# Patient Record
Sex: FEMALE | Race: WHITE | NOT HISPANIC OR LATINO | Employment: FULL TIME | URBAN - METROPOLITAN AREA
[De-identification: names, ages, dates, MRNs, and addresses within clinical notes are randomized per-mention and may not be internally consistent; named-entity substitution may affect disease eponyms.]

---

## 2018-02-13 ENCOUNTER — OFFICE VISIT - HEALTHEAST (OUTPATIENT)
Dept: SURGERY | Facility: CLINIC | Age: 28
End: 2018-02-13

## 2018-02-13 DIAGNOSIS — L05.01 PILONIDAL ABSCESS OF NATAL CLEFT: ICD-10-CM

## 2018-02-13 ASSESSMENT — MIFFLIN-ST. JEOR: SCORE: 1371.68

## 2018-02-16 ENCOUNTER — AMBULATORY - HEALTHEAST (OUTPATIENT)
Dept: SURGERY | Facility: CLINIC | Age: 28
End: 2018-02-16

## 2018-03-01 ENCOUNTER — OFFICE VISIT - HEALTHEAST (OUTPATIENT)
Dept: FAMILY MEDICINE | Facility: CLINIC | Age: 28
End: 2018-03-01

## 2018-03-01 DIAGNOSIS — N76.0 BACTERIAL VAGINOSIS: ICD-10-CM

## 2018-03-01 DIAGNOSIS — B96.89 BACTERIAL VAGINOSIS: ICD-10-CM

## 2018-03-01 DIAGNOSIS — E10.9 TYPE 1 DIABETES MELLITUS WITHOUT COMPLICATION (H): ICD-10-CM

## 2018-03-01 DIAGNOSIS — L05.91 PILONIDAL CYST: ICD-10-CM

## 2018-03-01 DIAGNOSIS — Z00.00 ROUTINE PHYSICAL EXAMINATION: ICD-10-CM

## 2018-03-01 DIAGNOSIS — N89.8 VAGINAL DISCHARGE: ICD-10-CM

## 2018-03-01 LAB
ANION GAP SERPL CALCULATED.3IONS-SCNC: 11 MMOL/L (ref 5–18)
BUN SERPL-MCNC: 13 MG/DL (ref 8–22)
CALCIUM SERPL-MCNC: 9.5 MG/DL (ref 8.5–10.5)
CHLORIDE BLD-SCNC: 99 MMOL/L (ref 98–107)
CHOLEST SERPL-MCNC: 165 MG/DL
CLUE CELLS: ABNORMAL
CO2 SERPL-SCNC: 28 MMOL/L (ref 22–31)
CREAT SERPL-MCNC: 0.79 MG/DL (ref 0.6–1.1)
CREAT UR-MCNC: 104.9 MG/DL
FASTING STATUS PATIENT QL REPORTED: YES
GFR SERPL CREATININE-BSD FRML MDRD: >60 ML/MIN/1.73M2
GLUCOSE BLD-MCNC: 190 MG/DL (ref 70–125)
HBA1C MFR BLD: 8.4 % (ref 3.5–6)
HDLC SERPL-MCNC: 64 MG/DL
LDLC SERPL CALC-MCNC: 89 MG/DL
MICROALBUMIN UR-MCNC: 1.54 MG/DL (ref 0–1.99)
MICROALBUMIN/CREAT UR: 14.7 MG/G
POTASSIUM BLD-SCNC: 3.8 MMOL/L (ref 3.5–5)
SODIUM SERPL-SCNC: 138 MMOL/L (ref 136–145)
TRICHOMONAS, WET PREP: ABNORMAL
TRIGL SERPL-MCNC: 58 MG/DL
YEAST, WET PREP: ABNORMAL

## 2018-03-01 ASSESSMENT — MIFFLIN-ST. JEOR: SCORE: 1385.41

## 2018-03-02 LAB
C TRACH DNA SPEC QL PROBE+SIG AMP: NEGATIVE
N GONORRHOEA DNA SPEC QL NAA+PROBE: NEGATIVE

## 2018-03-05 ENCOUNTER — COMMUNICATION - HEALTHEAST (OUTPATIENT)
Dept: INTERNAL MEDICINE | Facility: CLINIC | Age: 28
End: 2018-03-05

## 2018-03-05 LAB

## 2018-04-10 ENCOUNTER — AMBULATORY - HEALTHEAST (OUTPATIENT)
Dept: ENDOCRINOLOGY | Facility: CLINIC | Age: 28
End: 2018-04-10

## 2018-04-10 ENCOUNTER — OFFICE VISIT - HEALTHEAST (OUTPATIENT)
Dept: ENDOCRINOLOGY | Facility: CLINIC | Age: 28
End: 2018-04-10

## 2018-04-10 DIAGNOSIS — E10.9 TYPE 1 DIABETES MELLITUS WITHOUT COMPLICATION (H): ICD-10-CM

## 2018-04-10 ASSESSMENT — MIFFLIN-ST. JEOR: SCORE: 1415.35

## 2018-04-19 ENCOUNTER — COMMUNICATION - HEALTHEAST (OUTPATIENT)
Dept: ENDOCRINOLOGY | Facility: CLINIC | Age: 28
End: 2018-04-19

## 2018-04-19 DIAGNOSIS — E10.9 TYPE 1 DIABETES MELLITUS WITHOUT COMPLICATION (H): ICD-10-CM

## 2018-05-08 ENCOUNTER — COMMUNICATION - HEALTHEAST (OUTPATIENT)
Dept: ENDOCRINOLOGY | Facility: CLINIC | Age: 28
End: 2018-05-08

## 2018-05-08 DIAGNOSIS — E10.9 TYPE 1 DIABETES MELLITUS WITHOUT COMPLICATION (H): ICD-10-CM

## 2018-07-10 ENCOUNTER — COMMUNICATION - HEALTHEAST (OUTPATIENT)
Dept: TELEHEALTH | Facility: CLINIC | Age: 28
End: 2018-07-10

## 2018-07-10 ENCOUNTER — AMBULATORY - HEALTHEAST (OUTPATIENT)
Dept: LAB | Facility: CLINIC | Age: 28
End: 2018-07-10

## 2018-07-10 DIAGNOSIS — E10.9 TYPE 1 DIABETES MELLITUS WITHOUT COMPLICATION (H): ICD-10-CM

## 2018-07-10 LAB
ANION GAP SERPL CALCULATED.3IONS-SCNC: 8 MMOL/L (ref 5–18)
BUN SERPL-MCNC: 12 MG/DL (ref 8–22)
CALCIUM SERPL-MCNC: 9.6 MG/DL (ref 8.5–10.5)
CHLORIDE BLD-SCNC: 107 MMOL/L (ref 98–107)
CO2 SERPL-SCNC: 24 MMOL/L (ref 22–31)
CREAT SERPL-MCNC: 0.74 MG/DL (ref 0.6–1.1)
GFR SERPL CREATININE-BSD FRML MDRD: >60 ML/MIN/1.73M2
GLUCOSE BLD-MCNC: 152 MG/DL (ref 70–125)
HBA1C MFR BLD: 7.3 % (ref 3.5–6)
POTASSIUM BLD-SCNC: 4.2 MMOL/L (ref 3.5–5)
SODIUM SERPL-SCNC: 139 MMOL/L (ref 136–145)
TSH SERPL DL<=0.005 MIU/L-ACNC: 5.73 UIU/ML (ref 0.3–5)

## 2018-07-17 ENCOUNTER — OFFICE VISIT - HEALTHEAST (OUTPATIENT)
Dept: ENDOCRINOLOGY | Facility: CLINIC | Age: 28
End: 2018-07-17

## 2018-07-17 DIAGNOSIS — E10.9 TYPE 1 DIABETES MELLITUS WITHOUT COMPLICATION (H): ICD-10-CM

## 2018-07-17 RX ORDER — GLUCOSAMINE HCL/CHONDROITIN SU 500-400 MG
CAPSULE ORAL
Qty: 200 STRIP | Refills: 5 | Status: SHIPPED | OUTPATIENT
Start: 2018-07-17

## 2018-07-17 ASSESSMENT — MIFFLIN-ST. JEOR: SCORE: 1435.31

## 2018-08-09 ENCOUNTER — OFFICE VISIT - HEALTHEAST (OUTPATIENT)
Dept: EDUCATION SERVICES | Facility: CLINIC | Age: 28
End: 2018-08-09

## 2018-08-09 DIAGNOSIS — E10.9 TYPE 1 DIABETES MELLITUS WITHOUT COMPLICATION (H): ICD-10-CM

## 2018-08-28 ENCOUNTER — OFFICE VISIT - HEALTHEAST (OUTPATIENT)
Dept: SURGERY | Facility: CLINIC | Age: 28
End: 2018-08-28

## 2018-08-28 DIAGNOSIS — L05.01 PILONIDAL ABSCESS OF NATAL CLEFT: ICD-10-CM

## 2018-08-28 ASSESSMENT — MIFFLIN-ST. JEOR: SCORE: 1428.96

## 2018-08-29 ENCOUNTER — AMBULATORY - HEALTHEAST (OUTPATIENT)
Dept: ENDOCRINOLOGY | Facility: CLINIC | Age: 28
End: 2018-08-29

## 2018-08-29 DIAGNOSIS — E10.9 TYPE 1 DIABETES MELLITUS WITHOUT COMPLICATION (H): ICD-10-CM

## 2018-09-13 ENCOUNTER — OFFICE VISIT - HEALTHEAST (OUTPATIENT)
Dept: SURGERY | Facility: CLINIC | Age: 28
End: 2018-09-13

## 2018-09-13 DIAGNOSIS — L05.91 PILONIDAL CYST: ICD-10-CM

## 2018-09-13 ASSESSMENT — MIFFLIN-ST. JEOR: SCORE: 1410.81

## 2018-10-02 ENCOUNTER — COMMUNICATION - HEALTHEAST (OUTPATIENT)
Dept: ADMINISTRATIVE | Facility: CLINIC | Age: 28
End: 2018-10-02

## 2018-10-02 DIAGNOSIS — E10.9 TYPE 1 DIABETES MELLITUS WITHOUT COMPLICATION (H): ICD-10-CM

## 2018-10-03 ENCOUNTER — COMMUNICATION - HEALTHEAST (OUTPATIENT)
Dept: ENDOCRINOLOGY | Facility: CLINIC | Age: 28
End: 2018-10-03

## 2018-10-03 DIAGNOSIS — E10.9 TYPE 1 DIABETES MELLITUS WITHOUT COMPLICATION (H): ICD-10-CM

## 2018-10-11 ENCOUNTER — OFFICE VISIT - HEALTHEAST (OUTPATIENT)
Dept: SURGERY | Facility: CLINIC | Age: 28
End: 2018-10-11

## 2018-10-11 DIAGNOSIS — L05.91 PILONIDAL CYST: ICD-10-CM

## 2018-11-01 ENCOUNTER — OFFICE VISIT - HEALTHEAST (OUTPATIENT)
Dept: SURGERY | Facility: CLINIC | Age: 28
End: 2018-11-01

## 2018-11-01 DIAGNOSIS — L05.91 PILONIDAL CYST: ICD-10-CM

## 2018-11-01 ASSESSMENT — MIFFLIN-ST. JEOR: SCORE: 1410.81

## 2018-11-06 ENCOUNTER — COMMUNICATION - HEALTHEAST (OUTPATIENT)
Dept: FAMILY MEDICINE | Facility: CLINIC | Age: 28
End: 2018-11-06

## 2018-11-13 ENCOUNTER — OFFICE VISIT - HEALTHEAST (OUTPATIENT)
Dept: FAMILY MEDICINE | Facility: CLINIC | Age: 28
End: 2018-11-13

## 2018-11-13 DIAGNOSIS — L05.91 PILONIDAL CYST: ICD-10-CM

## 2018-11-13 DIAGNOSIS — E10.9 TYPE 1 DIABETES MELLITUS WITHOUT COMPLICATION (H): ICD-10-CM

## 2018-11-13 DIAGNOSIS — Z01.818 PRE-OP EXAM: ICD-10-CM

## 2018-11-13 LAB
ANION GAP SERPL CALCULATED.3IONS-SCNC: 9 MMOL/L (ref 5–18)
BUN SERPL-MCNC: 14 MG/DL (ref 8–22)
CALCIUM SERPL-MCNC: 9.7 MG/DL (ref 8.5–10.5)
CHLORIDE BLD-SCNC: 104 MMOL/L (ref 98–107)
CO2 SERPL-SCNC: 26 MMOL/L (ref 22–31)
CREAT SERPL-MCNC: 0.76 MG/DL (ref 0.6–1.1)
ERYTHROCYTE [DISTWIDTH] IN BLOOD BY AUTOMATED COUNT: 11.2 % (ref 11–14.5)
GFR SERPL CREATININE-BSD FRML MDRD: >60 ML/MIN/1.73M2
GLUCOSE BLD-MCNC: 105 MG/DL (ref 70–125)
HBA1C MFR BLD: 7.8 % (ref 3.5–6)
HCT VFR BLD AUTO: 41.8 % (ref 35–47)
HGB BLD-MCNC: 14.3 G/DL (ref 12–16)
MCH RBC QN AUTO: 29.1 PG (ref 27–34)
MCHC RBC AUTO-ENTMCNC: 34.1 G/DL (ref 32–36)
MCV RBC AUTO: 85 FL (ref 80–100)
PLATELET # BLD AUTO: 270 THOU/UL (ref 140–440)
PMV BLD AUTO: 8.6 FL (ref 7–10)
POTASSIUM BLD-SCNC: 4.1 MMOL/L (ref 3.5–5)
RBC # BLD AUTO: 4.9 MILL/UL (ref 3.8–5.4)
SODIUM SERPL-SCNC: 139 MMOL/L (ref 136–145)
WBC: 4.6 THOU/UL (ref 4–11)

## 2018-11-13 ASSESSMENT — MIFFLIN-ST. JEOR: SCORE: 1421.02

## 2018-11-16 ASSESSMENT — MIFFLIN-ST. JEOR: SCORE: 1419.88

## 2018-11-19 ENCOUNTER — ANESTHESIA - HEALTHEAST (OUTPATIENT)
Dept: SURGERY | Facility: AMBULATORY SURGERY CENTER | Age: 28
End: 2018-11-19

## 2018-11-20 ENCOUNTER — SURGERY - HEALTHEAST (OUTPATIENT)
Dept: SURGERY | Facility: AMBULATORY SURGERY CENTER | Age: 28
End: 2018-11-20

## 2018-11-20 ASSESSMENT — MIFFLIN-ST. JEOR: SCORE: 1419.88

## 2018-11-29 ENCOUNTER — OFFICE VISIT - HEALTHEAST (OUTPATIENT)
Dept: SURGERY | Facility: CLINIC | Age: 28
End: 2018-11-29

## 2018-11-29 DIAGNOSIS — Z48.89 POSTOPERATIVE VISIT: ICD-10-CM

## 2018-11-29 ASSESSMENT — MIFFLIN-ST. JEOR: SCORE: 1419.88

## 2018-12-18 ENCOUNTER — OFFICE VISIT - HEALTHEAST (OUTPATIENT)
Dept: SURGERY | Facility: CLINIC | Age: 28
End: 2018-12-18

## 2018-12-18 DIAGNOSIS — Z48.89 POSTOPERATIVE VISIT: ICD-10-CM

## 2018-12-18 ASSESSMENT — MIFFLIN-ST. JEOR: SCORE: 1419.88

## 2019-02-06 ENCOUNTER — COMMUNICATION - HEALTHEAST (OUTPATIENT)
Dept: FAMILY MEDICINE | Facility: CLINIC | Age: 29
End: 2019-02-06

## 2019-02-06 DIAGNOSIS — N89.8 VAGINAL DISCHARGE: ICD-10-CM

## 2019-02-08 ENCOUNTER — COMMUNICATION - HEALTHEAST (OUTPATIENT)
Dept: TELEHEALTH | Facility: CLINIC | Age: 29
End: 2019-02-08

## 2019-02-08 DIAGNOSIS — E10.9 TYPE 1 DIABETES MELLITUS WITHOUT COMPLICATION (H): ICD-10-CM

## 2019-02-20 ENCOUNTER — COMMUNICATION - HEALTHEAST (OUTPATIENT)
Dept: ENDOCRINOLOGY | Facility: CLINIC | Age: 29
End: 2019-02-20

## 2019-02-20 DIAGNOSIS — E10.9 TYPE 1 DIABETES MELLITUS WITHOUT COMPLICATION (H): ICD-10-CM

## 2019-02-27 ENCOUNTER — OFFICE VISIT - HEALTHEAST (OUTPATIENT)
Dept: FAMILY MEDICINE | Facility: CLINIC | Age: 29
End: 2019-02-27

## 2019-02-27 DIAGNOSIS — Z30.9 ENCOUNTER FOR CONTRACEPTIVE MANAGEMENT, UNSPECIFIED TYPE: ICD-10-CM

## 2019-02-27 RX ORDER — NORETHINDRONE ACETATE AND ETHINYL ESTRADIOL .02; 1 MG/1; MG/1
1 TABLET ORAL DAILY
Qty: 3 PACKAGE | Refills: 4 | Status: SHIPPED | OUTPATIENT
Start: 2019-02-27 | End: 2023-10-07

## 2019-02-27 ASSESSMENT — MIFFLIN-ST. JEOR: SCORE: 1416.25

## 2019-03-09 ENCOUNTER — COMMUNICATION - HEALTHEAST (OUTPATIENT)
Dept: ENDOCRINOLOGY | Facility: CLINIC | Age: 29
End: 2019-03-09

## 2019-03-14 ENCOUNTER — OFFICE VISIT - HEALTHEAST (OUTPATIENT)
Dept: FAMILY MEDICINE | Facility: CLINIC | Age: 29
End: 2019-03-14

## 2019-03-14 ENCOUNTER — COMMUNICATION - HEALTHEAST (OUTPATIENT)
Dept: NURSING | Facility: CLINIC | Age: 29
End: 2019-03-14

## 2019-03-14 DIAGNOSIS — E10.9 TYPE 1 DIABETES MELLITUS WITHOUT COMPLICATION (H): ICD-10-CM

## 2019-03-14 DIAGNOSIS — Z00.00 ROUTINE HISTORY AND PHYSICAL EXAMINATION OF ADULT: ICD-10-CM

## 2019-03-14 LAB
ANION GAP SERPL CALCULATED.3IONS-SCNC: 8 MMOL/L (ref 5–18)
BUN SERPL-MCNC: 16 MG/DL (ref 8–22)
CALCIUM SERPL-MCNC: 9.5 MG/DL (ref 8.5–10.5)
CHLORIDE BLD-SCNC: 101 MMOL/L (ref 98–107)
CHOLEST SERPL-MCNC: 184 MG/DL
CO2 SERPL-SCNC: 28 MMOL/L (ref 22–31)
CREAT SERPL-MCNC: 0.97 MG/DL (ref 0.6–1.1)
CREAT UR-MCNC: 194.2 MG/DL
FASTING STATUS PATIENT QL REPORTED: NO
GFR SERPL CREATININE-BSD FRML MDRD: >60 ML/MIN/1.73M2
GLUCOSE BLD-MCNC: 279 MG/DL (ref 70–125)
HBA1C MFR BLD: 7.6 % (ref 3.5–6)
HDLC SERPL-MCNC: 68 MG/DL
LDLC SERPL CALC-MCNC: 95 MG/DL
MICROALBUMIN UR-MCNC: 2.23 MG/DL (ref 0–1.99)
MICROALBUMIN/CREAT UR: 11.5 MG/G
POTASSIUM BLD-SCNC: 3.8 MMOL/L (ref 3.5–5)
SODIUM SERPL-SCNC: 137 MMOL/L (ref 136–145)
TRIGL SERPL-MCNC: 103 MG/DL

## 2019-03-14 ASSESSMENT — MIFFLIN-ST. JEOR: SCORE: 1409.9

## 2019-03-15 ENCOUNTER — COMMUNICATION - HEALTHEAST (OUTPATIENT)
Dept: FAMILY MEDICINE | Facility: CLINIC | Age: 29
End: 2019-03-15

## 2019-05-04 ENCOUNTER — COMMUNICATION - HEALTHEAST (OUTPATIENT)
Dept: FAMILY MEDICINE | Facility: CLINIC | Age: 29
End: 2019-05-04

## 2019-05-04 DIAGNOSIS — E10.9 TYPE 1 DIABETES MELLITUS WITHOUT COMPLICATION (H): ICD-10-CM

## 2019-05-05 ENCOUNTER — COMMUNICATION - HEALTHEAST (OUTPATIENT)
Dept: FAMILY MEDICINE | Facility: CLINIC | Age: 29
End: 2019-05-05

## 2019-05-05 DIAGNOSIS — E10.9 TYPE 1 DIABETES MELLITUS WITHOUT COMPLICATION (H): ICD-10-CM

## 2019-08-21 ENCOUNTER — COMMUNICATION - HEALTHEAST (OUTPATIENT)
Dept: FAMILY MEDICINE | Facility: CLINIC | Age: 29
End: 2019-08-21

## 2019-08-21 DIAGNOSIS — E10.9 TYPE 1 DIABETES MELLITUS WITHOUT COMPLICATION (H): ICD-10-CM

## 2019-09-19 ENCOUNTER — COMMUNICATION - HEALTHEAST (OUTPATIENT)
Dept: FAMILY MEDICINE | Facility: CLINIC | Age: 29
End: 2019-09-19

## 2019-10-21 ENCOUNTER — COMMUNICATION - HEALTHEAST (OUTPATIENT)
Dept: FAMILY MEDICINE | Facility: CLINIC | Age: 29
End: 2019-10-21

## 2020-05-15 ENCOUNTER — COMMUNICATION - HEALTHEAST (OUTPATIENT)
Dept: FAMILY MEDICINE | Facility: CLINIC | Age: 30
End: 2020-05-15

## 2020-05-15 DIAGNOSIS — Z30.9 ENCOUNTER FOR CONTRACEPTIVE MANAGEMENT, UNSPECIFIED TYPE: ICD-10-CM

## 2020-05-28 ENCOUNTER — COMMUNICATION - HEALTHEAST (OUTPATIENT)
Dept: FAMILY MEDICINE | Facility: CLINIC | Age: 30
End: 2020-05-28

## 2020-05-28 DIAGNOSIS — E10.9 TYPE 1 DIABETES MELLITUS WITHOUT COMPLICATION (H): ICD-10-CM

## 2020-06-01 ENCOUNTER — COMMUNICATION - HEALTHEAST (OUTPATIENT)
Dept: FAMILY MEDICINE | Facility: CLINIC | Age: 30
End: 2020-06-01

## 2020-06-01 DIAGNOSIS — E10.9 TYPE 1 DIABETES MELLITUS WITHOUT COMPLICATION (H): ICD-10-CM

## 2021-05-28 NOTE — TELEPHONE ENCOUNTER
Refill Approved    Rx renewed per Medication Renewal Policy. Medication was last renewed on 3/14/19.    Ov: 3/14/19    Maty Duran, [facility] Triage/Med Refill 5/5/2019     Requested Prescriptions   Pending Prescriptions Disp Refills     insulin lispro (ADMELOG SOLOSTAR U-100 INSULIN) 100 unit/mL pen 60 mL 0     Sig: Use up to 65 units daily in insulin pump; please dispense vials, not pens       Insulin/GLP-1 Refill Protocol Passed - 5/4/2019 12:45 PM        Passed - Visit with PCP or prescribing provider visit in last 6 months     Last office visit with prescriber/PCP: Visit date not found OR same dept: Visit date not found OR same specialty: Visit date not found Last physical: 3/14/2019 Last MTM visit: Visit date not found     Next appt within 3 mo: Visit date not found  Next physical within 3 mo: Visit date not found  Prescriber OR PCP: Pawel Blanchard MD  Last diagnosis associated with med order: 1. Type 1 diabetes mellitus without complication (H)  - insulin lispro (ADMELOG SOLOSTAR U-100 INSULIN) 100 unit/mL pen; Use up to 65 units daily in insulin pump; please dispense vials, not pens  Dispense: 60 mL; Refill: 0    If protocol passes may refill for 6 months if within 3 months of last provider visit (or a total of 9 months).              Passed - A1C in last 6 months     Hemoglobin A1c   Date Value Ref Range Status   03/14/2019 7.6 (H) 3.5 - 6.0 % Final               Passed - Microalbumin in last year     Microalbumin, Random Urine   Date Value Ref Range Status   03/14/2019 2.23 (H) 0.00 - 1.99 mg/dL Final                  Passed - Blood pressure in last year     BP Readings from Last 1 Encounters:   03/14/19 116/78             Passed - Creatinine done in last year     Creatinine   Date Value Ref Range Status   03/14/2019 0.97 0.60 - 1.10 mg/dL Final

## 2021-05-28 NOTE — TELEPHONE ENCOUNTER
Refill Approved    Rx renewed per Medication Renewal Policy. Medication was last renewed on 5/5/19.    Keiko Johnson, [facility] Connection Triage/Med Refill 5/5/2019     Requested Prescriptions   Pending Prescriptions Disp Refills     insulin lispro (ADMELOG SOLOSTAR U-100 INSULIN) 100 unit/mL pen 60 mL 0     Sig: Use up to 65 units daily in insulin pump; please dispense vials, not pens       Insulin/GLP-1 Refill Protocol Passed - 5/5/2019 11:39 AM        Passed - Visit with PCP or prescribing provider visit in last 6 months     Last office visit with prescriber/PCP: Visit date not found OR same dept: Visit date not found OR same specialty: Visit date not found Last physical: 3/14/2019 Last MTM visit: Visit date not found     Next appt within 3 mo: Visit date not found  Next physical within 3 mo: Visit date not found  Prescriber OR PCP: Pawel Blanchard MD  Last diagnosis associated with med order: 1. Type 1 diabetes mellitus without complication (H)  - insulin lispro (ADMELOG SOLOSTAR U-100 INSULIN) 100 unit/mL pen; Use up to 65 units daily in insulin pump; please dispense vials, not pens  Dispense: 60 mL; Refill: 0    If protocol passes may refill for 6 months if within 3 months of last provider visit (or a total of 9 months).              Passed - A1C in last 6 months     Hemoglobin A1c   Date Value Ref Range Status   03/14/2019 7.6 (H) 3.5 - 6.0 % Final               Passed - Microalbumin in last year     Microalbumin, Random Urine   Date Value Ref Range Status   03/14/2019 2.23 (H) 0.00 - 1.99 mg/dL Final                  Passed - Blood pressure in last year     BP Readings from Last 1 Encounters:   03/14/19 116/78             Passed - Creatinine done in last year     Creatinine   Date Value Ref Range Status   03/14/2019 0.97 0.60 - 1.10 mg/dL Final

## 2021-05-31 NOTE — TELEPHONE ENCOUNTER
Refill Approved    Rx renewed per Medication Renewal Policy. Medication was last renewed on 5/5/19 #60mL R-0.    Last OV 3/14/19    Ivana Kowalski, [facility] Connection Triage/Med Refill 8/22/2019     Requested Prescriptions   Pending Prescriptions Disp Refills     insulin lispro (ADMELOG SOLOSTAR U-100 INSULIN) 100 unit/mL pen 60 mL 0     Sig: Use up to 65 units daily in insulin pump; please dispense vials, not pens       Insulin/GLP-1 Refill Protocol Passed - 8/21/2019 10:08 AM        Passed - Visit with PCP or prescribing provider visit in last 6 months     Last office visit with prescriber/PCP: Visit date not found OR same dept: Visit date not found OR same specialty: Visit date not found Last physical: 3/14/2019 Last MTM visit: Visit date not found     Next appt within 3 mo: Visit date not found  Next physical within 3 mo: Visit date not found  Prescriber OR PCP: Pawel Blanchard MD  Last diagnosis associated with med order: 1. Type 1 diabetes mellitus without complication (H)  - insulin lispro (ADMELOG SOLOSTAR U-100 INSULIN) 100 unit/mL pen; Use up to 65 units daily in insulin pump; please dispense vials, not pens  Dispense: 60 mL; Refill: 0    If protocol passes may refill for 6 months if within 3 months of last provider visit (or a total of 9 months).              Passed - A1C in last 6 months     Hemoglobin A1c   Date Value Ref Range Status   03/14/2019 7.6 (H) 3.5 - 6.0 % Final               Passed - Microalbumin in last year     Microalbumin, Random Urine   Date Value Ref Range Status   03/14/2019 2.23 (H) 0.00 - 1.99 mg/dL Final                  Passed - Blood pressure in last year     BP Readings from Last 1 Encounters:   03/14/19 116/78             Passed - Creatinine done in last year     Creatinine   Date Value Ref Range Status   03/14/2019 0.97 0.60 - 1.10 mg/dL Final

## 2021-06-01 VITALS — BODY MASS INDEX: 26.06 KG/M2 | HEIGHT: 65 IN | WEIGHT: 156.4 LBS

## 2021-06-01 VITALS — BODY MASS INDEX: 25.33 KG/M2 | HEIGHT: 65 IN | WEIGHT: 152 LBS

## 2021-06-01 VITALS — BODY MASS INDEX: 24.22 KG/M2 | WEIGHT: 145.4 LBS | HEIGHT: 65 IN

## 2021-06-01 VITALS — HEIGHT: 65 IN | WEIGHT: 141.5 LBS | BODY MASS INDEX: 23.57 KG/M2

## 2021-06-02 VITALS — HEIGHT: 65 IN | BODY MASS INDEX: 25.53 KG/M2 | WEIGHT: 153.25 LBS

## 2021-06-02 VITALS — WEIGHT: 153 LBS | BODY MASS INDEX: 25.49 KG/M2 | HEIGHT: 65 IN

## 2021-06-02 VITALS — BODY MASS INDEX: 25.49 KG/M2 | HEIGHT: 65 IN | WEIGHT: 153 LBS

## 2021-06-02 VITALS — HEIGHT: 65 IN | WEIGHT: 153 LBS | BODY MASS INDEX: 25.49 KG/M2

## 2021-06-02 VITALS — BODY MASS INDEX: 25.16 KG/M2 | HEIGHT: 65 IN | WEIGHT: 151 LBS

## 2021-06-02 VITALS — WEIGHT: 151 LBS | BODY MASS INDEX: 25.16 KG/M2 | HEIGHT: 65 IN

## 2021-06-02 VITALS — HEIGHT: 65 IN | BODY MASS INDEX: 25.83 KG/M2 | WEIGHT: 155 LBS

## 2021-06-02 VITALS — WEIGHT: 150.8 LBS | BODY MASS INDEX: 25.12 KG/M2 | HEIGHT: 65 IN

## 2021-06-02 VITALS — BODY MASS INDEX: 25.36 KG/M2 | HEIGHT: 65 IN | WEIGHT: 152.2 LBS

## 2021-06-02 NOTE — TELEPHONE ENCOUNTER
Central PA team  [phone]  Pool: HE PA MED ([id number])          PA has been initiated.       PA form completed and faxed insurance via Cover My Meds     Key:  [id number]     Medication:  Admelog 100UNIT/ML solution    [place]urance:  [employer]        Response will be received via fax and may take up to 5-10 business days depending on plan

## 2021-06-02 NOTE — TELEPHONE ENCOUNTER
Prior Authorization Request  Who s requesting:  Pharmacy  Pharmacy Name and Location: [facility] #[id number]  Medication Name: admelog  Insurance Plan: [employer] [place]  Insurance Member ID Number:  [id number]  Informed patient that prior authorizations can take up to 10 business days for response:   No  Okay to leave a detailed message: No

## 2021-06-08 NOTE — TELEPHONE ENCOUNTER
Refill Request: Microgestin  Last filled: 2.27.2019 disp 3 refills 4  Last visit: 2.27.2019  Encounter for contraceptive management, unspecified type  Doing well.   refill  - norethindrone-ethinyl estradiol (MICROGESTIN 1/20) 1-20 mg-mcg per tablet; Take 1 tablet by mouth daily.  Dispense: 3 Package; Refill: 4      No upcoming appts.

## 2021-06-08 NOTE — TELEPHONE ENCOUNTER
Left message for pt to call back. Pt is due for a face to face in clinic appointment. Please assist with scheduling.

## 2021-06-08 NOTE — TELEPHONE ENCOUNTER
RN cannot approve Refill Request    RN can NOT refill this medication Protocol failed and NO refill given.      Jane Hampton, [facility] Triage/Med Refill 6/1/2020    Requested Prescriptions   Pending Prescriptions Disp Refills     insulin lispro (ADMELOG SOLOSTAR U-100 INSULIN) 100 unit/mL pen 60 mL 0     Sig: Use up to 65 units daily in insulin pump; please dispense vials, not pens       Insulin/GLP-1 Refill Protocol Failed - 5/28/2020 11:25 PM        Failed - Visit with PCP or prescribing provider visit in last 6 months     Last office visit with prescriber/PCP: Visit date not found OR same dept: Visit date not found OR same specialty: Visit date not found Last physical: Visit date not found Last MTM visit: Visit date not found     Next appt within 3 mo: Visit date not found  Next physical within 3 mo: Visit date not found  Prescriber OR PCP: Pawel Blanchard MD  Last diagnosis associated with med order: 1. Type 1 diabetes mellitus without complication (H)  - insulin lispro (ADMELOG SOLOSTAR U-100 INSULIN) 100 unit/mL pen; Use up to 65 units daily in insulin pump; please dispense vials, not pens  Dispense: 60 mL; Refill: 0    If protocol passes may refill for 6 months if within 3 months of last provider visit (or a total of 9 months).              Failed - A1C in last 6 months     Hemoglobin A1c   Date Value Ref Range Status   03/14/2019 7.6 (H) 3.5 - 6.0 % Final               Failed - Microalbumin in last year     Microalbumin, Random Urine   Date Value Ref Range Status   03/14/2019 2.23 (H) 0.00 - 1.99 mg/dL Final                  Failed - Blood pressure in last year     BP Readings from Last 1 Encounters:   03/14/19 116/78             Failed - Creatinine done in last year     Creatinine   Date Value Ref Range Status   03/14/2019 0.97 0.60 - 1.10 mg/dL Final

## 2021-06-08 NOTE — TELEPHONE ENCOUNTER
RN cannot approve Refill Request    RN can NOT refill this medication Protocol failed and NO refill given.     Jane Hampton, [facility] Triage/Med Refill 6/3/2020    Requested Prescriptions   Pending Prescriptions Disp Refills     insulin lispro (ADMELOG SOLOSTAR U-100 INSULIN) 100 unit/mL pen 60 mL 0     Sig: Use up to 65 units daily in insulin pump; please dispense vials, not pens       Insulin/GLP-1 Refill Protocol Failed - 6/2/2020  9:31 AM        Failed - Visit with PCP or prescribing provider visit in last 6 months     Last office visit with prescriber/PCP: Visit date not found OR same dept: Visit date not found OR same specialty: Visit date not found Last physical: Visit date not found Last MTM visit: Visit date not found     Next appt within 3 mo: Visit date not found  Next physical within 3 mo: Visit date not found  Prescriber OR PCP: Pawel Blanchard MD  Last diagnosis associated with med order: 1. Type 1 diabetes mellitus without complication (H)  - insulin lispro (ADMELOG SOLOSTAR U-100 INSULIN) 100 unit/mL pen; Use up to 65 units daily in insulin pump; please dispense vials, not pens  Dispense: 60 mL; Refill: 0    If protocol passes may refill for 6 months if within 3 months of last provider visit (or a total of 9 months).              Failed - A1C in last 6 months     Hemoglobin A1c   Date Value Ref Range Status   03/14/2019 7.6 (H) 3.5 - 6.0 % Final               Failed - Microalbumin in last year     Microalbumin, Random Urine   Date Value Ref Range Status   03/14/2019 2.23 (H) 0.00 - 1.99 mg/dL Final                  Failed - Blood pressure in last year     BP Readings from Last 1 Encounters:   03/14/19 116/78             Failed - Creatinine done in last year     Creatinine   Date Value Ref Range Status   03/14/2019 0.97 0.60 - 1.10 mg/dL Final

## 2021-06-16 PROBLEM — E10.9 TYPE 1 DIABETES MELLITUS WITHOUT COMPLICATION (H): Status: ACTIVE | Noted: 2018-03-01

## 2021-06-16 PROBLEM — Z01.818 PRE-OP EXAM: Status: ACTIVE | Noted: 2018-11-19

## 2021-06-16 NOTE — TELEPHONE ENCOUNTER
Telephone Encounter by Sarika Quinn at 10/22/2019  3:18 PM     Author: Sarika Quinn Service: -- Author Type: --    Filed: 10/22/2019  3:19 PM Encounter Date: 10/21/2019 Status: Signed    : Sarika Quinn APPROVED:    Approval start date:07/22/2019  Approval end date:10/22/2020    Pharmacy has been notified of approval and will contact patient when medication is ready for pickup.

## 2021-06-16 NOTE — PROGRESS NOTES
Incision and drainage of pilonidal abscess  Date/Time: 2/13/2018 3:40 PM  Performed by: NIKITA LOREDO  Authorized by: NIKITA LOREDO   Type: abscess  Body area: anogenital  Location details: pilonidal  Anesthesia: local infiltration    Anesthesia:  Local Anesthetic: lidocaine 1% with epinephrine    Sedation:  Patient sedated: no  Incision type: single straight  Incision depth: subcutaneous  Complexity: simple  Drainage: purulent  Drainage amount: moderate  Packing material: 1/2 in gauze  Patient tolerance: Patient tolerated the procedure well with no immediate complications

## 2021-06-16 NOTE — PROGRESS NOTES
"HPI: I am consulted by the ER in this 27 y.o. female regarding a cyst. This is on the  upper russ cleft. This has been present for 2 days. It has been growing. It is associated with pain.   She first developed pilonidal cysts/abscesses in 2016 and it required drainage then.  It required a repeat drainage about a month later, and then she had another episode later in 2016.  It was quiescent until this current flareup.        No Known Allergies      Current Outpatient Prescriptions:      NOVOLOG 100 unit/mL injection, , Disp: , Rfl: 2     UNABLE TO FIND, Med Name:Generic Birth Control, not sure of name, take 1 tablet once a day, Disp: , Rfl:     Past Medical History:   Diagnosis Date     Diabetes mellitus     Type 1, diagnosed 10/2007     Pilonidal cyst        History reviewed. No pertinent surgical history.    Social History     Social History     Marital status: Single     Spouse name: N/A     Number of children: N/A     Years of education: N/A     Occupational History     Not on file.     Social History Main Topics     Smoking status: Never Smoker     Smokeless tobacco: Never Used     Alcohol use Yes      Comment: occasionally     Drug use: No     Sexual activity: Not on file     Other Topics Concern     Not on file     Social History Narrative     No narrative on file       /72 (Patient Site: Right Arm, Patient Position: Sitting, Cuff Size: Adult Regular)  Pulse (!) 103  Ht 5' 5.25\" (1.657 m)  Wt 141 lb 8 oz (64.2 kg)  LMP  (Within Weeks)  SpO2 100%  Breastfeeding? No  BMI 23.37 kg/m2  Body mass index is 23.37 kg/(m^2).    EXAM:  GENERAL: Well developed female in no distress.  BUTTOCKS: Tender swelling at upper end of russ cleft.  Pilonidal pits within the russ cleft.  I incised and drained this in the clinic, releasing a large amount of foul-smelling pus.  Packed it with ribbon gauze.    Assessment/Plan: Pt with a pilonidal abscess.  She will return in a couple of days for changing of the " packing, and we will packed this until it heals from the base to the surface.  At that point, we may have to consider definitive operation.  Adriel Banuelos MD  [facility] Department of Surgery

## 2021-06-16 NOTE — PROGRESS NOTES
Pt here for re-packing of pilonidal cyst; superior gluteal cleft, I&D performed on 2/13/18. Pt states that the original packing fell out last night around 8 pm. Incision is healing well. Slight redness around wound edges. 6 cm of 1/2 inch nu-gauze was repacked. Small amount amount of cream colored drainage. Covered with gauze and medipore tape. Pt's boyfriend was instructed on how to re-pack wound and supplies were given. This will be performed daily. Pt will follow up in about 1 month with Dr. Banuelos re re-evaluation and to discuss surgery. She was instructed to call if she has any questions or concerns. Also offered her to come into clinic for wound packing if needed. Pt had no further questions.

## 2021-06-16 NOTE — PROGRESS NOTES
Assessment/Plan:        1. Routine physical examination  - Basic Metabolic Panel  - Lipid Profile  - Gynecologic Cytology (PAP Smear)  - Chlamydia trachomatis & Neisseria gonorrhoeae, Amplified Detection    2. Type 1 diabetes mellitus without complication  - Glycosylated Hemoglobin A1c  - Microalbumin, Random Urine  - Ambulatory referral to Endocrinology    3. Pilonidal cyst    4. Vaginal discharge  Wet prep positive with BV- treating with flagyl.       5. Contraception  - norethindrone-ethinyl estradiol (MICROGESTIN 1/20) 1-20 mg-mcg per tablet; Take 1 tablet by mouth daily.  Dispense: 3 Package; Refill: 3          ______________________________________________________________________    Lauren BEN Chauhan is a 27 y.o. female coming in today for a routine physical.    Additional issues outside the physical to discuss:   Needs her BCP refilled.      Past Medical History:   Diagnosis Date     Diabetes mellitus     Type 1, diagnosed 10/2007     Pilonidal cyst      Type 1 diabetes mellitus without complication 3/1/2018       Past Surgical History:   Procedure Laterality Date     NO PAST SURGERIES          Family History   Problem Relation Age of Onset     Thyroid disease Mother      Crohn's disease Sister      No Medical Problems Brother      Stroke Maternal Grandfather        Social History     Social History     Marital status: Single     Spouse name: N/A     Number of children: N/A     Years of education: N/A     Social History Main Topics     Smoking status: Never Smoker     Smokeless tobacco: Never Used     Alcohol use Yes      Comment: occasionally     Drug use: No     Sexual activity: Not Asked     Other Topics Concern     None     Social History Narrative          Current Outpatient Prescriptions:      NOVOLOG 100 unit/mL injection, , Disp: , Rfl: 2     UNABLE TO FIND, Med Name:Generic Birth Control, not sure of name, take 1 tablet once a day, Disp: , Rfl:     There is no immunization history on file for this  "patient.   ______________________________________________________________________    ROS:  Review of Systems -   General ROS: negative  Ophthalmic ROS: negative  ENT ROS: negative  Allergy and Immunology ROS: negative  Hematological and Lymphatic ROS: negative  Endocrine ROS: negative  Breast ROS: negative for breast lumps or nipple discharge  Respiratory ROS: no cough, shortness of breath, or wheezing  Cardiovascular ROS: no chest pain or dyspnea on exertion  Gastrointestinal ROS: no abdominal pain, change in bowel habits, or black or bloody stools  Genito-Urinary ROS: no dysuria, trouble voiding, or hematuria, no significant Vaginal discharge  Musculoskeletal ROS: negative  Neurological ROS: negative  Dermatological ROS: negative       ______________________________________________________________________     Exam:    Wt Readings from Last 3 Encounters:   03/01/18 145 lb 6.4 oz (66 kg)   02/13/18 141 lb 8 oz (64.2 kg)     BP Readings from Last 3 Encounters:   03/01/18 118/78   02/13/18 110/72   02/11/18 136/84     /78 (Patient Site: Right Arm, Patient Position: Sitting, Cuff Size: Adult Regular)  Pulse 87  Temp 98.2  F (36.8  C) (Oral)   Ht 5' 5\" (1.651 m)  Wt 145 lb 6.4 oz (66 kg)  LMP 02/12/2018 (Approximate)  SpO2 98%  BMI 24.2 kg/m2       General appearance - alert, well appearing, and in no distress  Eyes - pupils equal and reactive, extraocular eye movements intact  Ears - bilateral TM's and external ear canals normal  Nose - normal and patent, no erythema, discharge or polyps  Mouth - mucous membranes moist, pharynx normal without lesions  Neck - supple, no significant adenopathy  Lymphatics - no palpable lymphadenopathy, no hepatosplenomegaly  Chest - clear to auscultation, no wheezes, rales or rhonchi, symmetric air entry  Heart - normal rate, regular rhythm, normal S1, S2, no murmurs, rubs, clicks or gallops  Abdomen - soft, nontender, nondistended, no masses or organomegaly  Breasts - " breasts appear normal, no suspicious masses, no skin or nipple changes or axillary nodes  Pelvic - VULVA: normal appearing vulva with no masses, tenderness or lesions, VAGINA: vaginal discharge - creamy, CERVIX: normal appearing cervix without discharge or lesions, UTERUS: uterus is normal size, shape, consistency and nontender, ADNEXA: normal adnexa in size, nontender and no masses, RECTAL: external hemorrhoid,   Back exam - pilonidal cyst, full range of motion,no palpable spasms   Neurological - alert, oriented, normal speech, no focal findings or movement disorder noted  Musculoskeletal - no joint tenderness, deformity or swelling  Extremities - peripheral pulses normal, no pedal edema, no clubbing or cyanosis  Skin - normal coloration and turgor, no rashes, no suspicious skin lesions noted

## 2021-06-17 NOTE — PROGRESS NOTES
[facility]  ENDOCRINOLOGY    Diabetes Note 4/11/2018    Lauren Chauhan, 1990, [id number]          Reason for visit      1. Type 1 diabetes mellitus without complication        HPI     Lauren Chauhan is a very pleasant 27 y.o. old female who presents for follow up.  SUMMARY:  Lauren is here today to establish care for DM 1.  She has a current A1c of 8.4, which she states is high for her.  She has been a diabetic for 10 years.  She has been using an insulin pump for 4-5 years.  She has had some difficulties with her insurance and since she has gotten that under control, she decided that she wanted to start afresh, and with Endocrinology, and that is how she landed here.  She reports that she is a student, and is working 3 jobs.  One of the jobs is working in a [employer], which is very hectic and she doesn't have a lot of control of her own time.  One of the other jobs is sedentary and she has a lot more control there. Obviously, there is a bit of stress in her life, and she admits to a fair amount of stress eating. She notes no frequent hypoglycemia. Her pump download shows that she is lacking in the testing department, and consequently, she is missing out on her corrective insulin. Shown is less than one test a day for the last two weeks.  She is changing her set every 5 days, rather than the recommended 3 days. She is using about 40 units of insulin daily. She notes no problems with her feet or eyes.       Past Medical History     Patient Active Problem List   Diagnosis     Type 1 diabetes mellitus without complication     Pilonidal cyst        Family History       family history includes Crohn's disease in her sister; No Medical Problems in her brother; Stroke in her maternal grandfather; Thyroid disease in her mother.    Social History      reports that she has never smoked. She has never used smokeless tobacco. She reports that she drinks alcohol. She reports that she does not use illicit  "drugs.      Review of Systems     Patient has no polyuria or polydipsia, no chest pain, dyspnea or TIA's, no numbness, tingling or pain in extremities  Remainder negative except as noted in HPI.    Vital Signs     /68 (Patient Site: Right Arm, Patient Position: Sitting, Cuff Size: Adult Regular)  Pulse 78  Ht 5' 5\" (1.651 m)  Wt 152 lb (68.9 kg)  BMI 25.29 kg/m2  Wt Readings from Last 3 Encounters:   04/10/18 152 lb (68.9 kg)   03/01/18 145 lb 6.4 oz (66 kg)   02/13/18 141 lb 8 oz (64.2 kg)       Physical Exam     Constitutional:  Well developed, Well nourished  HENT:  Normocephalic,   Neck: Thyroid normal, No lymph nodes, Supple  Eyes:  PERRL, Conjunctiva pink  Respiratory:  Normal breath sounds, No respiratory distress  Cardiovascular:  Normal heart rate, Normal rhythm, No murmurs  GI:  Bowel sounds normal, Soft, No tenderness  Musculoskeletal:  No gross deformity or lesions, normal dorsalis pedis pulses  Skin: No acanthosis nigricans, lipoatrophy or lipodystrophy  Neurologic:  Alert & oriented x 3, nonfocal  Psychiatric:  Affect, Mood, Insight appropriate  Diabetic foot exam: no ulcers, charcot's or high risk calluses, Normal monofilament exam          Assessment     1. Type 1 diabetes mellitus without complication        Plan     We discussed possibly upgrading to the new 670 pump.  She will look into it.  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  She is considering making changes to her correction dosage, and I encouraged her to get a good set of data before she does so.  I will see her back in 3 months. Time spent with pt today: 30 min with >50% spent in counseling and coordination of care.        Zohra SNYDER Endocrinology  4/11/2018  7:16 AM        Lab Results     Hemoglobin A1c   Date Value Ref Range Status   03/01/2018 8.4 (H) 3.5 - 6.0 % Final     Creatinine   Date Value Ref Range Status   03/01/2018 0.79 0.60 - 1.10 " mg/dL Final     Microalbumin, Random Urine   Date Value Ref Range Status   03/01/2018 1.54 0.00 - 1.99 mg/dL Final       Cholesterol   Date Value Ref Range Status   03/01/2018 165 <=199 mg/dL Final     HDL Cholesterol   Date Value Ref Range Status   03/01/2018 64 >=50 mg/dL Final     LDL Calculated   Date Value Ref Range Status   03/01/2018 89 <=129 mg/dL Final     Triglycerides   Date Value Ref Range Status   03/01/2018 58 <=149 mg/dL Final       No results found for: ALT, AST, GGT, ALKPHOS, BILITOT      Current Medications     Outpatient Medications Prior to Visit   Medication Sig Dispense Refill     norethindrone-ethinyl estradiol (MICROGESTIN 1/20) 1-20 mg-mcg per tablet Take 1 tablet by mouth daily. 3 Package 3     NOVOLOG 100 unit/mL injection Use in insulin pump up to 60 units daily  2     UNABLE TO FIND Med Name:Generic Birth Control, not sure of name, take 1 tablet once a day       No facility-administered medications prior to visit.

## 2021-06-19 NOTE — LETTER
Letter by Pawel Blanchard MD at      Author: Pawel Blanchard MD Service: -- Author Type: --    Filed:  Encounter Date: 3/15/2019 Status: (Other)         Lauren Chauhan  [address]  [place] [address]             March 15, 2019         Dear Ms. Chauhan,    Below are the results from your recent visit:    Resulted Orders   Glycosylated Hemoglobin A1c   Result Value Ref Range    Hemoglobin A1c 7.6 (H) 3.5 - 6.0 %   Microalbumin, Random Urine   Result Value Ref Range    Microalbumin, Random Urine 2.23 (H) 0.00 - 1.99 mg/dL    Creatinine, Urine 194.2 mg/dL    Microalbumin/Creatinine Ratio Random Urine 11.5 <=19.9 mg/g    Narrative    Microalbumin, Random Urine  <2.0 mg/dL . . . . . . . . Normal  3.0-30.0 mg/dL . . . . . . Microalbuminuria  >30.0 mg/dL . . . . . .  . Clinical Proteinuria    Microalbumin/Creatinine Ratio, Random Urine  <20 mg/g . . . . .. . . . Normal   mg/g . . . . . . . Microalbuminuria  >300 mg/g . . . . . . . . Clinical Proteinuria     Basic Metabolic Panel   Result Value Ref Range    Sodium 137 136 - 145 mmol/L    Potassium 3.8 3.5 - 5.0 mmol/L    Chloride 101 98 - 107 mmol/L    CO2 28 22 - 31 mmol/L    Anion Gap, Calculation 8 5 - 18 mmol/L    Glucose 279 (H) 70 - 125 mg/dL    Calcium 9.5 8.5 - 10.5 mg/dL    BUN 16 8 - 22 mg/dL    Creatinine 0.97 0.60 - 1.10 mg/dL    GFR MDRD Af Amer >60 >60 mL/min/1.73m2    GFR MDRD Non Af Amer >60 >60 mL/min/1.73m2    Narrative    Fasting Glucose reference range is 70-99 mg/dL per  American Diabetes Association (ADA) guidelines.   Lipid Profile   Result Value Ref Range    Triglycerides 103 <=149 mg/dL    Cholesterol 184 <=199 mg/dL    LDL Calculated 95 <=129 mg/dL    HDL Cholesterol 68 >=50 mg/dL    Patient Fasting > 8hrs? No           Elevated HgA1c, better in comparison to 4 months ago     Elevated BG.     Normal Mircoalbumin    Recheck in 6-12 months     Other labs normal       Please call with questions or contact us using  MyChart.    Sincerely,        Electronically signed by Pawel Blanchard MD

## 2021-06-19 NOTE — PROGRESS NOTES
"[facility]  ENDOCRINOLOGY    Diabetes Note 7/22/2018    Lauren Chauhan, 1990, [id number]          Reason for visit      1. Type 1 diabetes mellitus without complication (H)        HPI     Lauren Chauhan is a very pleasant 27 y.o. old female who presents for follow up.  SUMMARY:  Lauren returns today in f/u for DM 1.  Her lastest A1c is 7.3, down from 8.4.  She states that this has happened because she has been paying more attention.  Imagine that. She continues to use an insulin pump for her insulin management.  Her pump download for the last month shows an average of 124.  She has had a fair amount of hypoglycemia as part of that average, however.  She feels that the hypoglycemia often happens because she over estimates her CHO.  She did not require any outside assistance. Only 16 % of her readings were above range.  She uses about 45 units of insulin daily.  She mentions today that she is now out of warrantee with her pump and is interested in getting another pump, perhaps a different kind.      Blood glucose data:  Ave: 124, SD: 66    Past Medical History     Patient Active Problem List   Diagnosis     Type 1 diabetes mellitus without complication (H)     Pilonidal cyst        Family History       family history includes Crohn's disease in her sister; No Medical Problems in her brother; Stroke in her maternal grandfather; Thyroid disease in her mother.    Social History      reports that she has never smoked. She has never used smokeless tobacco. She reports that she drinks alcohol. She reports that she does not use illicit drugs.      Review of Systems     Patient has no polyuria or polydipsia, no chest pain, dyspnea or TIA's, no numbness, tingling or pain in extremities  Remainder negative except as noted in HPI.    Vital Signs     /72 (Patient Site: Right Arm, Patient Position: Sitting, Cuff Size: Adult Regular)  Pulse 68  Ht 5' 5\" (1.651 m)  Wt 156 lb 6.4 oz (70.9 kg)  BMI 26.03 " kg/m2  Wt Readings from Last 3 Encounters:   07/17/18 156 lb 6.4 oz (70.9 kg)   04/10/18 152 lb (68.9 kg)   03/01/18 145 lb 6.4 oz (66 kg)       Physical Exam     Constitutional:  Well developed, Well nourished  HENT:  Normocephalic,   Neck: Thyroid normal, No lymph nodes, Supple  Eyes:  PERRL, Conjunctiva pink  Respiratory:  Normal breath sounds, No respiratory distress  Cardiovascular:  Normal heart rate, Normal rhythm, No murmurs  GI:  Bowel sounds normal, Soft, No tenderness  Musculoskeletal:  No gross deformity or lesions, normal dorsalis pedis pulses  Skin: No acanthosis nigricans, lipoatrophy or lipodystrophy      Assessment     1. Type 1 diabetes mellitus without complication (H)        Plan     No setting changes necessary today.  Encouraged to work on her CHO estimation.  Referral made to CDE to help with new pump.  She will f/u with me in 3 months. Time spent with pt today: 25 min with >50% spent in counseling and coordination of care.      Zohra SNYDER Endocrinology  7/22/2018  12:00 PM        Lab Results     Hemoglobin A1c   Date Value Ref Range Status   07/10/2018 7.3 (H) 3.5 - 6.0 % Final   03/01/2018 8.4 (H) 3.5 - 6.0 % Final     Creatinine   Date Value Ref Range Status   07/10/2018 0.74 0.60 - 1.10 mg/dL Final   03/01/2018 0.79 0.60 - 1.10 mg/dL Final     Microalbumin, Random Urine   Date Value Ref Range Status   03/01/2018 1.54 0.00 - 1.99 mg/dL Final       Cholesterol   Date Value Ref Range Status   03/01/2018 165 <=199 mg/dL Final     HDL Cholesterol   Date Value Ref Range Status   03/01/2018 64 >=50 mg/dL Final     LDL Calculated   Date Value Ref Range Status   03/01/2018 89 <=129 mg/dL Final     Triglycerides   Date Value Ref Range Status   03/01/2018 58 <=149 mg/dL Final       No results found for: ALT, AST, GGT, ALKPHOS, BILITOT      Current Medications     Outpatient Medications Prior to Visit   Medication Sig Dispense Refill     norethindrone-ethinyl estradiol (MICROGESTIN 1/20) 1-20  mg-mcg per tablet Take 1 tablet by mouth daily. 3 Package 3     metroNIDAZOLE (FLAGYL) 500 MG tablet   0     NOVOLOG U-100 INSULIN ASPART 100 unit/mL injection Use in insulin pump up to 60 units daily 54 mL 0     No facility-administered medications prior to visit.

## 2021-06-19 NOTE — PROGRESS NOTES
Assessment: pt seen today for DM education. She has a Medtronic Paradigm pump that is out of warranty. She is interested in getting a new pump. Reviewed all different pumps and sensors. Pt would like to go ahead and try for the Medtronic 670 system. Will have Laney sign paperwork to send in.   Pt has no concerns over her current pump or rates. She feels things have been going well. She has had any severe low BG and notes the highest may be 290 (rarely).     Plan: should hear from [employer] within 1 week. Call to schedule training for 670 system once everything is arrived OR call to inform if you are not going through with the 670 system and will go from there.     Subjective and Objective:      Lauren Chauhan is referred by Laney Dorantes NP for Diabetes Education.     Lab Results   Component Value Date    HGBA1C 7.3 (H) 07/10/2018       Basal Rates:  Rate 1: 12am: 1.25  Rate 2: 8am: 0.90  Rate 3: 10pm: 0.80      Carbohydrate Ratio:  12am-12am 10  Grams/unit    Correction/Sensitivity:  12am- 12am  45 mg/dL/unit      Blood Glucose Target Range:  12am-12am 100-110      Active Insulin: _4__ hours        Education:     Monitoring   Meter (per above goals): Discussed  Monitoring: Assessed and Discussed  BG goals: Discussed    Nutrition Management  Nutrition Management: Assessed and Discussed  Weight: Not addressed  Portions/Balance: Assessed and Discussed  Carb ID/Count: Assessed and Discussed  Label Reading: Not addressed  Heart Healthy Fats: Not addressed  Menu Planning: Assessed and Discussed  Dining Out: Not addressed  Physical Activity: Discussed  Medications: Discussed  Orals: Discussed  Injected Medications: Competent   Storage/Exp:Competent   Site Rotation: Competent   Sites Assessed: no    Diabetes Disease Process: Not addressed    Acute Complications: Prevent, Detect, Treat:  Hypoglycemia: Assessed and Discussed  Hyperglycemia: Assessed and Discussed  Sick Days: Not addressed  Driving: Not  addressed    Chronic Complications  Foot Care:Not addressed  Skin Care: Not addressed  Eye: Not addressed  ABC: Discussed  Teeth:Not addressed  Goal Setting and Problem Solving: Assessed and Discussed  Barriers: Assessed and Discussed  Psychosocial Adjustments: Assessed and Discussed      Time spent with the patient: 60 minutes for diabetes education and counseling.   Previous Education: no  Visit Type:THERESA Arvizu  8/9/2018         I agree with the aforementioned diabetes plan.  Zohra TORRES [facility] Endocrinology  8/9/2018  11:51 AM

## 2021-06-20 NOTE — PROGRESS NOTES
"HPI: Pt is here for follow up of a pilonidal abscess.  She is doing well. Her appetite is good, and bowel function regular.  No fevers or chills. Ambulating without problems.     Allergies, Medications, Social History, Past Medical History and Past Surgical History were reviewed and are noted in the chart.    /77 (Patient Site: Right Arm, Patient Position: Sitting, Cuff Size: Adult Small)  Pulse 82  Ht 5' 5\" (1.651 m)  Wt 151 lb (68.5 kg)  LMP 08/21/2018  SpO2 99%  BMI 25.13 kg/m2      EXAM: This is a  28 y.o. female in no distress  GENERAL: Appears well  BUTTOCKS: Erythema resolved. Repacked wound. Still has significant cavity deep to sinus.      Assessment/Plan: Lauren Chauhan is following up after Pilonidal abscess. Doing well. Continue packing. Return in 4 weeks or sooner if any problems.    Adriel Banuelos MD  [facility] Department of Surgery  "

## 2021-06-20 NOTE — PROGRESS NOTES
HPI: Pt is here for follow up of a pilonidal abscess.  She is doing well. Her appetite is good, and bowel function regular.  No fevers or chills. Ambulating without problems.  He had had difficulty packing it as the sinus has closed down.    Allergies, Medications, Social History, Past Medical History and Past Surgical History were reviewed and are noted in the chart.    /72 (Patient Site: Right Arm, Patient Position: Sitting, Cuff Size: Adult Regular)  Pulse 94  SpO2 100%  Breastfeeding? No      EXAM: This is a  28 y.o. female in no distress  GENERAL: Appears well  BUTTOCKS: Sinus cavity closed down.  When I probed it with a Q-tip, I entered a cavity with turbid fluid.  I enlarged the sinus with a forcep, and then was able to pack it with quarter inch gauze.  The cavity is still large.      Assessment/Plan: Lauren BEN Chauhan is following up after pilonidal abscess. Doing well.. Return in 2 weeks or sooner if any problems.  I discussed with her the importance of monitoring her insulin supplies so she does not run out the way she did last week.  I also discussed with her that if the cavity does not show signs of closing, we may need to take her to the operating room and widely open the wound and clean out the pilonidal disease at this point.    Adriel Banuelos MD  [facility] Department of Surgery

## 2021-06-20 NOTE — LETTER
Letter by Pawel Blanchard MD at      Author: Pawel Blanchard MD Service: -- Author Type: --    Filed:  Encounter Date: 5/15/2020 Status: (Other)         Lauren Chauhan  [address]  [place] [address]      05/20/20      Dear Lauren,      In reviewing your records, we have determined a medication check is needed before your next refill, please call the [facility] to schedule an appointment.      Medication check/review      We have made attempts to call you for an appointment, please verify your contact information is correct when calling back for an appointment, or if you have transferred your care to another clinic, please contact us so we can update our records.     Please call [phone] to schedule an appointment.    We believe that a strong preventative care program, including regular physicals and follow-up care is an important part of a healthy lifestyle and we are committed to helping you maintain your health.    Thank you for choosing us as your health care provider.    Sincerely,    Jhoana Barger  [facility]  [address] [place] [id number] [phone]

## 2021-06-20 NOTE — PROGRESS NOTES
"HPI: Pt is here for follow up of a pilonidal abscess.  We had drained this earlier this year, and she had done well but 3 days ago developed discomfort and then the area swelled up.  It drained last night, and she went to the emergency room and they opened it further and packed it.  No fevers or chills, though she did have sweats yesterday.  Currently, she feels better and the erythema is decreasing.  Of note, she has an implanted insulin pump.    Allergies, Medications, Social History, Past Medical History and Past Surgical History were reviewed and are noted in the chart.    BP (!) 148/99 (Patient Site: Right Arm, Patient Position: Sitting, Cuff Size: Adult Small)  Pulse (!) 112  Ht 5' 5\" (1.651 m)  Wt 155 lb (70.3 kg)  LMP 2018  SpO2 97%  BMI 25.79 kg/m2      EXAM: This is a  27 y.o. female in no distress  GENERAL: Appears well  BUTTOCKS: Erythematous area at the superior end of the  cleft, decreased from last night demarcation in the ER.  I repacked the cavity, which still had a fair amount of infected drainage.      Assessment/Plan: Lauren Chauhan has a recurrent pilonidal abscess.  We will pack this and she will return in 2 weeks time for reevaluation.  If the infection has settled down at that time, I will set her up for definitive surgery.  She has a prescription for antibiotics from the emergency room, but I do not think that she needs to take those unless she has malaise, fevers or chills and I gave her that information.  She will also call me if things worsen.    Adriel Banuelos MD  [facility] Department of Surgery  "

## 2021-06-20 NOTE — LETTER
Letter by Pawel Blanchard MD at      Author: Pawel Blanchard MD Service: -- Author Type: --    Filed:  Encounter Date: 5/28/2020 Status: (Other)       Lauren Chauhan  [address]  [place] [facility] [address]      06/02/20      Dear Lauren,      In reviewing your records, we have determined a medication check is needed before your next refill, please call the [facility] to schedule an appointment.      Diabetic Medication check/review      We have made attempts to call you for an appointment, please verify your contact information is correct when calling back for an appointment, or if you have transferred your care to another clinic, please contact us so we can update our records.     Please call [phone] to schedule an appointment.    We believe that a strong preventative care program, including regular physicals and follow-up care is an important part of a healthy lifestyle and we are committed to helping you maintain your health.    Thank you for choosing us as your health care provider.    Sincerely,    Sandrine Quintana CMA - CMT/CA  [facility]  [address] [address] [phone]

## 2021-06-21 NOTE — PROGRESS NOTES
Preoperative Exam    Scheduled Procedure: Cyst Removal- Lower Back  Surgery Date:  11/20/2018  Surgery Location: [facility], fax [phone]    Surgeon:  Dr. Banuelos    Assessment/Plan:     1. Pre-op exam  Basic Metabolic Panel    HM2(CBC w/o Differential)    Basic Metabolic Panel    HM2(CBC w/o Differential)   2. Type 1 diabetes mellitus without complication (H)  Basic Metabolic Panel    HM2(CBC w/o Differential)    Glycosylated Hemoglobin A1c    Basic Metabolic Panel    HM2(CBC w/o Differential)    Glycosylated Hemoglobin A1c   3. Pilonidal cyst           Surgical Procedure Risk: Low (reported cardiac risk generally < 1%)  Have you had prior anesthesia?: No  Have you or any family members had a previous anesthesia reaction:  No  Do you or any family members have a history of a clotting or bleeding disorder?: No  Cardiac Risk Assessment: no increased risk for major cardiac complications    Patient approved for surgery with general or local anesthesia.    Please Note:  type 1 diabetes; well controlled with insulin pump    Functional Status: Partially Dependent: Fiance will be helping with after care  Patient plans to recover at home with family.     Subjective:      Lauren Chauhan is a 28 y.o. female who presents for a preoperative consultation and establish care visit.  She is a type 1 diabetic and is under good control with insulin pump, A1C was 7.2 4 months ago and she has no history of recent ketoacidosis or hospitaliztions.  She has never had surgery before.  She denies any current fever, rash, malaise associated with pilonidal cyst  Her blood sugar has been well controlled, she did not check it this morning.  He has a insulin pump for her type 1 diabetes.  Does not have continuous glucose monitoring as part of this pump.  Early works with a diabetic educator, dietitian.  He has not had a episode of ketoacidosis for several years, she has not had any recent hospitalizations.  ROS: Patient  denies fever, chills, sweats, fainting, fatigue, weight change, dizziness, sleep problems, chest pain, palpitations, shortness of breath, wheezing, cough,  sore throat, changes in hearing, ear pain,tinnitus,  disphagia, sore throat, globus, changes in vision, eye pain eye redness, acid reflux, nausea, vomiting, diarrhea, constipation, black or bloody stools,  Dysuria, frequency, urinary incontinence, nocturia, hematuria, back pain,joint pain, bone pain, muscle cramps,edema, weakness, numbness, tingling of extremities, rash, itching, skin changes, swollen lymph nodes, thirst, increased urination, breast lumps, breast pain, nipple discharge, memory difficulties, anxiety, mood swings, (female)vaginal discharge, dyspareunia, menorrhagia, pelvic pain, sexual dysfunction,   (male) testicular lumps, erectile dysfunction.  Pertinent History  Do you have difficulty breathing or chest pain after walking up a flight of stairs: No  History of obstructive sleep apnea: No  Steroid use in the last 6 months: No  Frequent Aspirin/NSAID use: No  Prior Blood Transfusion: No  Prior Blood Transfusion Reaction: No  If for some reason prior to, during or after the procedure, if it is medically indicated, would you be willing to have a blood transfusion?:  There is no transfusion refusal.    Current Outpatient Medications   Medication Sig Dispense Refill     blood glucose test (CONTOUR NEXT TEST STRIPS) strips Test up to 6 times daily 200 strip 5     insulin lispro (ADMELOG SOLOSTAR U-100 INSULIN) 100 unit/mL pen Use up to 65 units daily in insulin pump; please dispense vials, not pens 60 mL 0     norethindrone-ethinyl estradiol (MICROGESTIN 1/20) 1-20 mg-mcg per tablet Take 1 tablet by mouth daily. 3 Package 3     NOVOLOG U-100 INSULIN ASPART 100 unit/mL injection Use in insulin pump up to 60 units daily 10 mL 11     No current facility-administered medications for this visit.         No Known Allergies    Patient Active Problem List  "  Diagnosis     Type 1 diabetes mellitus without complication (H)     Pilonidal cyst     Pre-op exam       Past Medical History:   Diagnosis Date     Diabetes mellitus (H)     Type 1, diagnosed 10/2007     Pilonidal cyst      Type 1 diabetes mellitus without complication (H) 3/1/2018       Past Surgical History:   Procedure Laterality Date     MOUTH SURGERY       NO PAST SURGERIES       WISDOM TOOTH EXTRACTION         Social History     Socioeconomic History     Marital status: Single     Spouse name: Not on file     Number of children: Not on file     Years of education: Not on file     Highest education level: Not on file   Social Needs     Financial resource strain: Not on file     Food insecurity - worry: Not on file     Food insecurity - inability: Not on file     Transportation needs - medical: Not on file     Transportation needs - non-medical: Not on file   Occupational History     Not on file   Tobacco Use     Smoking status: Never Smoker     Smokeless tobacco: Never Used   Substance and Sexual Activity     Alcohol use: Yes     Comment: occasionally     Drug use: No     Sexual activity: Not on file   Other Topics Concern     Not on file   Social History Narrative     Not on file       PCP:  Hawa Saul PA-C        Objective:     Vitals:    11/13/18 0845   BP: 104/70   Pulse: 90   SpO2: 100%   Weight: 153 lb 4 oz (69.5 kg)   Height: 5' 5\" (1.651 m)   LMP: 11/03/2018         Physical Exam:   /70 (Patient Site: Right Arm, Patient Position: Sitting, Cuff Size: Adult Regular)   Pulse 90   Ht 5' 5\" (1.651 m)   Wt 153 lb 4 oz (69.5 kg)   LMP 11/03/2018 (Exact Date)   SpO2 100%   Breastfeeding? No   BMI 25.50 kg/m      Alert, cooperative, well-hydrated.  Appears well.  Eyes: Pupils equal, round, reactive to light.  HEENT: Sclera white, nares patent, MMM   Lungs: Clear to auscultation. No retractions, no increased work of respiration, equal chest rise.   Heart: Regular rate and rhythm, no murmurs, " clicks,    Gallops.  Abdomen: Soft, bowel sounds in 4 quadrants with no tenderness to palpation, no organomegaly or masses, no aortic or renal bruits.  Extremities: no tenderness to palpation of gastrocnemius, bilaterally.  Skin: no increased warmth, edema, or erythema of lower legs bilaterally.  Back:  No cervical, thoracic or lumbar tenderness to spinous processes or musculature.  Neuro:pupils equal and reactive to light bilaterally, Extraocular muscles intact, face symmetric, oropharynx clear, facial sensation intact, tenderness over temple and brow area bilaterally with palpation. CN II - XII grossly intact.  No focal motor/sensory deficits.   Extremities:  No edema, muscle strength 5 out of 5 all extremities    There are no Patient Instructions on file for this visit.        Labs:  Recent Results (from the past 240 hour(s))   Basic Metabolic Panel    Collection Time: 11/13/18  9:21 AM   Result Value Ref Range    Sodium 139 136 - 145 mmol/L    Potassium 4.1 3.5 - 5.0 mmol/L    Chloride 104 98 - 107 mmol/L    CO2 26 22 - 31 mmol/L    Anion Gap, Calculation 9 5 - 18 mmol/L    Glucose 105 70 - 125 mg/dL    Calcium 9.7 8.5 - 10.5 mg/dL    BUN 14 8 - 22 mg/dL    Creatinine 0.76 0.60 - 1.10 mg/dL    GFR MDRD Af Amer >60 >60 mL/min/1.73m2    GFR MDRD Non Af Amer >60 >60 mL/min/1.73m2   HM2(CBC w/o Differential)    Collection Time: 11/13/18  9:21 AM   Result Value Ref Range    WBC 4.6 4.0 - 11.0 thou/uL    RBC 4.90 3.80 - 5.40 mill/uL    Hemoglobin 14.3 12.0 - 16.0 g/dL    Hematocrit 41.8 35.0 - 47.0 %    MCV 85 80 - 100 fL    MCH 29.1 27.0 - 34.0 pg    MCHC 34.1 32.0 - 36.0 g/dL    RDW 11.2 11.0 - 14.5 %    Platelets 270 140 - 440 thou/uL    MPV 8.6 7.0 - 10.0 fL   Glycosylated Hemoglobin A1c    Collection Time: 11/13/18  9:21 AM   Result Value Ref Range    Hemoglobin A1c 7.8 (H) 3.5 - 6.0 %         There is no immunization history on file for this patient.        Electronically signed by Hawa Saul PA-C  11/19/18 8:49 AM

## 2021-06-21 NOTE — ANESTHESIA CARE TRANSFER NOTE
Last vitals:   Vitals:    11/20/18 1315   BP: 130/69   Pulse: 77   Resp: 16   Temp: 36.2  C (97.1  F)   SpO2: 98%     Patient's level of consciousness is drowsy  Spontaneous respirations: yes  Maintains airway independently: yes  Dentition unchanged: yes  Oropharynx: oropharynx clear of all foreign objects    QCDR Measures:  ASA# 20 - Surgical Safety Checklist: WHO surgical safety checklist completed prior to induction    PQRS# 430 - Adult PONV Prevention: 4558F - Pt received => 2 anti-emetic agents (different classes) preop & intraop  ASA# 8 - Peds PONV Prevention: NA - Not pediatric patient, not GA or 2 or more risk factors NOT present  PQRS# 424 - Felipa-op Temp Management: 4559F - At least one body temp DOCUMENTED => 35.5C or 95.9F within required timeframe  PQRS# 426 - PACU Transfer Protocol: - Transfer of care checklist used  ASA# 14 - Acute Post-op Pain: ASA14B - Patient did NOT experience pain >= 7 out of 10

## 2021-06-21 NOTE — ANESTHESIA PREPROCEDURE EVALUATION
Anesthesia Evaluation      Patient summary reviewed   No history of anesthetic complications     Airway   Mallampati: II  Neck ROM: full   Pulmonary - negative ROS and normal exam                          Cardiovascular - negative ROS and normal exam   Neuro/Psych - negative ROS     Endo/Other    (+) diabetes mellitus type 1 using insulin,      GI/Hepatic/Renal - negative ROS      Other findings: No prior anesthetics. No family history of anesthetic complications.      Dental - normal exam                        Anesthesia Plan  Planned anesthetic: general endotracheal  - apple juice at 9AM  - insulin pump removed at 8AM, glucose ~180s  - scopolamine patch  ASA 2   Induction: intravenous   Anesthetic plan and risks discussed with: patient    Post-op plan: routine recovery

## 2021-06-21 NOTE — ANESTHESIA POSTPROCEDURE EVALUATION
Patient: Lauren Chauhan  PILONIDAL TRACTOTECTOMY  Anesthesia type: general    Patient location: Phase II Recovery  Last vitals:   Vitals:    11/20/18 1345   BP: 102/52   Pulse: 84   Resp: 18   Temp:    SpO2: 99%     Post vital signs: stable  Level of consciousness: awake and responds to simple questions  Post-anesthesia pain: pain controlled  Post-anesthesia nausea and vomiting: no  Pulmonary: unassisted  Cardiovascular: stable  Hydration: adequate  Anesthetic events: no    QCDR Measures:  ASA# 11 - Felipa-op Cardiac Arrest: ASA11B - Patient did NOT experience unanticipated cardiac arrest  ASA# 12 - Felipa-op Mortality Rate: ASA12B - Patient did NOT die  ASA# 13 - PACU Re-Intubation Rate: ASA13B - Patient did NOT require a new airway mgmt  ASA# 10 - Composite Anes Safety: ASA10A - No serious adverse event    Additional Notes:

## 2021-06-21 NOTE — PROGRESS NOTES
"HPI: Pt is here for follow up of a pilonidal sinus with abscess cavity.  She is doing well. Her appetite is good, and bowel function regular.  No fevers or chills. Ambulating without problems.  Her sugars have been under good control.  Her boyfriend is packing the wound, and thinks it may be getting a little smaller.    Allergies, Medications, Social History, Past Medical History and Past Surgical History were reviewed and are noted in the chart.    /84 (Patient Site: Right Arm, Patient Position: Sitting, Cuff Size: Adult Small)  Pulse 86  Ht 5' 5\" (1.651 m)  Wt 151 lb (68.5 kg)  SpO2 98%  BMI 25.13 kg/m2      EXAM: This is a  28 y.o. female in no distress  GENERAL: Appears well  BUTTOCKS: Pilonidal sinus with extension of the abscess cavity superiorly.  It seems to be somewhat smaller than on the previous occasion.  I repacked it.      Assessment/Plan: Lauren Chauhan is following up for her pilonidal disease. Doing well.  We discussed the situation at some length.  The alternatives at this point are to continue to pack the cavity until it is healed, at which point she will need definitive surgery for her pilonidal disease.  Alternatively, we could operate sooner, which would result in a larger cavity but it may hasten the healing as the wound would be better packed.  It suits her schedule to proceed sooner rather than later, and we will set her up for a pilonidal tractotomy.      Adriel Banuelos MD  [facility] Department of Surgery  "

## 2021-06-22 NOTE — PROGRESS NOTES
"HPI: Pt is here for follow up of a pilonidal tractotomy.  She is doing well. Her appetite is good, and bowel function regular.  No fevers or chills. Ambulating without problems.     Allergies, Medications, Social History, Past Medical History and Past Surgical History were reviewed and are noted in the chart.    /73 (Patient Site: Right Arm, Patient Position: Sitting, Cuff Size: Adult Small)   Pulse 82   Ht 5' 5\" (1.651 m)   Wt 153 lb (69.4 kg)   SpO2 100%   BMI 25.46 kg/m        EXAM: This is a  28 y.o. female in no distress  GENERAL: Appears well  BUTTOCKS: Wound healing cleanly, but still has quite a bit of healing to do.  Mild odor to the wound.      Assessment/Plan: Lauren Chauhan is following up after pilonidal tractotomy. Doing well.  I have suggested that she add 1/4 teaspoon of vinegar to her saline.  Return in 6 weeks or sooner if any problems.    Adriel Banuelos MD  [facility] Department of Surgery  "

## 2021-06-22 NOTE — PROGRESS NOTES
"HPI: Pt is here for follow up of a pilonidal tractotomy.  She is doing well. Her appetite is good, and bowel function regular.  No fevers or chills. Ambulating without problems.     Allergies, Medications, Social History, Past Medical History and Past Surgical History were reviewed and are noted in the chart.    /76 (Patient Site: Right Arm, Patient Position: Sitting, Cuff Size: Adult Large)   Pulse 96   Ht 5' 5\" (1.651 m)   Wt 153 lb (69.4 kg)   LMP 11/03/2018 (Exact Date)   SpO2 99%   Breastfeeding? No   BMI 25.46 kg/m        EXAM: This is a  28 y.o. female in no distress  GENERAL: Appears well  CHEST clear  CVS S1S2 NSR  BUTTOCKS: Wound granulating cleanly. No tenderness or erythema of surrounding tissues. I repacked the wound.      Assessment/Plan: Lauren BEN Chauhan is following up after pilonidal tractotomy. Doing well. Return in 4 weeks or sooner if any problems.    Adriel Banuelos MD  [facility] Department of Surgery  "

## 2021-06-23 NOTE — TELEPHONE ENCOUNTER
Pharmacy requesting refill of Norethindrone Acet-Ethinyl     Last refill: 3/1/18    Last OV:   Pre op with Hawa 11/13/18  Physical with Dr. MEADE 3/1/18

## 2021-06-24 NOTE — PROGRESS NOTES
Assessment/Plan:        1. Encounter for contraceptive management, unspecified type  Doing well.   refill  - norethindrone-ethinyl estradiol (MICROGESTIN 1/20) 1-20 mg-mcg per tablet; Take 1 tablet by mouth daily.  Dispense: 3 Package; Refill: 4        Follow up for routine physicals.       Subjective:    Patient ID:   Lauren Chauhan is a 28 y.o. female comes in follow up to her OCP, needing a refill today.  She originally made this appointment for a physical not realizing that her insurance would not cover until after March.  she will make a follow up appointment for that.     No other concerns today.        Review of Systems  General : negative  Ophthalmic : negative  ENT : negative  Respiratory : no cough, shortness of breath, or wheezing  Cardiovascular : no chest pain or dyspnea on exertion  Gastrointestinal : no abdominal pain, change in bowel habits, or black or bloody stools  Genito-Urinary :  no dysuria, trouble voiding, or hematuria  Musculoskeletal : negative  Neurological : negative  Dermatological : negative    Allergy: reviewed  Hematological and Lymphatic : negative  Endocrine : negative     The following patient's history were reviewed and updated as appropriate:   She  has a past medical history of Diabetes mellitus (H), Pilonidal cyst, and Type 1 diabetes mellitus without complication (H) (3/1/2018)..      Outpatient Encounter Medications as of 2/27/2019   Medication Sig Dispense Refill     blood glucose test (CONTOUR NEXT TEST STRIPS) strips Test up to 6 times daily 200 strip 5     insulin lispro (ADMELOG SOLOSTAR U-100 INSULIN) 100 unit/mL pen Use up to 65 units daily in insulin pump; please dispense vials, not pens 60 mL 0     norethindrone-ethinyl estradiol (MICROGESTIN 1/20) 1-20 mg-mcg per tablet Take 1 tablet by mouth daily. 3 Package 4     NOVOLOG U-100 INSULIN ASPART 100 unit/mL injection Use in insulin pump up to 60 units daily 10 mL 11     [DISCONTINUED] norethindrone-ethinyl  "estradiol (MICROGESTIN 1/20) 1-20 mg-mcg per tablet Take 1 tablet by mouth daily. 1 Package 0     [DISCONTINUED] docusate sodium (COLACE) 100 MG capsule Take 1 capsule (100 mg total) by mouth 2 (two) times a day. 28 capsule 0     No facility-administered encounter medications on file as of 2/27/2019.          Objective:   /72 (Patient Site: Right Arm, Patient Position: Sitting, Cuff Size: Adult Regular)   Pulse 82   Ht 5' 5\" (1.651 m)   Wt 152 lb 3.2 oz (69 kg)   SpO2 99%   BMI 25.33 kg/m        Physical Exam  General Appearance:    Alert, well hydrated, no distress   Throat:   mucous membranes moist, pharynx normal without lesions   Neck:   Supple, symmetrical, trachea midline, no adenopathy;     thyroid:  no enlargement/tenderness/nodules;    Lungs:     clear to auscultation, no wheezes, rales or rhonchi, symmetric air entry     Heart:    Regular rate and rhythm, S1 and S2 normal, no murmur, rub   or gallop, no edema    Skin:   Skin color, texture, turgor normal, no rashes or lesions      "

## 2021-06-25 NOTE — PROGRESS NOTES
Assessment/Plan:        1. Routine history and physical examination of adult  - Lipid Profile    2. Type 1 diabetes mellitus without complication (H)  - insulin lispro (ADMELOG SOLOSTAR U-100 INSULIN) 100 unit/mL pen; Use up to 65 units daily in insulin pump; please dispense vials, not pens  Dispense: 60 mL; Refill: 0  - Glycosylated Hemoglobin A1c  - Microalbumin, Random Urine  - Basic Metabolic Panel      ______________________________________________________________________    Laurenjasmin Chauhan is a 28 y.o. female coming in today for a routine physical.    Additional issues outside the physical to discuss:   Asking for refill on her insulin until be seen by her endocrinologist    Past Medical History:   Diagnosis Date     Diabetes mellitus (H)     Type 1, diagnosed 10/2007     Pilonidal cyst      Type 1 diabetes mellitus without complication (H) 3/1/2018       Past Surgical History:   Procedure Laterality Date     MOUTH SURGERY       NO PAST SURGERIES       PILONIDAL CYST / SINUS EXCISION N/A 11/20/2018    Procedure: PILONIDAL TRACTOTECTOMY;  Surgeon: Adriel Banuelos MD;  Location: [facility];  Service: General     WISDOM TOOTH EXTRACTION          Family History   Problem Relation Age of Onset     Thyroid disease Mother      Crohn's disease Sister      No Medical Problems Brother      Stroke Maternal Grandfather      No Medical Problems Father      No Medical Problems Sister        Social History     Socioeconomic History     Marital status: Single     Spouse name: None     Number of children: None     Years of education: None     Highest education level: None   Occupational History     None   Social Needs     Financial resource strain: None     Food insecurity:     Worry: None     Inability: None     Transportation needs:     Medical: None     Non-medical: None   Tobacco Use     Smoking status: Never Smoker     Smokeless tobacco: Never Used   Substance and Sexual Activity     Alcohol use: Yes      Comment: occasionally     Drug use: No     Sexual activity: None   Lifestyle     Physical activity:     Days per week: None     Minutes per session: None     Stress: None   Relationships     Social connections:     Talks on phone: None     Gets together: None     Attends Jewish service: None     Active member of club or organization: None     Attends meetings of clubs or organizations: None     Relationship status: None     Intimate partner violence:     Fear of current or ex partner: None     Emotionally abused: None     Physically abused: None     Forced sexual activity: None   Other Topics Concern     None   Social History Narrative     None          Current Outpatient Medications:      blood glucose test (CONTOUR NEXT TEST STRIPS) strips, Test up to 6 times daily, Disp: 200 strip, Rfl: 5     insulin lispro (ADMELOG SOLOSTAR U-100 INSULIN) 100 unit/mL pen, Use up to 65 units daily in insulin pump; please dispense vials, not pens, Disp: 60 mL, Rfl: 0     norethindrone-ethinyl estradiol (MICROGESTIN 1/20) 1-20 mg-mcg per tablet, Take 1 tablet by mouth daily., Disp: 3 Package, Rfl: 4     NOVOLOG U-100 INSULIN ASPART 100 unit/mL injection, Use in insulin pump up to 60 units daily, Disp: 10 mL, Rfl: 11    There is no immunization history on file for this patient.   ______________________________________________________________________    ROS:  Review of Systems -   General ROS: negative  Ophthalmic ROS: negative  ENT ROS: negative  Allergy and Immunology ROS: negative  Hematological and Lymphatic ROS: negative  Endocrine ROS: negative  Breast ROS: negative for breast lumps or nipple discharge  Respiratory ROS: no cough, shortness of breath, or wheezing  Cardiovascular ROS: no chest pain or dyspnea on exertion  Gastrointestinal ROS: no abdominal pain, change in bowel habits, or black or bloody stools  Genito-Urinary ROS: no dysuria, trouble voiding, or hematuria, no significant Vaginal discharge  Musculoskeletal  "ROS: negative  Neurological ROS: negative  Dermatological ROS: negative       ______________________________________________________________________     Exam:    Wt Readings from Last 3 Encounters:   03/14/19 150 lb 12.8 oz (68.4 kg)   02/27/19 152 lb 3.2 oz (69 kg)   12/18/18 153 lb (69.4 kg)     BP Readings from Last 3 Encounters:   03/14/19 116/78   02/27/19 114/72   12/18/18 131/73     /78 (Patient Site: Right Arm, Patient Position: Sitting, Cuff Size: Adult Regular)   Pulse 60   Ht 5' 5\" (1.651 m)   Wt 150 lb 12.8 oz (68.4 kg)   SpO2 100%   BMI 25.09 kg/m         General appearance - alert, well appearing, and in no distress  Eyes - pupils equal and reactive, extraocular eye movements intact  Ears - bilateral TM's and external ear canals normal  Nose - normal and patent, no erythema, discharge or polyps  Mouth - mucous membranes moist, pharynx normal without lesions  Neck - supple, no significant adenopathy  Lymphatics - no palpable lymphadenopathy, no hepatosplenomegaly  Chest - clear to auscultation, no wheezes, rales or rhonchi, symmetric air entry  Heart - normal rate, regular rhythm, normal S1, S2, no murmurs, rubs, clicks or gallops  Abdomen - soft, nontender, nondistended, no masses or organomegaly  Back exam - full range of motion, no tenderness, palpable spasm or pain on motion  Neurological - alert, oriented, normal speech, no focal findings or movement disorder noted  Musculoskeletal - no joint tenderness, deformity or swelling  Extremities - peripheral pulses normal, no pedal edema, no clubbing or cyanosis  Skin - normal coloration and turgor, no rashes, no suspicious skin lesions noted      "

## 2021-06-25 NOTE — PROGRESS NOTES
Patient is in clinic will call her tomorrow to see if she wants to establish care with a PCP at the [facility]   Greta Adrian   [phone]  Clinic Care Coordinator

## 2021-06-26 ENCOUNTER — HEALTH MAINTENANCE LETTER (OUTPATIENT)
Age: 31
End: 2021-06-26

## 2021-07-03 NOTE — ADDENDUM NOTE
Addendum Note by Pawel Blanchard MD at 3/2/2018  6:40 PM     Author: Pawel Blanchard MD Service: -- Author Type: Physician    Filed: 3/2/2018  6:40 PM Encounter Date: 3/1/2018 Status: Signed    : Pawel Blanchard MD (Physician)    Addended by: PAWEL BLANCHARD on: 3/2/2018 06:40 PM        Modules accepted: Orders

## 2021-07-17 ENCOUNTER — HOSPITAL ENCOUNTER (EMERGENCY)
Facility: CLINIC | Age: 31
Discharge: HOME | End: 2021-07-17
Attending: EMERGENCY MEDICINE | Admitting: EMERGENCY MEDICINE
Payer: COMMERCIAL

## 2021-07-17 VITALS
DIASTOLIC BLOOD PRESSURE: 86 MMHG | OXYGEN SATURATION: 100 % | HEIGHT: 64 IN | BODY MASS INDEX: 27.31 KG/M2 | RESPIRATION RATE: 12 BRPM | HEART RATE: 88 BPM | WEIGHT: 160 LBS | SYSTOLIC BLOOD PRESSURE: 107 MMHG | TEMPERATURE: 98.3 F

## 2021-07-17 DIAGNOSIS — R55 SYNCOPE, UNSPECIFIED SYNCOPE TYPE: ICD-10-CM

## 2021-07-17 DIAGNOSIS — E16.2 HYPOGLYCEMIA: ICD-10-CM

## 2021-07-17 LAB
ALBUMIN SERPL-MCNC: 4.2 G/DL (ref 3.4–5)
ALBUMIN UR-MCNC: NEGATIVE MG/DL
ALP SERPL-CCNC: 65 U/L (ref 40–150)
ALT SERPL W P-5'-P-CCNC: 19 U/L (ref 0–50)
ANION GAP SERPL CALCULATED.3IONS-SCNC: 8 MMOL/L (ref 3–14)
APPEARANCE UR: ABNORMAL
AST SERPL W P-5'-P-CCNC: 22 U/L (ref 0–45)
BACTERIA #/AREA URNS HPF: ABNORMAL /HPF
BASOPHILS # BLD AUTO: 0 10E3/UL (ref 0–0.2)
BASOPHILS NFR BLD AUTO: 0 %
BILIRUB SERPL-MCNC: 0.4 MG/DL (ref 0.2–1.3)
BILIRUB UR QL STRIP: NEGATIVE
BUN SERPL-MCNC: 13 MG/DL (ref 7–30)
CALCIUM SERPL-MCNC: 8.8 MG/DL (ref 8.5–10.1)
CHLORIDE BLD-SCNC: 102 MMOL/L (ref 94–109)
CO2 SERPL-SCNC: 26 MMOL/L (ref 20–32)
COLOR UR AUTO: ABNORMAL
CREAT SERPL-MCNC: 0.65 MG/DL (ref 0.52–1.04)
EOSINOPHIL # BLD AUTO: 0 10E3/UL (ref 0–0.7)
EOSINOPHIL NFR BLD AUTO: 0 %
ERYTHROCYTE [DISTWIDTH] IN BLOOD BY AUTOMATED COUNT: 12.3 % (ref 10–15)
GFR SERPL CREATININE-BSD FRML MDRD: >90 ML/MIN/1.73M2
GLUCOSE BLD-MCNC: 114 MG/DL (ref 70–99)
GLUCOSE BLDC GLUCOMTR-MCNC: 92 MG/DL (ref 70–99)
GLUCOSE UR STRIP-MCNC: 150 MG/DL
HCG SERPL QL: NEGATIVE
HCT VFR BLD AUTO: 42.8 % (ref 35–47)
HGB BLD-MCNC: 14.1 G/DL (ref 11.7–15.7)
HGB UR QL STRIP: NEGATIVE
HOLD SPECIMEN: NORMAL
IMM GRANULOCYTES # BLD: 0.1 10E3/UL
IMM GRANULOCYTES NFR BLD: 1 %
KETONES UR STRIP-MCNC: 5 MG/DL
LEUKOCYTE ESTERASE UR QL STRIP: NEGATIVE
LYMPHOCYTES # BLD AUTO: 1.1 10E3/UL (ref 0.8–5.3)
LYMPHOCYTES NFR BLD AUTO: 10 %
MCH RBC QN AUTO: 29.6 PG (ref 26.5–33)
MCHC RBC AUTO-ENTMCNC: 32.9 G/DL (ref 31.5–36.5)
MCV RBC AUTO: 90 FL (ref 78–100)
MONOCYTES # BLD AUTO: 0.5 10E3/UL (ref 0–1.3)
MONOCYTES NFR BLD AUTO: 5 %
MUCOUS THREADS #/AREA URNS LPF: PRESENT /LPF
NEUTROPHILS # BLD AUTO: 9 10E3/UL (ref 1.6–8.3)
NEUTROPHILS NFR BLD AUTO: 84 %
NITRATE UR QL: NEGATIVE
NRBC # BLD AUTO: 0 10E3/UL
NRBC BLD AUTO-RTO: 0 /100
PH UR STRIP: 7 [PH] (ref 5–7)
PLATELET # BLD AUTO: 292 10E3/UL (ref 150–450)
POTASSIUM BLD-SCNC: 3.5 MMOL/L (ref 3.4–5.3)
PROT SERPL-MCNC: 8.2 G/DL (ref 6.8–8.8)
RBC # BLD AUTO: 4.77 10E6/UL (ref 3.8–5.2)
RBC URINE: 1 /HPF
SODIUM SERPL-SCNC: 136 MMOL/L (ref 133–144)
SP GR UR STRIP: 1.01 (ref 1–1.03)
SQUAMOUS EPITHELIAL: 6 /HPF
TROPONIN I SERPL-MCNC: <0.015 UG/L (ref 0–0.04)
UROBILINOGEN UR STRIP-MCNC: NORMAL MG/DL
WBC # BLD AUTO: 10.8 10E3/UL (ref 4–11)
WBC URINE: 1 /HPF

## 2021-07-17 PROCEDURE — 99285 EMERGENCY DEPT VISIT HI MDM: CPT | Performed by: EMERGENCY MEDICINE

## 2021-07-17 PROCEDURE — 81001 URINALYSIS AUTO W/SCOPE: CPT | Performed by: EMERGENCY MEDICINE

## 2021-07-17 PROCEDURE — 93005 ELECTROCARDIOGRAM TRACING: CPT | Performed by: EMERGENCY MEDICINE

## 2021-07-17 PROCEDURE — 85025 COMPLETE CBC W/AUTO DIFF WBC: CPT | Performed by: EMERGENCY MEDICINE

## 2021-07-17 PROCEDURE — 84703 CHORIONIC GONADOTROPIN ASSAY: CPT | Performed by: EMERGENCY MEDICINE

## 2021-07-17 PROCEDURE — 84484 ASSAY OF TROPONIN QUANT: CPT | Performed by: EMERGENCY MEDICINE

## 2021-07-17 PROCEDURE — 93005 ELECTROCARDIOGRAM TRACING: CPT | Mod: 76 | Performed by: EMERGENCY MEDICINE

## 2021-07-17 PROCEDURE — 93010 ELECTROCARDIOGRAM REPORT: CPT | Performed by: EMERGENCY MEDICINE

## 2021-07-17 PROCEDURE — 82040 ASSAY OF SERUM ALBUMIN: CPT | Performed by: EMERGENCY MEDICINE

## 2021-07-17 PROCEDURE — 99284 EMERGENCY DEPT VISIT MOD MDM: CPT | Mod: 25 | Performed by: EMERGENCY MEDICINE

## 2021-07-17 PROCEDURE — 36415 COLL VENOUS BLD VENIPUNCTURE: CPT | Performed by: EMERGENCY MEDICINE

## 2021-07-17 PROCEDURE — 36592 COLLECT BLOOD FROM PICC: CPT | Performed by: EMERGENCY MEDICINE

## 2021-07-17 ASSESSMENT — MIFFLIN-ST. JEOR: SCORE: 1435.76

## 2021-07-18 NOTE — ED NOTES
BG checked upon rooming (92). Pt states this is normal for her and reports good baseline glycemic control.

## 2021-07-18 NOTE — DISCHARGE INSTRUCTIONS
Return if symptoms worsen or new symptoms develop.  Follow-up with primary care physician next available.  Drink plenty of fluids.  Treat your sugars closely.  If any further hypoglycemic events or syncopal events please return for further evaluation and care.

## 2021-07-18 NOTE — ED TRIAGE NOTES
Episode of LOC. Pt states driving home from work at ~1730. Pulled into gas station and reported to have lost consciousness. Prior to this pt had fogginess making her think her BG was high. Unknown as to how long this episode was. States another couple at the gas station pulled her out of her vehicle, drenched in sweat. Max amount of time per significant other was 30 minutes.     Manager of [employer] station gave pt orange juice. Significant other then got phone call to pick pt up from the gas station. Pt has not checked BG today, normally wears dexcom. On insulin pump currently infusing. Currently no complaints except feeling chilled and tired.

## 2021-07-18 NOTE — ED PROVIDER NOTES
History     Chief Complaint   Patient presents with     Loss of Consciousness     HPI  Lauren Goodwin is a 30 year old female with a past medical history significant for diabetes type 1 who presents the emergency department with episode of altered mental status.  She states she was in her normal state of health and was driving home from work at about 530 this evening when she pulled into a gas station and started to feel funny.  She was also walking by some people who had seen her slumped in her car and noted she was drenched in sweat.  Not sure how long she could have been out could have been seconds to up to 30 minutes.  She was given orange juice.  She did not monitor blood glucose today normally wears a Dexcom but did not have it on her today.  Is currently on an insulin pump which is working correctly.  She is feeling a bit chilled and tired but otherwise denies any other symptoms at this time.  She is not having any headache or visual changes denies any throat pain or neck pain.  She is not any chest pain or shortness of breath denies any abdominal pain.  She denies nausea vomiting diarrhea she does not have any urinary symptoms.  She denies any bowel or bladder dysfunction is not any rash..  She denies any recent drugs or alcohol use.  No new medication changes.  Has not had an illness recently.  Currently is feeling much better.    Allergies:  No Known Allergies    Problem List:    Patient Active Problem List    Diagnosis Date Noted     Pilonidal sinus with abscess      Priority: Medium     Pre-op exam 11/19/2018     Priority: Medium     Type 1 diabetes mellitus without complication (H) 03/01/2018     Priority: Medium     Pilonidal cyst      Priority: Medium        Past Medical History:    History reviewed. No pertinent past medical history.    Past Surgical History:    Past Surgical History:   Procedure Laterality Date     MOUTH SURGERY       NO PAST SURGERIES       PILONIDAL CYST / SINUS EXCISION N/A  "11/20/2018    Procedure: PILONIDAL TRACTOTECTOMY;  Surgeon: Adriel Banuelos MD;  Location: [facility];  Service: General     WISDOM TOOTH EXTRACTION         Family History:    Family History   Problem Relation Age of Onset     Thyroid Disease Mother      Crohn's Disease Sister      No Known Problems Brother      Cerebrovascular Disease Maternal Grandfather      No Known Problems Father      No Known Problems Sister        Social History:  Marital Status:   [2]  Social History     Tobacco Use     Smoking status: Never Smoker     Smokeless tobacco: Never Used   Substance Use Topics     Alcohol use: Yes     Comment: Alcoholic Drinks/day: occasionally     Drug use: No        Medications:    blood glucose test (CONTOUR NEXT TEST STRIPS) strips  insulin lispro (ADMELOG SOLOSTAR U-100 INSULIN) 100 unit/mL pen  norethindrone-ethinyl estradiol (MICROGESTIN 1/20) 1-20 mg-mcg per tablet  NOVOLOG U-100 INSULIN ASPART 100 unit/mL injection          Review of Systems  All systems reviewed and other than pertinent positives negatives in HPI all other systems are negative.  Physical Exam   BP: 137/83  Pulse: 83  Temp: 98.3  F (36.8  C)  Resp: 16  Height: 162.6 cm (5' 4\")  Weight: 72.6 kg (160 lb)  SpO2: 99 %      Physical Exam  Vitals and nursing note reviewed.   Constitutional:       General: She is not in acute distress.     Appearance: Normal appearance. She is not ill-appearing, toxic-appearing or diaphoretic.   HENT:      Head: Normocephalic and atraumatic.      Nose: Nose normal. No congestion.      Mouth/Throat:      Mouth: Mucous membranes are moist.      Pharynx: Oropharynx is clear.   Eyes:      Conjunctiva/sclera: Conjunctivae normal.      Pupils: Pupils are equal, round, and reactive to light.   Neck:      Vascular: No carotid bruit.   Cardiovascular:      Rate and Rhythm: Normal rate and regular rhythm.      Pulses: Normal pulses.      Heart sounds: Normal heart sounds. No murmur heard.     Pulmonary:     "  Effort: Pulmonary effort is normal.      Breath sounds: Normal breath sounds. No wheezing, rhonchi or rales.   Chest:      Chest wall: No tenderness.   Abdominal:      General: Abdomen is flat. Bowel sounds are normal. There is no distension.      Palpations: Abdomen is soft.      Tenderness: There is no abdominal tenderness. There is no right CVA tenderness or left CVA tenderness.   Musculoskeletal:         General: No swelling or tenderness. Normal range of motion.      Cervical back: Neck supple. No rigidity or tenderness.      Right lower leg: No edema.      Left lower leg: No edema.   Lymphadenopathy:      Cervical: No cervical adenopathy.   Skin:     General: Skin is warm and dry.      Capillary Refill: Capillary refill takes less than 2 seconds.      Findings: No rash.   Neurological:      General: No focal deficit present.      Mental Status: She is alert and oriented to person, place, and time.      Motor: No weakness.      Coordination: Coordination normal.   Psychiatric:         Mood and Affect: Mood normal.         ED Course        Procedures              EKG Interpretation:      Interpreted by Mil Pinto MD  Rhythm: normal sinus   Rate: Normal  Axis: Normal  Ectopy: none  Conduction: P wave inversion in almost all leads otherwise unremarkable  ST Segments/ T Waves: Non-specific ST-T wave changes  Q Waves: none  Comparison to prior: No old EKG available    Clinical Impression: Normal sinus rhythm with P wave inversion.      Critical Care time:  none               Results for orders placed or performed during the hospital encounter of 07/17/21 (from the past 24 hour(s))   Glucose by meter   Result Value Ref Range    GLUCOSE BY METER POCT 92 70 - 99 mg/dL   [place] [facility]    Narrative    The following orders were created for panel order [place] [facility].  Procedure                               Abnormality         Status                     ---------                               -----------          ------                     Extra Red Top Tube[[id number]]                                                          Extra Green Top (Lithium...[[id number]]                                                 Extra Green Top (Lithium...[[id number]]                                                 Extra Purple Top Tube[[id number]]                                                         Please view results for these tests on the individual orders.       Medications - No data to display    Assessments & Plan (with Medical Decision Making) records were reviewed.  Labs were obtained.  Patient was given oral fluids.  EKG had inverted P waves but otherwise unremarkable no old EKG for comparison.  CBC without significant abnormality.  Comprehensive metabolic panel without significant abnormality.  Pregnancy test was negative.  UA with 5 ketones 150 glucose.  Patient was observed for some time on a cardiac monitor and had no arrhythmias.  She feels fine at this point.  I suspect this was a hypoglycemic event but this cannot be totally confirmed.  She should monitor her sugars closely and follow-up with her primary care physician for further assessment of the syncopal event.  I discussed her EKG finding with her and close follow-up with primary care if any dizziness headache chest pain shortness of breath or other symptoms present she should return for recheck.  She feels comfortable with this plan at this point.     I have reviewed the nursing notes.    I have reviewed the findings, diagnosis, plan and need for follow up with the patient.       New Prescriptions    No medications on file       Final diagnoses:   Syncope, unspecified syncope type   Hypoglycemia - probable       7/17/2021   [facility] EMERGENCY DEPT     Mil Pinto MD  07/19/21 [id number]

## 2021-07-18 NOTE — ED NOTES
Pt is type 1 diabetic, pt reports she remembers pulling into the gas station and up to the pump. The next thing the pt remembers is a couple pulling her out of her car and drench in sweat. Pt unsure of how long she was out of but could have been anywhere up to 30-45 minutes. Pt was escorted into the gas station and was able to drink some orange juice.     Pt is alert and orientated at this time. BG at bedside 92.

## 2021-10-16 ENCOUNTER — HEALTH MAINTENANCE LETTER (OUTPATIENT)
Age: 31
End: 2021-10-16

## 2022-02-05 ENCOUNTER — HEALTH MAINTENANCE LETTER (OUTPATIENT)
Age: 32
End: 2022-02-05

## 2022-07-23 ENCOUNTER — HEALTH MAINTENANCE LETTER (OUTPATIENT)
Age: 32
End: 2022-07-23

## 2022-10-01 ENCOUNTER — HEALTH MAINTENANCE LETTER (OUTPATIENT)
Age: 32
End: 2022-10-01

## 2023-05-14 ENCOUNTER — HEALTH MAINTENANCE LETTER (OUTPATIENT)
Age: 33
End: 2023-05-14

## 2023-08-06 ENCOUNTER — HEALTH MAINTENANCE LETTER (OUTPATIENT)
Age: 33
End: 2023-08-06

## 2023-10-02 ENCOUNTER — HOSPITAL ENCOUNTER (INPATIENT)
Facility: CLINIC | Age: 33
LOS: 3 days | Discharge: HOME | End: 2023-10-06
Attending: OBSTETRICS & GYNECOLOGY | Admitting: OBSTETRICS & GYNECOLOGY
Payer: COMMERCIAL

## 2023-10-02 DIAGNOSIS — Z98.891 S/P CESAREAN SECTION: Primary | ICD-10-CM

## 2023-10-02 DIAGNOSIS — O14.10 SEVERE PRE-ECLAMPSIA, ANTEPARTUM: ICD-10-CM

## 2023-10-02 LAB
ALBUMIN SERPL BCG-MCNC: 3 G/DL (ref 3.5–5.2)
ALP SERPL-CCNC: 173 U/L (ref 35–104)
ALT SERPL W P-5'-P-CCNC: 10 U/L (ref 0–50)
ANION GAP SERPL CALCULATED.3IONS-SCNC: 10 MMOL/L (ref 7–15)
AST SERPL W P-5'-P-CCNC: 25 U/L (ref 0–45)
BILIRUB SERPL-MCNC: 0.3 MG/DL
BUN SERPL-MCNC: 16.3 MG/DL (ref 6–20)
CALCIUM SERPL-MCNC: 9 MG/DL (ref 8.6–10)
CHLORIDE SERPL-SCNC: 106 MMOL/L (ref 98–107)
CREAT SERPL-MCNC: 0.82 MG/DL (ref 0.51–0.95)
DEPRECATED HCO3 PLAS-SCNC: 21 MMOL/L (ref 22–29)
EGFRCR SERPLBLD CKD-EPI 2021: >90 ML/MIN/1.73M2
ERYTHROCYTE [DISTWIDTH] IN BLOOD BY AUTOMATED COUNT: 13.2 % (ref 10–15)
GLUCOSE SERPL-MCNC: 104 MG/DL (ref 70–99)
HCT VFR BLD AUTO: 34.6 % (ref 35–47)
HGB BLD-MCNC: 11.9 G/DL (ref 11.7–15.7)
MCH RBC QN AUTO: 29.5 PG (ref 26.5–33)
MCHC RBC AUTO-ENTMCNC: 34.4 G/DL (ref 31.5–36.5)
MCV RBC AUTO: 86 FL (ref 78–100)
PLATELET # BLD AUTO: 190 10E3/UL (ref 150–450)
POTASSIUM SERPL-SCNC: 4 MMOL/L (ref 3.4–5.3)
PROT SERPL-MCNC: 6 G/DL (ref 6.4–8.3)
RBC # BLD AUTO: 4.03 10E6/UL (ref 3.8–5.2)
SODIUM SERPL-SCNC: 137 MMOL/L (ref 135–145)
WBC # BLD AUTO: 10.1 10E3/UL (ref 4–11)

## 2023-10-02 PROCEDURE — 250N000011 HC RX IP 250 OP 636: Mod: JZ

## 2023-10-02 PROCEDURE — G0463 HOSPITAL OUTPT CLINIC VISIT: HCPCS

## 2023-10-02 PROCEDURE — 80053 COMPREHEN METABOLIC PANEL: CPT

## 2023-10-02 PROCEDURE — 36415 COLL VENOUS BLD VENIPUNCTURE: CPT

## 2023-10-02 PROCEDURE — 85027 COMPLETE CBC AUTOMATED: CPT

## 2023-10-02 RX ORDER — MAGNESIUM SULFATE HEPTAHYDRATE 40 MG/ML
2 INJECTION, SOLUTION INTRAVENOUS
Status: DISCONTINUED | OUTPATIENT
Start: 2023-10-02 | End: 2023-10-05

## 2023-10-02 RX ORDER — LIDOCAINE 40 MG/G
CREAM TOPICAL
Status: DISCONTINUED | OUTPATIENT
Start: 2023-10-02 | End: 2023-10-06 | Stop reason: HOSPADM

## 2023-10-02 RX ORDER — SODIUM CHLORIDE, SODIUM LACTATE, POTASSIUM CHLORIDE, CALCIUM CHLORIDE 600; 310; 30; 20 MG/100ML; MG/100ML; MG/100ML; MG/100ML
10-125 INJECTION, SOLUTION INTRAVENOUS CONTINUOUS
Status: DISCONTINUED | OUTPATIENT
Start: 2023-10-02 | End: 2023-10-06 | Stop reason: HOSPADM

## 2023-10-02 RX ORDER — LEVOTHYROXINE SODIUM 50 UG/1
50 TABLET ORAL DAILY
COMMUNITY

## 2023-10-02 RX ORDER — CALCIUM GLUCONATE 94 MG/ML
1 INJECTION, SOLUTION INTRAVENOUS
Status: DISCONTINUED | OUTPATIENT
Start: 2023-10-02 | End: 2023-10-04

## 2023-10-02 RX ORDER — NIFEDIPINE 10 MG/1
10-20 CAPSULE ORAL
Status: DISCONTINUED | OUTPATIENT
Start: 2023-10-02 | End: 2023-10-06 | Stop reason: HOSPADM

## 2023-10-02 RX ORDER — MAGNESIUM SULFATE HEPTAHYDRATE 40 MG/ML
4 INJECTION, SOLUTION INTRAVENOUS
Status: DISCONTINUED | OUTPATIENT
Start: 2023-10-02 | End: 2023-10-05

## 2023-10-02 RX ORDER — ASPIRIN 81 MG/1
162 TABLET, CHEWABLE ORAL DAILY
Status: ON HOLD | COMMUNITY
End: 2023-10-05

## 2023-10-02 RX ORDER — LABETALOL HYDROCHLORIDE 5 MG/ML
20 INJECTION, SOLUTION INTRAVENOUS
Status: DISCONTINUED | OUTPATIENT
Start: 2023-10-02 | End: 2023-10-06 | Stop reason: HOSPADM

## 2023-10-02 RX ADMIN — LABETALOL HYDROCHLORIDE 20 MG: 5 INJECTION, SOLUTION INTRAVENOUS at 22:44

## 2023-10-02 ASSESSMENT — ACTIVITIES OF DAILY LIVING (ADL): ADLS_ACUITY_SCORE: 20

## 2023-10-03 ENCOUNTER — ANESTHESIA (OUTPATIENT)
Dept: OBGYN | Facility: CLINIC | Age: 33
End: 2023-10-03
Payer: COMMERCIAL

## 2023-10-03 ENCOUNTER — ANESTHESIA EVENT (OUTPATIENT)
Dept: OBGYN | Facility: CLINIC | Age: 33
End: 2023-10-03
Payer: COMMERCIAL

## 2023-10-03 PROBLEM — O14.10 SEVERE PREECLAMPSIA: Status: ACTIVE | Noted: 2023-10-03

## 2023-10-03 LAB
ABO/RH(D): NORMAL
ALT SERPL W P-5'-P-CCNC: 11 U/L (ref 0–50)
ALT SERPL W P-5'-P-CCNC: 9 U/L (ref 0–50)
ANTIBODY SCREEN: NEGATIVE
AST SERPL W P-5'-P-CCNC: 27 U/L (ref 0–45)
AST SERPL W P-5'-P-CCNC: 31 U/L (ref 0–45)
CREAT SERPL-MCNC: 0.78 MG/DL (ref 0.51–0.95)
CREAT SERPL-MCNC: 0.99 MG/DL (ref 0.51–0.95)
EGFRCR SERPLBLD CKD-EPI 2021: 77 ML/MIN/1.73M2
EGFRCR SERPLBLD CKD-EPI 2021: >90 ML/MIN/1.73M2
GLUCOSE BLDC GLUCOMTR-MCNC: 100 MG/DL (ref 70–99)
GLUCOSE BLDC GLUCOMTR-MCNC: 103 MG/DL (ref 70–99)
GLUCOSE BLDC GLUCOMTR-MCNC: 107 MG/DL (ref 70–99)
GLUCOSE BLDC GLUCOMTR-MCNC: 108 MG/DL (ref 70–99)
GLUCOSE BLDC GLUCOMTR-MCNC: 111 MG/DL (ref 70–99)
GLUCOSE BLDC GLUCOMTR-MCNC: 119 MG/DL (ref 70–99)
GLUCOSE BLDC GLUCOMTR-MCNC: 121 MG/DL (ref 70–99)
GLUCOSE BLDC GLUCOMTR-MCNC: 125 MG/DL (ref 70–99)
GLUCOSE BLDC GLUCOMTR-MCNC: 127 MG/DL (ref 70–99)
GLUCOSE BLDC GLUCOMTR-MCNC: 129 MG/DL (ref 70–99)
GLUCOSE BLDC GLUCOMTR-MCNC: 130 MG/DL (ref 70–99)
GLUCOSE BLDC GLUCOMTR-MCNC: 133 MG/DL (ref 70–99)
GLUCOSE BLDC GLUCOMTR-MCNC: 77 MG/DL (ref 70–99)
GLUCOSE BLDC GLUCOMTR-MCNC: 80 MG/DL (ref 70–99)
GLUCOSE BLDC GLUCOMTR-MCNC: 89 MG/DL (ref 70–99)
HBA1C MFR BLD: 5.1 %
HGB BLD-MCNC: 12 G/DL (ref 11.7–15.7)
HGB BLD-MCNC: 13.4 G/DL (ref 11.7–15.7)
PLATELET # BLD AUTO: 154 10E3/UL (ref 150–450)
PLATELET # BLD AUTO: 204 10E3/UL (ref 150–450)
SARS-COV-2 RNA RESP QL NAA+PROBE: NEGATIVE
SPECIMEN EXPIRATION DATE: NORMAL
T PALLIDUM AB SER QL: NONREACTIVE

## 2023-10-03 PROCEDURE — 84450 TRANSFERASE (AST) (SGOT): CPT

## 2023-10-03 PROCEDURE — 86901 BLOOD TYPING SEROLOGIC RH(D): CPT

## 2023-10-03 PROCEDURE — 84460 ALANINE AMINO (ALT) (SGPT): CPT

## 2023-10-03 PROCEDURE — 10907ZC DRAINAGE OF AMNIOTIC FLUID, THERAPEUTIC FROM PRODUCTS OF CONCEPTION, VIA NATURAL OR ARTIFICIAL OPENING: ICD-10-PCS | Performed by: OBSTETRICS & GYNECOLOGY

## 2023-10-03 PROCEDURE — 36415 COLL VENOUS BLD VENIPUNCTURE: CPT

## 2023-10-03 PROCEDURE — 250N000009 HC RX 250

## 2023-10-03 PROCEDURE — 250N000011 HC RX IP 250 OP 636: Performed by: STUDENT IN AN ORGANIZED HEALTH CARE EDUCATION/TRAINING PROGRAM

## 2023-10-03 PROCEDURE — 258N000003 HC RX IP 258 OP 636

## 2023-10-03 PROCEDURE — 85049 AUTOMATED PLATELET COUNT: CPT

## 2023-10-03 PROCEDURE — 250N000013 HC RX MED GY IP 250 OP 250 PS 637

## 2023-10-03 PROCEDURE — 86780 TREPONEMA PALLIDUM: CPT

## 2023-10-03 PROCEDURE — 99223 1ST HOSP IP/OBS HIGH 75: CPT

## 2023-10-03 PROCEDURE — 120N000002 HC R&B MED SURG/OB UMMC

## 2023-10-03 PROCEDURE — 250N000011 HC RX IP 250 OP 636: Mod: JZ

## 2023-10-03 PROCEDURE — 99418 PROLNG IP/OBS E/M EA 15 MIN: CPT

## 2023-10-03 PROCEDURE — 83735 ASSAY OF MAGNESIUM: CPT

## 2023-10-03 PROCEDURE — 82010 KETONE BODYS QUAN: CPT

## 2023-10-03 PROCEDURE — 00HU33Z INSERTION OF INFUSION DEVICE INTO SPINAL CANAL, PERCUTANEOUS APPROACH: ICD-10-PCS | Performed by: ANESTHESIOLOGY

## 2023-10-03 PROCEDURE — 82565 ASSAY OF CREATININE: CPT

## 2023-10-03 PROCEDURE — 83036 HEMOGLOBIN GLYCOSYLATED A1C: CPT

## 2023-10-03 PROCEDURE — 3E0R3BZ INTRODUCTION OF ANESTHETIC AGENT INTO SPINAL CANAL, PERCUTANEOUS APPROACH: ICD-10-PCS | Performed by: ANESTHESIOLOGY

## 2023-10-03 PROCEDURE — 999N000127 HC STATISTIC PERIPHERAL IV START W US GUIDANCE

## 2023-10-03 PROCEDURE — 85018 HEMOGLOBIN: CPT

## 2023-10-03 PROCEDURE — 250N000011 HC RX IP 250 OP 636

## 2023-10-03 PROCEDURE — C9803 HOPD COVID-19 SPEC COLLECT: HCPCS

## 2023-10-03 PROCEDURE — 87635 SARS-COV-2 COVID-19 AMP PRB: CPT

## 2023-10-03 RX ORDER — MAGNESIUM SULFATE HEPTAHYDRATE 40 MG/ML
4 INJECTION, SOLUTION INTRAVENOUS
Status: DISCONTINUED | OUTPATIENT
Start: 2023-10-03 | End: 2023-10-05

## 2023-10-03 RX ORDER — IBUPROFEN 800 MG/1
800 TABLET, FILM COATED ORAL
Status: DISCONTINUED | OUTPATIENT
Start: 2023-10-03 | End: 2023-10-04

## 2023-10-03 RX ORDER — LIDOCAINE 40 MG/G
CREAM TOPICAL
Status: DISCONTINUED | OUTPATIENT
Start: 2023-10-03 | End: 2023-10-04 | Stop reason: HOSPADM

## 2023-10-03 RX ORDER — OXYTOCIN 10 [USP'U]/ML
10 INJECTION, SOLUTION INTRAMUSCULAR; INTRAVENOUS
Status: DISCONTINUED | OUTPATIENT
Start: 2023-10-03 | End: 2023-10-04

## 2023-10-03 RX ORDER — OXYTOCIN/0.9 % SODIUM CHLORIDE 30/500 ML
100-340 PLASTIC BAG, INJECTION (ML) INTRAVENOUS CONTINUOUS PRN
Status: DISCONTINUED | OUTPATIENT
Start: 2023-10-03 | End: 2023-10-04

## 2023-10-03 RX ORDER — MAGNESIUM SULFATE HEPTAHYDRATE 40 MG/ML
4 INJECTION, SOLUTION INTRAVENOUS ONCE
Status: COMPLETED | OUTPATIENT
Start: 2023-10-03 | End: 2023-10-03

## 2023-10-03 RX ORDER — KETOROLAC TROMETHAMINE 30 MG/ML
30 INJECTION, SOLUTION INTRAMUSCULAR; INTRAVENOUS
Status: DISCONTINUED | OUTPATIENT
Start: 2023-10-03 | End: 2023-10-04

## 2023-10-03 RX ORDER — MISOPROSTOL 200 UG/1
800 TABLET ORAL
Status: DISCONTINUED | OUTPATIENT
Start: 2023-10-03 | End: 2023-10-04 | Stop reason: HOSPADM

## 2023-10-03 RX ORDER — SODIUM CHLORIDE, SODIUM LACTATE, POTASSIUM CHLORIDE, CALCIUM CHLORIDE 600; 310; 30; 20 MG/100ML; MG/100ML; MG/100ML; MG/100ML
INJECTION, SOLUTION INTRAVENOUS CONTINUOUS PRN
Status: DISCONTINUED | OUTPATIENT
Start: 2023-10-03 | End: 2023-10-04 | Stop reason: HOSPADM

## 2023-10-03 RX ORDER — LEVOTHYROXINE SODIUM 50 UG/1
50 TABLET ORAL DAILY
Status: DISCONTINUED | OUTPATIENT
Start: 2023-10-03 | End: 2023-10-06 | Stop reason: HOSPADM

## 2023-10-03 RX ORDER — TRANEXAMIC ACID 10 MG/ML
1 INJECTION, SOLUTION INTRAVENOUS EVERY 30 MIN PRN
Status: DISCONTINUED | OUTPATIENT
Start: 2023-10-03 | End: 2023-10-04 | Stop reason: HOSPADM

## 2023-10-03 RX ORDER — NALOXONE HYDROCHLORIDE 0.4 MG/ML
0.2 INJECTION, SOLUTION INTRAMUSCULAR; INTRAVENOUS; SUBCUTANEOUS
Status: DISCONTINUED | OUTPATIENT
Start: 2023-10-03 | End: 2023-10-04 | Stop reason: HOSPADM

## 2023-10-03 RX ORDER — MAGNESIUM SULFATE IN WATER 40 MG/ML
2 INJECTION, SOLUTION INTRAVENOUS CONTINUOUS
Status: DISCONTINUED | OUTPATIENT
Start: 2023-10-03 | End: 2023-10-06 | Stop reason: HOSPADM

## 2023-10-03 RX ORDER — MAGNESIUM SULFATE HEPTAHYDRATE 40 MG/ML
2 INJECTION, SOLUTION INTRAVENOUS
Status: DISCONTINUED | OUTPATIENT
Start: 2023-10-03 | End: 2023-10-05

## 2023-10-03 RX ORDER — CARBOPROST TROMETHAMINE 250 UG/ML
250 INJECTION, SOLUTION INTRAMUSCULAR
Status: DISCONTINUED | OUTPATIENT
Start: 2023-10-03 | End: 2023-10-04 | Stop reason: HOSPADM

## 2023-10-03 RX ORDER — FENTANYL CITRATE 50 UG/ML
50 INJECTION, SOLUTION INTRAMUSCULAR; INTRAVENOUS EVERY 30 MIN PRN
Status: DISCONTINUED | OUTPATIENT
Start: 2023-10-03 | End: 2023-10-04 | Stop reason: HOSPADM

## 2023-10-03 RX ORDER — OXYTOCIN/0.9 % SODIUM CHLORIDE 30/500 ML
1-24 PLASTIC BAG, INJECTION (ML) INTRAVENOUS CONTINUOUS
Status: DISCONTINUED | OUTPATIENT
Start: 2023-10-03 | End: 2023-10-04 | Stop reason: HOSPADM

## 2023-10-03 RX ORDER — CITRIC ACID/SODIUM CITRATE 334-500MG
30 SOLUTION, ORAL ORAL ONCE
Status: DISCONTINUED | OUTPATIENT
Start: 2023-10-03 | End: 2023-10-04 | Stop reason: HOSPADM

## 2023-10-03 RX ORDER — OXYTOCIN/0.9 % SODIUM CHLORIDE 30/500 ML
340 PLASTIC BAG, INJECTION (ML) INTRAVENOUS CONTINUOUS PRN
Status: DISCONTINUED | OUTPATIENT
Start: 2023-10-03 | End: 2023-10-04 | Stop reason: HOSPADM

## 2023-10-03 RX ORDER — SODIUM CHLORIDE 9 MG/ML
INJECTION, SOLUTION INTRAVENOUS CONTINUOUS
Status: DISCONTINUED | OUTPATIENT
Start: 2023-10-03 | End: 2023-10-04 | Stop reason: HOSPADM

## 2023-10-03 RX ORDER — PROCHLORPERAZINE 25 MG
25 SUPPOSITORY, RECTAL RECTAL EVERY 12 HOURS PRN
Status: DISCONTINUED | OUTPATIENT
Start: 2023-10-03 | End: 2023-10-04 | Stop reason: HOSPADM

## 2023-10-03 RX ORDER — ACETAMINOPHEN 325 MG/1
650 TABLET ORAL EVERY 4 HOURS PRN
Status: DISCONTINUED | OUTPATIENT
Start: 2023-10-03 | End: 2023-10-04 | Stop reason: HOSPADM

## 2023-10-03 RX ORDER — SODIUM CHLORIDE, SODIUM LACTATE, POTASSIUM CHLORIDE, CALCIUM CHLORIDE 600; 310; 30; 20 MG/100ML; MG/100ML; MG/100ML; MG/100ML
10-125 INJECTION, SOLUTION INTRAVENOUS CONTINUOUS
Status: DISCONTINUED | OUTPATIENT
Start: 2023-10-03 | End: 2023-10-06 | Stop reason: HOSPADM

## 2023-10-03 RX ORDER — NICOTINE POLACRILEX 4 MG
15-30 LOZENGE BUCCAL
Status: DISCONTINUED | OUTPATIENT
Start: 2023-10-03 | End: 2023-10-06 | Stop reason: HOSPADM

## 2023-10-03 RX ORDER — FENTANYL/ROPIVACAINE/NS/PF 2MCG/ML-.1
PLASTIC BAG, INJECTION (ML) EPIDURAL
Status: DISCONTINUED | OUTPATIENT
Start: 2023-10-03 | End: 2023-10-04 | Stop reason: HOSPADM

## 2023-10-03 RX ORDER — OXYTOCIN 10 [USP'U]/ML
10 INJECTION, SOLUTION INTRAMUSCULAR; INTRAVENOUS
Status: DISCONTINUED | OUTPATIENT
Start: 2023-10-03 | End: 2023-10-04 | Stop reason: HOSPADM

## 2023-10-03 RX ORDER — CITRIC ACID/SODIUM CITRATE 334-500MG
30 SOLUTION, ORAL ORAL
Status: DISCONTINUED | OUTPATIENT
Start: 2023-10-03 | End: 2023-10-04 | Stop reason: HOSPADM

## 2023-10-03 RX ORDER — TERBUTALINE SULFATE 1 MG/ML
0.25 INJECTION, SOLUTION SUBCUTANEOUS
Status: DISCONTINUED | OUTPATIENT
Start: 2023-10-03 | End: 2023-10-04 | Stop reason: HOSPADM

## 2023-10-03 RX ORDER — ONDANSETRON 2 MG/ML
4 INJECTION INTRAMUSCULAR; INTRAVENOUS EVERY 6 HOURS PRN
Status: DISCONTINUED | OUTPATIENT
Start: 2023-10-03 | End: 2023-10-04 | Stop reason: HOSPADM

## 2023-10-03 RX ORDER — MORPHINE SULFATE 10 MG/ML
10 INJECTION, SOLUTION INTRAMUSCULAR; INTRAVENOUS
Status: DISCONTINUED | OUTPATIENT
Start: 2023-10-03 | End: 2023-10-04 | Stop reason: HOSPADM

## 2023-10-03 RX ORDER — NALBUPHINE HYDROCHLORIDE 20 MG/ML
2.5-5 INJECTION, SOLUTION INTRAMUSCULAR; INTRAVENOUS; SUBCUTANEOUS EVERY 6 HOURS PRN
Status: DISCONTINUED | OUTPATIENT
Start: 2023-10-03 | End: 2023-10-04

## 2023-10-03 RX ORDER — HYDRALAZINE HYDROCHLORIDE 20 MG/ML
10 INJECTION INTRAMUSCULAR; INTRAVENOUS
Status: DISCONTINUED | OUTPATIENT
Start: 2023-10-03 | End: 2023-10-06 | Stop reason: HOSPADM

## 2023-10-03 RX ORDER — ONDANSETRON 4 MG/1
4 TABLET, ORALLY DISINTEGRATING ORAL EVERY 6 HOURS PRN
Status: DISCONTINUED | OUTPATIENT
Start: 2023-10-03 | End: 2023-10-04 | Stop reason: HOSPADM

## 2023-10-03 RX ORDER — CALCIUM GLUCONATE 94 MG/ML
1 INJECTION, SOLUTION INTRAVENOUS
Status: DISCONTINUED | OUTPATIENT
Start: 2023-10-03 | End: 2023-10-06 | Stop reason: HOSPADM

## 2023-10-03 RX ORDER — METHYLERGONOVINE MALEATE 0.2 MG/ML
200 INJECTION INTRAVENOUS
Status: DISCONTINUED | OUTPATIENT
Start: 2023-10-03 | End: 2023-10-04 | Stop reason: HOSPADM

## 2023-10-03 RX ORDER — METOCLOPRAMIDE HYDROCHLORIDE 5 MG/ML
10 INJECTION INTRAMUSCULAR; INTRAVENOUS EVERY 6 HOURS PRN
Status: DISCONTINUED | OUTPATIENT
Start: 2023-10-03 | End: 2023-10-04 | Stop reason: HOSPADM

## 2023-10-03 RX ORDER — PROCHLORPERAZINE MALEATE 10 MG
10 TABLET ORAL EVERY 6 HOURS PRN
Status: DISCONTINUED | OUTPATIENT
Start: 2023-10-03 | End: 2023-10-04 | Stop reason: HOSPADM

## 2023-10-03 RX ORDER — DEXTROSE, SODIUM CHLORIDE, SODIUM LACTATE, POTASSIUM CHLORIDE, AND CALCIUM CHLORIDE 5; .6; .31; .03; .02 G/100ML; G/100ML; G/100ML; G/100ML; G/100ML
INJECTION, SOLUTION INTRAVENOUS CONTINUOUS
Status: DISCONTINUED | OUTPATIENT
Start: 2023-10-03 | End: 2023-10-04 | Stop reason: HOSPADM

## 2023-10-03 RX ORDER — HYDROXYZINE HYDROCHLORIDE 50 MG/1
50 TABLET, FILM COATED ORAL
Status: DISCONTINUED | OUTPATIENT
Start: 2023-10-03 | End: 2023-10-04 | Stop reason: HOSPADM

## 2023-10-03 RX ORDER — FENTANYL CITRATE-0.9 % NACL/PF 10 MCG/ML
100 PLASTIC BAG, INJECTION (ML) INTRAVENOUS EVERY 5 MIN PRN
Status: DISCONTINUED | OUTPATIENT
Start: 2023-10-03 | End: 2023-10-04 | Stop reason: HOSPADM

## 2023-10-03 RX ORDER — NALOXONE HYDROCHLORIDE 0.4 MG/ML
0.4 INJECTION, SOLUTION INTRAMUSCULAR; INTRAVENOUS; SUBCUTANEOUS
Status: DISCONTINUED | OUTPATIENT
Start: 2023-10-03 | End: 2023-10-04 | Stop reason: HOSPADM

## 2023-10-03 RX ORDER — MISOPROSTOL 100 UG/1
25 TABLET ORAL EVERY 4 HOURS PRN
Status: DISCONTINUED | OUTPATIENT
Start: 2023-10-03 | End: 2023-10-03

## 2023-10-03 RX ORDER — LABETALOL HYDROCHLORIDE 5 MG/ML
20-80 INJECTION, SOLUTION INTRAVENOUS EVERY 10 MIN PRN
Status: DISCONTINUED | OUTPATIENT
Start: 2023-10-03 | End: 2023-10-06 | Stop reason: HOSPADM

## 2023-10-03 RX ORDER — SODIUM CHLORIDE 9 MG/ML
INJECTION, SOLUTION INTRAVENOUS
Status: COMPLETED
Start: 2023-10-03 | End: 2023-10-03

## 2023-10-03 RX ORDER — METOCLOPRAMIDE 10 MG/1
10 TABLET ORAL EVERY 6 HOURS PRN
Status: DISCONTINUED | OUTPATIENT
Start: 2023-10-03 | End: 2023-10-04 | Stop reason: HOSPADM

## 2023-10-03 RX ORDER — DEXTROSE MONOHYDRATE 25 G/50ML
25-50 INJECTION, SOLUTION INTRAVENOUS
Status: DISCONTINUED | OUTPATIENT
Start: 2023-10-03 | End: 2023-10-06 | Stop reason: HOSPADM

## 2023-10-03 RX ORDER — MISOPROSTOL 200 UG/1
400 TABLET ORAL
Status: DISCONTINUED | OUTPATIENT
Start: 2023-10-03 | End: 2023-10-04 | Stop reason: HOSPADM

## 2023-10-03 RX ORDER — LIDOCAINE 40 MG/G
CREAM TOPICAL
Status: DISCONTINUED | OUTPATIENT
Start: 2023-10-03 | End: 2023-10-06 | Stop reason: HOSPADM

## 2023-10-03 RX ORDER — MISOPROSTOL 100 UG/1
25 TABLET ORAL
Status: DISCONTINUED | OUTPATIENT
Start: 2023-10-03 | End: 2023-10-04 | Stop reason: HOSPADM

## 2023-10-03 RX ADMIN — MAGNESIUM SULFATE HEPTAHYDRATE 2 G/HR: 40 INJECTION, SOLUTION INTRAVENOUS at 02:41

## 2023-10-03 RX ADMIN — SODIUM CHLORIDE: 9 INJECTION, SOLUTION INTRAVENOUS at 23:30

## 2023-10-03 RX ADMIN — MAGNESIUM SULFATE HEPTAHYDRATE 4 G: 40 INJECTION, SOLUTION INTRAVENOUS at 02:07

## 2023-10-03 RX ADMIN — SODIUM CHLORIDE, POTASSIUM CHLORIDE, SODIUM LACTATE AND CALCIUM CHLORIDE 75 ML/HR: 600; 310; 30; 20 INJECTION, SOLUTION INTRAVENOUS at 02:04

## 2023-10-03 RX ADMIN — MISOPROSTOL 25 MCG: 100 TABLET ORAL at 01:49

## 2023-10-03 RX ADMIN — MISOPROSTOL 25 MCG: 100 TABLET ORAL at 04:06

## 2023-10-03 RX ADMIN — PROCHLORPERAZINE EDISYLATE 10 MG: 5 INJECTION INTRAMUSCULAR; INTRAVENOUS at 15:56

## 2023-10-03 RX ADMIN — SODIUM CHLORIDE, POTASSIUM CHLORIDE, SODIUM LACTATE AND CALCIUM CHLORIDE: 600; 310; 30; 20 INJECTION, SOLUTION INTRAVENOUS at 23:20

## 2023-10-03 RX ADMIN — LABETALOL HYDROCHLORIDE 20 MG: 5 INJECTION, SOLUTION INTRAVENOUS at 15:43

## 2023-10-03 RX ADMIN — ONDANSETRON 4 MG: 2 INJECTION INTRAMUSCULAR; INTRAVENOUS at 14:38

## 2023-10-03 RX ADMIN — LABETALOL HYDROCHLORIDE 20 MG: 5 INJECTION, SOLUTION INTRAVENOUS at 15:30

## 2023-10-03 RX ADMIN — Medication: at 14:20

## 2023-10-03 RX ADMIN — SODIUM CHLORIDE 1000 ML: 9 INJECTION, SOLUTION INTRAVENOUS at 21:11

## 2023-10-03 RX ADMIN — LABETALOL HYDROCHLORIDE 20 MG: 5 INJECTION, SOLUTION INTRAVENOUS at 00:53

## 2023-10-03 RX ADMIN — BUPIVACAINE HYDROCHLORIDE 10 ML: 2.5 INJECTION, SOLUTION EPIDURAL; INFILTRATION; INTRACAUDAL at 14:10

## 2023-10-03 RX ADMIN — SODIUM CHLORIDE, POTASSIUM CHLORIDE, SODIUM LACTATE AND CALCIUM CHLORIDE 500 ML: 600; 310; 30; 20 INJECTION, SOLUTION INTRAVENOUS at 13:45

## 2023-10-03 RX ADMIN — Medication 2 MILLI-UNITS/MIN: at 06:13

## 2023-10-03 RX ADMIN — MAGNESIUM SULFATE HEPTAHYDRATE 2 G/HR: 40 INJECTION, SOLUTION INTRAVENOUS at 15:38

## 2023-10-03 RX ADMIN — INSULIN HUMAN 1 UNITS/HR: 1 INJECTION, SOLUTION INTRAVENOUS at 16:52

## 2023-10-03 ASSESSMENT — ACTIVITIES OF DAILY LIVING (ADL)
ADLS_ACUITY_SCORE: 20

## 2023-10-03 NOTE — PLAN OF CARE
Patient arrived from home following elevated/severe range BPs on home monitor. Patient also reports possible SROM today with LOF when standing that feels like more than vaginal discharge. Denies bleeding and painful ctx, but does feel mild, not painful, cramping when standing. Regularly mal on monitor, see flow sheet for uterine and fetal assessment. Sustained severe range BPs treated with Labetalol x 1. Denies headache, vision change, and epigastric pain, but hyperreflexive with 1 beat of clonus in each foot. Provider notified of patient status and plan is for sterile spec exam and send swabs to rule out SROM or +Fern. Report to INOCENCIA Mendes at 2320 who assumes patient care.

## 2023-10-03 NOTE — PROGRESS NOTES
Progress Note     S: patient comfortable with epidural     /65   Pulse 83   Temp 98  F (36.7  C) (Axillary)   Resp 18   Wt (P) 97.9 kg (215 lb 12.8 oz)   LMP 2023   SpO2 98%   BMI (P) 37.04 kg/m        SVE:     4-5/70/-2 (per nursing)      FHT:     Baseline 120, mod variability, no accelerations, no decelerations  [place]:    3 in ten         A/P: 33 year old  at 36w3d, here for induction of labor in the setting of preE with severe features.     Induction of Labor  - Incidental AROM with mata placement (0126)  - s/p PO miso x2> now on pitocin 8 mu/min  - GBS neg, no abx  - PPH meds: no methergine  - IUPC PRN  - S/p discussion re: SD precautions     Preeclampsia with Severe Features  - BP now  mild range  - s/p IV labetalol 10/2 2244 and 10/3 0053, 1543  - IV antihypertensives for sustained severe range BP  - HELLP labs nl 10/2 on admission     Type 1 DM  - On home pump per home settings  - Endocrine following   - Insulin gtt PRN     Hypothyroid  - Continue PTA synthroid    Reyna Rangel MD   OBGYN resident PGY3  10/03/2023 4:52 PM

## 2023-10-03 NOTE — PROGRESS NOTES
Post partum insulin pump setting recommendations:    Tandem t-slim- continue to run in control IQ    Basal rates:  Rate #1: 0.7 units/hr 7342-8066  Rate #2 : 0.75 units/hr 0771-5397  Rate #3: 0.45 units/hr 6882-2041  Rate #4: 0.15 units/hr 3515-1220  Rate #5: 0.9 units/hr 1394-1617    Sensitivity or correction factor: decrease to 50  Carb ratio: decrease to 1:12

## 2023-10-03 NOTE — PLAN OF CARE
Patient reporting an increase in back pain. [employer] performed, patient reports improvement in her back, reports more discomfort in front. Requested and received an Epidural, reports good relief. Blood glucose 111, Peds Vascular access to unit to place a second IV. Repeat glucose WNL, will continue hourly glucoses. Report given to Lula Santamaria RN.

## 2023-10-03 NOTE — CONSULTS
"NEW INPATIENT DIABETES MANAGEMENT CONSULT  Lauren CONTRERAS Kuborn  Age: 33 year old  MRN # [id number]   YOB: 1990    Chief Complaint: labor  Reason for Consult: \"glycemic management recommendations\"  Consulting Provider: Kendrick Milton MD    History of Present Illness:   Lauren is a 33 year old female with a PMHx  at 36w3d with a past medical history of type 1 diabetes (noted to be within good control this pregnancy)  and hypothyroidism who was admitted on 10/2/2023 for IOL- noted concerns for pre-eclampsia.     Lauren reports being diagnosed with type 1 diabetes 16 yrs ago. Bgs have been noted to be in good control this pregnancy. Lauren denies frequent or recent hypoglycemia with current settings.     Pt is not known to the Inpatient Diabetes Service from past admission(s).    History obtained via the patient, chart review, and discussion with consulting team.    Interval History:   BG currently within range- varing from  mg/dL    Currently, denies nausea, vomiting or diarrhea. Patient current in labor but notes she is as comfortable as can be expected.       Most recent PTA diabetes regimen includes:     Tandem Insulin pump- control IQ  Basal Rates and Start/End times:   Rate #1 = 1.25 units/hr from 0000 to 1100   Rate #2 = 0.75 units/hr from 1100 to 1500.   Rate #3 = 0.25 units/hr from 1500 to 1930   Rate #4 = 1.5 units/hr from 1930 to 0000.   Bolus settings:  ISF 25  CHO 1:6.5      Prepregnancy:  Basal rates:  Rate #1: 0.7 units/hr 5089-0095  Rate #2 : 1.35 units/hr 3692-5807  Rate #3: 0.85 units/hr 3775-7811  Rate #4: 0.6 units/hr 4735-4960  Rate #5: 1.4 units/hr 1930-000    ISF 35  CHO 1:10    CGM: dexcom    BG upon this admission: 104   Renal function: Creatinine (0.82), eGFR (>90)  BMP: Bicarb (21), Anion Gap (10)  Epic search negative for gad65, insulin antibody, or cpeptide  Receiving steroids: n/a  Pt is eating- has diet ordered.   Pt is not receiving dextrose IVF      Other " Active/Contributory Medical Problems: pregnancy  Diabetes Mellitus Type: 1  Duration:    was diagnosed 16 years ago ,earliest A1c of 9.2% in 2011  Diabetic Complications: none noted on epic  PTA Diet: not clarified  Usual BG control PTA:  good control, with A1c 5.1% on 10/3/2023  History of DKA: no- not on recent epic search   Able to Detect Hypoglycemia: yes feels low in low 70's  Last dilated eye exam: unknown  [facility]  Primary Care Provider: Anali Mera  Current Diet: Orders Placed This Encounter      Moderate Consistent Carb (60 g CHO per Meal) Diet       10 point ROS completed with pertinent positives and negatives noted in the HPI  Past medical, family and social histories are reviewed and updated.    Social History  Social History     Socioeconomic History    Marital status:      Spouse name: None    Number of children: None    Years of education: None    Highest education level: None   Tobacco Use    Smoking status: Never    Smokeless tobacco: Never   Substance and Sexual Activity    Alcohol use: Yes     Comment: Alcoholic Drinks/day: occasionally    Drug use: No        Tobacco: none    Alcohol: none    Marital Status:  to spouse dewey    Place of Residence: [place], [place]    Occupation: works for the [employer]     Family History  Family History   Problem Relation Age of Onset    Thyroid Disease Mother     Crohn's Disease Sister     No Known Problems Brother     Cerebrovascular Disease Maternal Grandfather     No Known Problems Father     No Known Problems Sister          Physical Exam   /76   Temp 97.7  F (36.5  C) (Oral)   Resp 16   LMP 01/18/2023   SpO2 97%   Constitutional: pleasant, in no distress. Lying in bed   HEENT: normocephalic, atraumatic. Oral mucous membranes moist.   Lungs: unlabored respiration, no cough  ABD: rounded  Skin: warm and dry, no obvious lesions  MSK:  moves all extremities  Lymp:  no LE edema    Mental status:  alert, oriented to self, place, time  Psych:  calm and appropriate interaction     Most Recent Laboratory Tests:  Recent Labs   Lab 10/03/23  0734   HGB 12.0     Recent Labs   Lab 10/03/23  0135   A1C 5.1     Recent Labs   Lab 10/02/23  2211   CR 0.82     Recent Labs   Lab 10/03/23  0503 10/03/23  0213 10/02/23  2211   GLC 89 80 104*       Assessment:   Type 1 diabetes in pregnancy with good control    Plan:    -Continue insulin pump   -PRN insulin drip high intensity OB if BG>110 mg/dL   -hypoglycemia protocol   -recommend carb consistent diet with carb counting protocol   -diabetes education needs: insulin pump assessment   -on discharge, will recommend outpatient follow up with Anali Lehman MD- [facility]    -post partum would recommend switching settings to:  Basal rates:  Rate #1: 0.7 units/hr 1086-8269  Rate #2 : 0.75 units/hr 9182-0875  Rate #3: 0.45 units/hr 5767-2468  Rate #4: 0.15 units/hr 0463-6100  Rate #5: 0.9 units/hr 7782-5538    Sensitivity or correction factor: decrease to 50  Carb ratio: decrease to 1:12  Shared these recommended rates with patient and gave paper copy to patient      Discussed plan of care with patient, nursing  Thank you for this consult; Inpatient Diabetes will continue to follow.         Contacting the Inpatient Diabetes Team   From 7AM-5PM: page inpatient diabetes provider that is following the patient, or utilize the job code paging system.   From 5PM-7AM: page the job code for endocrine fellow on call.     Please use the following job code to reach the Inpatient Diabetes team. Note that you must use an in house phone and that job codes cannot receive text pages.   Dial [phone] (or star-star-star [phone] on the new [employer] telephones), then [phone] to reach the endocrine-diabetes provider on call.    I spent a total of 110 minutes face to face or coordinating care of Lauren Goodwin.             Over 50% of my time on the unit was spent counseling the patient  and/or coordinating care regarding acute hyperglycemia management.  See note for details.    ANNEL Millard, CNP  Inpatient Diabetes Service  Pager: [phone]

## 2023-10-03 NOTE — PROGRESS NOTES
[facility]  Labor Progress Note    S:  Patient doing well.    O:   Patient Vitals for the past 4 hrs:   BP Temp Temp src Resp SpO2 Weight   10/03/23 1021 136/74 97.9  F (36.6  C) Oral 18 99 % --   10/03/23 0907 138/87 98.4  F (36.9  C) Oral 18 100 % (P) 97.9 kg (215 lb 12.8 oz)   10/03/23 0800 (!) 156/92 98.3  F (36.8  C) Oral 16 -- --     SVE: 3-4/60/-2     FHT: Baseline 130, mod variability, no accelerations, no decelerations  [place]: Difficult to visualize on monitor, but appears to be 1-3 contractions in 10 minutes      A/P: 33 year old  at 36w3d, here for induction of labor in the setting of preE with severe features.     Induction of Labor  - Incidental AROM with mata placement (0126)  - s/p PO miso x2> now on pitocin 10 mu/min  - GBS neg, no abx  - PPH meds: no methergine  - S/p discussion re: SD precautions     Preeclampsia with Severe Features  - BP now  mild range  - s/p IV labetalol 10/2 2244 and 10/3 0053  - IV antihypertensives for sustained severe range BP  - Consider starting long-acting antihypertensive if persistent  - HELLP labs nl 10/2 on admission     Type 1 DM  - On home pump per home settings  - Endocrine following   - Anticipate possible switch to active labor management of DM prn     Hypothyroid  - Continue PTA synthroid    Philip Jaquez MD, MPH, MS  Obstetrics, Gynecology & Women's Health   Resident, PGY-2  10/03/2023 11:40 AM

## 2023-10-03 NOTE — PLAN OF CARE
Data: Contraction frequency q 2-4 minutes, palpate mild. Fetal assessment 125, moderate variability, accels, present, decels absent. Leaking moderate amounts of meconium stained fluid. Support person Richi present. Patient reports back pain.   Interventions: Continue uterine/fetal assessment. Vital Signs per order set.  Plan: Anticipate . Provide labor/coping assistance as needed by patient and support person. Observe for and notify care provider of indications of progressing labor, need for pain medications, or signs of fetal/maternal compromise.

## 2023-10-03 NOTE — PROGRESS NOTES
[facility] PROGRESS NOTE:   October 3, 2023   0900         SUBJECTIVE:   Patient c/o back pain with contraction.    Contractions:  q 3 minutes  Leakage of fluid:  No  Vaginal bleeding:  No - had some leaking last night  Pain controlled:  Yes           OBJECTIVE:     Vitals:    10/03/23 0731 10/03/23 0800 10/03/23 0907 10/03/23 1021   BP:  (!) 156/92 138/87 136/74   BP Location:       Patient Position:       Cuff Size:       Resp:  16 18 18   Temp:  98.3  F (36.8  C) 98.4  F (36.9  C) 97.9  F (36.6  C)   TempSrc:  Oral Oral Oral   SpO2: 97%  100% 99%         NST:  Fetal Heart Rate Tracing:   Category 1  Baseline: 130  Variability: Moderate  Accels, no decels    Tocometer: q 3 minutes, mild    Abdomen:  gravid, NT  Cervix:   Deferred           LABS:     Recent Results (from the past 12 hour(s))   Adult Type and Screen    Collection Time: 10/03/23  1:00 AM   Result Value Ref Range    ABO/RH(D) A POS     Antibody Screen Negative Negative    SPECIMEN EXPIRATION DATE [id number]    Hemoglobin A1c    Collection Time: 10/03/23  1:35 AM   Result Value Ref Range    Hemoglobin A1C 5.1 <5.7 %   Glucose by meter    Collection Time: 10/03/23  2:13 AM   Result Value Ref Range    GLUCOSE BY METER POCT 80 70 - 99 mg/dL   Asymptomatic COVID-19 Virus (Coronavirus) by PCR Nasopharyngeal    Collection Time: 10/03/23  2:21 AM    Specimen: Nasopharyngeal; Swab   Result Value Ref Range    SARS CoV2 PCR Negative Negative   Glucose by meter    Collection Time: 10/03/23  5:03 AM   Result Value Ref Range    GLUCOSE BY METER POCT 89 70 - 99 mg/dL   ALT    Collection Time: 10/03/23  7:34 AM   Result Value Ref Range    ALT 9 0 - 50 U/L   AST    Collection Time: 10/03/23  7:34 AM   Result Value Ref Range    AST 27 0 - 45 U/L   Creatinine    Collection Time: 10/03/23  7:34 AM   Result Value Ref Range    Creatinine 0.78 0.51 - 0.95 mg/dL    GFR Estimate >90 >60 mL/min/1.73m2   Hemoglobin    Collection Time: 10/03/23  7:34 AM    Result Value Ref Range    Hemoglobin 12.0 11.7 - 15.7 g/dL   Platelet count    Collection Time: 10/03/23  7:34 AM   Result Value Ref Range    Platelet Count 154 150 - 450 10e3/uL   Glucose by meter    Collection Time: 10/03/23  9:26 AM   Result Value Ref Range    GLUCOSE BY METER POCT 77 70 - 99 mg/dL       A/P: 33 year old  at 36w3d, here for induction of labor in the setting of preE with severe features.     Induction of Labor  - Incidental AROM with mata placement (0126)  - s/p PO miso x2> now on pitocin 6 mu/min  - GBS neg, no abx  - PPH meds: no methergine  - S/p discussion re: SD precautions     Preeclampsia with Severe Features  - BP now  mild range  - s/p IV labetalol 10/2 2244 and 10/3 0053  - IV antihypertensives for sustained severe range BP  - Consider starting long-acting antihypertensive if persistent  - HELLP labs nl 10/2 on admission     Type 1 DM  - On home pump per home settings  - Awaiting Endo consult   - Anticipate possible switch to active labor management of DM prn     Hypothyroid  - Continue PTA synthroid       Lula Mendez MD

## 2023-10-03 NOTE — PROGRESS NOTES
Labor Progress Note     S: Feeling okay, just a little sick from the magnesium per her RN.    O:  BP (!) 140/79 (BP Location: Left arm, Patient Position: Semi-Olson's, Cuff Size: Adult Regular)   Temp 98.1  F (36.7  C) (Oral)   Resp 16   LMP 2023   SpO2 98%      FHT: Baseline 120, moderate variability, accelerations present, no decelerations  TOCO: 2 contractions in ten minutes    A/P: 33 year old  at 36w3d, here for induction of labor in the setting of preE with severe features.    Induction of Labor  - Incidental AROM with mata placement (0126)  - s/p PO miso x2> will start pitocin when due for next miso  - GBS neg, no abx  - PPH meds: no methergine  - S/p discussion re: SD precautions    Preeclampsia with Severe Features  - BP now normotensive to low mild range  - s/p IV labetalol 10/2 2244 and 10/3 0053  - IV antihypertensives for sustained severe range BP  - Consider starting long-acting antihypertensive  - HELLP labs nl 10/2 on admission    Type 1 DM  - On home pump per home settings  - Endo consult in AM  - Switch to active labor management of DM prn    Hypothyroid  - Continue PTA synthroid    Anticipate     Kendrick Milton MD  OB/GYN PGY-2  10/03/2023 5:27 AM

## 2023-10-03 NOTE — ANESTHESIA PROCEDURE NOTES
Epidural catheter Procedure Note    Pre-Procedure   Staff -        Anesthesiologist:  Raymond Marquez MD       Resident/Fellow: Aayush Hinton MD       Performed By: resident       Location: OB       Procedure Start/Stop Times: 10/3/2023 2:10 PM and 10/3/2023 2:25 PM       Pre-Anesthestic Checklist: patient identified, IV checked, risks and benefits discussed, informed consent, monitors and equipment checked, pre-op evaluation, at physician/surgeon's request and post-op pain management  Timeout:       Correct Patient: Yes        Correct Procedure: Yes        Correct Site: Yes        Correct Position: Yes   Procedure Documentation  Procedure: epidural catheter       Patient Position: sitting       Skin prep: Chloraprep       Local skin infiltrated with mL of 2% lidocaine.        Insertion Site: L3-4. (midline approach).       Technique: LORT saline        MARIBELL at 6 cm.       Needle Type: [employer] needle       Needle Gauge: 17.        Needle Length (Inches): 3.5        Catheter: 19 G.          Catheter threaded easily.         7 cm epidural space.         Threaded 13 cm at skin.         # of attempts: 1 and  # of redirects:  2    Assessment/Narrative         Paresthesias: No.       Test dose of 3 mL lidocaine 1.5% w/ 1:200,000 epinephrine at.         Test dose negative, 3 minutes after injection, for signs of intravascular, subdural, or intrathecal injection.       Insertion/Infusion Method: LORT saline       No aspiration negative for Heme or CSF via Epidural Catheter.       Sensory Level Left: T7.       Sensory Level Right: T7.    Medication(s) Administered   0.125% bupivacaine 5 mL + fentanyl 20 mcg + NS 5 mL (Epidural) (Mixture components: BUPivacaine HCl (PF) 0.25 % Soln, 5 mL; fentaNYL (PF) 100 MCG/2ML Soln, 20 mcg; sodium chloride 0.9 % Soln, 5 mL) - EPIDURAL   10 mL - 10/3/2023 2:10:00 PM  Medication Administration Time: 10/3/2023 2:10 PM      FOR [facility] ([facility]) ONLY:   Pain Team Contact information:  "please page the Pain Team Via [facility]. Search \"Pain\". During daytime hours, please page the attending first. At night please page the resident first.      "

## 2023-10-03 NOTE — PLAN OF CARE
Lauren transitioned to an insulin drip this shift per orders. Pitocin titrated per orders- currently at 8 richard units per minute. VSS. Patient afebrile this shift. Epidural working well, patient denies having pain. Plan of care ongoing. Frequent blood glucose checks, frequent repositioning, continuous fetal and uterine monitoring.  with moderate variability. Accels present, decels are not present. Contractions every 2-3 minutes palpating strong. Report given to Domitila, RNs who will be assuming care at this time.

## 2023-10-03 NOTE — H&P
[facility]  Obstetrics History and Physical     Lauren Goodwin MRN# [id number]   YOB: 1990 Age: 33 year old      Date of Admission: 10/2/2023      HPI:     Lauren Goodwin is a 33 year old  at 36w2d by LMP c/w 8w6d US who presents today for elevated blood pressures. The patient follows at [facility] and was diverted here. She was diagnosed with pre-eclampsia without severe features last week and scheduled for an induction at 36+6. Yesterday, she noted onset of mild white discharge. Today the discharge has been thinner and pink tinged, persistent throughout the day. She also noted onset of mild cramps when walking. She took her blood pressure this afternoon and found it to be in the 150s systolic and called the [place] line who diverted her here for further evaluation. Upon arrival she was noted to have persistent blood pressures >160 systolic, meeting criteria for pre-eclampsia with severe features. She reports good fetal movement. She denies fever, chills, SOB, chest pain, palpitations. No concerns for headache, vision changes, RUQ or epigastric pain.     Pregnancy notable for:   Pre-eclampsia with severe features.   Type I Diabetes  Hashimotos thyroiditis   Polyhydramnios  Suspected fetal macrosomia, EFW >99%    No history of asthma or high blood pressure.    Of note, the patient has a history of Type I diabetes which has been well controlled throughout pregnancy. She uses an insulin pump and continuous glucose monitor. She does have a postpartum insulin regimen per endocrine. Most recent fetal ultrasound with findings of polyhydramnios and macrosomia, EFW 3623 g, 99th percentile. Also noted to have left mild pyelectasis, recommended follow up with urology following delivery.     OB History:    OB History    Para Term  AB Living   1 0 0 0 0 0   SAB IAB Ectopic Multiple Live Births   0 0 0 0 0      # Outcome Date GA Lbr Isacc/2nd Weight Sex  Delivery Anes PTL Lv   1 Current                          Past Medical History:   No past medical history on file.          Past Surgical History:     Past Surgical History:   Procedure Laterality Date    MOUTH SURGERY      NO PAST SURGERIES      PILONIDAL CYST / SINUS EXCISION N/A 11/20/2018    Procedure: PILONIDAL TRACTOTECTOMY;  Surgeon: Adriel Banuelos MD;  Location: [facility];  Service: General    WISDOM TOOTH EXTRACTION               Social History:     Social History     Tobacco Use    Smoking status: Never    Smokeless tobacco: Never   Substance Use Topics    Alcohol use: Yes     Comment: Alcoholic Drinks/day: occasionally             Family History:     Family History   Problem Relation Age of Onset    Thyroid Disease Mother     Crohn's Disease Sister     No Known Problems Brother     Cerebrovascular Disease Maternal Grandfather     No Known Problems Father     No Known Problems Sister              Immunizations:     Immunization History   Administered Date(s) Administered    COVID-19 Bivalent 12+ (Pfizer) 12/06/2022            Allergies:   No Known Allergies          Medications:     Medications Prior to Admission   Medication Sig Dispense Refill Last Dose    aspirin (ASA) 81 MG chewable tablet Take 162 mg by mouth daily       insulin lispro (ADMELOG SOLOSTAR U-100 INSULIN) 100 unit/mL pen [INSULIN LISPRO (ADMELOG SOLOSTAR U-100 INSULIN) 100 UNIT/ML PEN] Use up to 65 units daily in insulin pump; please dispense vials, not pens 60 mL 0 10/2/2023    levothyroxine (SYNTHROID/LEVOTHROID) 50 MCG tablet Take 50 mcg by mouth daily   10/2/2023    Prenatal Vit-Fe Fumarate-FA (PRENATAL MULTIVITAMIN  PLUS IRON) 27-1 MG TABS Take 1 tablet by mouth daily       blood glucose test (CONTOUR NEXT TEST STRIPS) strips [BLOOD GLUCOSE TEST (CONTOUR NEXT TEST STRIPS) STRIPS] Test up to 6 times daily 200 strip 5     norethindrone-ethinyl estradiol (MICROGESTIN 1/20) 1-20 mg-mcg per tablet [NORETHINDRONE-ETHINYL ESTRADIOL  (MICROGESTIN ) 1-20 MG-MCG PER TABLET] Take 1 tablet by mouth daily. 3 Package 4     NOVOLOG U-100 INSULIN ASPART 100 unit/mL injection [NOVOLOG U-100 INSULIN ASPART 100 UNIT/ML INJECTION] Use in insulin pump up to 60 units daily 10 mL 11              Review of Systems & Physical Exam:     The Review of Systems is negative other than noted in the HPI      /78   Temp 98.3  F (36.8  C) (Oral)   Resp 16   LMP 2023   Gen: Well appearing, in no acute distress.   CV: Regular rate  Lungs: non-labored breathing   Abd: Gravid, non-tender, non-distended  Ext: Bilateral lower extremity edema, 1-2+ bilaterally, wearing compression socks    Cervix: 1.5/70/-3  Membranes: intact on exam  Presentation: cephalic by BSUS  Estimated Fetal Weight: 8.5# by [place]    FHT:  Monitoring External  FHT: Baseline 140 bpm; moderate variability; accels present; no decelerations  TOCO 2-3 contractions in 10 minutes         Data:   Prenatal Lab Results:  Lab Results   Component Value Date    GCPCRT Negative 2018    HGB 11.9 10/02/2023       GBS Status: Negative      Assessment and Plan:     Lauren Goodwin is a 33 year old  at 36w2d by LMP admitted for pre-eclampsia with severe features.     Pregnancy notable for pre-eclampsia with severe features.     Induction of Labor  - Admit for IOL in the setting of pre-eclampsia with severe features.   - Cervix: 1.5/70/-3  - Cervical ripening: plan initially balloon + vag miso; incidental AROM with balloon placement so will plan for PO miso and then reassess.  - Membranes: now s/p incidental AROM with balloon placement at 0226  - Augmentation: plan pitocin per protocol  - Labs: CBC, T&S  - GBS negative; Antibiotics not indicated  - Pain Control: Per patient request; Discussed options    - Vistaril and IM Morphine for therapeutic sleep PRN   - Desires likely epidural in active labor but will make decisions as come  - PPH Meds: no methergine  - PPx: SCDs    Pre-eclampsia with  Severe Features  - Diagnosed by severe range BPs on admission  - IV labetalol at 2244 on 10/2 and 0053 on 10/3  - HELLP labs nl on admission  - Magnesium sulfate 4g bolus> 2g/h until 24h postpartum  - Currently asymptomatic  - IV antihypertensives for sustained severe range BPs (>160/>110)  - No PTA meds    Hsitory of Type I Diabetes   - Continue PTA long-acting insulin, will consider cutting dose in half depending on amount of PO intake  - Continue PTA insulin pump  - MSSI  - Blood Glucose Checks:   - Early Labor: Fasting, AC in early labor   - Active Labor: Transition to q1hr  - Insulin gtt prn + D5 NS  - Will avoid BMZ, despite  status. Increased risk of hyperglycemia.   - Endocrinology consult   - EFW 3623g, 99% (23); AC 99%, HC:AC 1.0   - Shoulder dystocia precautions discussed. Explained that a shoulder dystocia is an emergency, and there are maneuvers to help alleviate the dystocia. These maneuvers can be traumatizing for the mom and baby, and can result in fetal injury, including nerve injury, broken bones, bruising, and brain injury. Maternal complications include perineal lacerations, hemorrhage, and birth trauma. The goal of relieving a dystocia is to prevent permanent brain damage from hypoxia.    Hypothyroidism  - continue PTA synthroid 50 mcg    PNC/FWB  - Rh negative, Rubella immune, GBS negative  - Cat 1 tracing, reactive; cephalic by BSUS; EFW 8.5#  - Continuous Fetal Monitoring  - Intrauterine resuscitative measures prn      Patient discussed with Dr. Albin Nielson, MS3  Medical Student         I was present with the medical student who participated in the service and in the documentation of this note.  I have verified the history and personally performed the physical exam and medical decision making, and have verified the content of the note, which accurately reflect my assessment of the patient and the plan of care.    Kendrick Milton MD  OB/GYN PGY-2  10/03/2023 1:40 AM

## 2023-10-03 NOTE — PROVIDER NOTIFICATION
10/02/23 2235   Provider Notification   Provider Name/Title Dr. Talamantes   Method of Notification In Department   Request Evaluate - Remote   Notification Reason Maternal Vital Sign Change     Provider notified of pt having 2nd severe range BP in <1 hr, per MD give 20 mg labetalol.

## 2023-10-03 NOTE — PROVIDER NOTIFICATION
10/03/23 1523   Provider Notification   Provider Name/Title Dr Apoadca   Method of Notification In Department   Notification Reason Maternal Vital Sign Change;Status Update       Dr Apodaca updated on elevated diastolics, some data deleted due to patient on her side and blood pressure cuff on lower side due to Dexcom placement. Patient will replace dexcom at 1700 enabling right arm usage for blood pressures. Patient is currently meeting criteria for severe range blood pressures, 20mg Labetalol administered. Lula Santamaria RN at bedside with patient.

## 2023-10-03 NOTE — ANESTHESIA PREPROCEDURE EVALUATION
Anesthesia Pre-Procedure Evaluation    Patient: Lauren Goodwin   MRN: [id number] : 1990        Procedure :           No past medical history on file.   Past Surgical History:   Procedure Laterality Date    MOUTH SURGERY      NO PAST SURGERIES      PILONIDAL CYST / SINUS EXCISION N/A 2018    Procedure: PILONIDAL TRACTOTECTOMY;  Surgeon: Adriel Banuelos MD;  Location: [facility];  Service: General    WISDOM TOOTH EXTRACTION        No Known Allergies   Social History     Tobacco Use    Smoking status: Never    Smokeless tobacco: Never   Substance Use Topics    Alcohol use: Yes     Comment: Alcoholic Drinks/day: occasionally      Wt Readings from Last 1 Encounters:   10/03/23 (P) 97.9 kg (215 lb 12.8 oz)        Anesthesia Evaluation   Pt has had prior anesthetic. Type: General.    No history of anesthetic complications       ROS/MED HX  ENT/Pulmonary:    (-) asthma   Neurologic:  - neg neurologic ROS     Cardiovascular:     (+)  - - PIH and Mg ++ gtt and BP Meds  -  - -                                      METS/Exercise Tolerance:     Hematologic:  - neg hematologic  ROS     Musculoskeletal:       GI/Hepatic:  - neg GI/hepatic ROS     Renal/Genitourinary:       Endo:     (+) type I DM,    Using insulin, - using insulin pump.                Psychiatric/Substance Use:  - neg psychiatric ROS     Infectious Disease:       Malignancy:       Other:            Physical Exam    Airway        Mallampati: II   TM distance: > 3 FB   Neck ROM: full   Mouth opening: > 3 cm    Respiratory Devices and Support         Dental  no notable dental history         Cardiovascular   cardiovascular exam normal          Pulmonary   pulmonary exam normal                OUTSIDE LABS:  CBC:   Lab Results   Component Value Date    WBC 10.1 10/02/2023    WBC 10.8 2021    HGB 12.0 10/03/2023    HGB 11.9 10/02/2023    HCT 34.6 (L) 10/02/2023    HCT 42.8 2021     10/03/2023     10/02/2023     BMP:   Lab  Results   Component Value Date     10/02/2023     07/17/2021    POTASSIUM 4.0 10/02/2023    POTASSIUM 3.5 07/17/2021    CHLORIDE 106 10/02/2023    CHLORIDE 102 07/17/2021    CO2 21 (L) 10/02/2023    CO2 26 07/17/2021    BUN 16.3 10/02/2023    BUN 13 07/17/2021    CR 0.78 10/03/2023    CR 0.82 10/02/2023     (H) 10/03/2023    GLC 77 10/03/2023     COAGS: No results found for: PTT, INR, FIBR  POC:   Lab Results   Component Value Date    HCGS Negative 07/17/2021     HEPATIC:   Lab Results   Component Value Date    ALBUMIN 3.0 (L) 10/02/2023    PROTTOTAL 6.0 (L) 10/02/2023    ALT 9 10/03/2023    AST 27 10/03/2023    ALKPHOS 173 (H) 10/02/2023    BILITOTAL 0.3 10/02/2023     OTHER:   Lab Results   Component Value Date    A1C 5.1 10/03/2023    ROSE 9.0 10/02/2023    TSH 5.73 (H) 07/10/2018       Anesthesia Plan    ASA Status:  3       Anesthesia Type: Epidural.              Consents    Anesthesia Plan(s) and associated risks, benefits, and realistic alternatives discussed. Questions answered and patient/representative(s) expressed understanding.     - Discussed:     - Discussed with:  Patient            Postoperative Care            Comments:                Aayush Hinton MD

## 2023-10-03 NOTE — PLAN OF CARE
"Pt rested well overnight. Treated x1 for severe BP. Other VSS. Ruptured at 0125- meconium stained fluid. Denies HA, blurry vision, epigastric pain. Pt on Mag, PIT, and has T1DM. FHR and toco monitored- see flowsheets. Call light within reach.   Problem: Plan of Care - These are the overarching goals to be used throughout the patient stay.    Goal: Patient-Specific Goal (Individualized)  Description: You can add care plan individualizations to a care plan. Examples of Individualization might be:  \"Parent requests to be called daily at 9am for status\", \"I have a hard time hearing out of my right ear\", or \"Do not touch me to wake me up as it startles me\".  Outcome: Progressing          "

## 2023-10-04 LAB
ALT SERPL W P-5'-P-CCNC: 10 U/L (ref 0–50)
ALT SERPL W P-5'-P-CCNC: 5 U/L (ref 0–50)
ALT SERPL W P-5'-P-CCNC: 9 U/L (ref 0–50)
ANION GAP SERPL CALCULATED.3IONS-SCNC: 15 MMOL/L (ref 7–15)
AST SERPL W P-5'-P-CCNC: 22 U/L (ref 0–45)
AST SERPL W P-5'-P-CCNC: 30 U/L (ref 0–45)
AST SERPL W P-5'-P-CCNC: 32 U/L (ref 0–45)
B-OH-BUTYR SERPL-SCNC: 0.9 MMOL/L
BUN SERPL-MCNC: 18.9 MG/DL (ref 6–20)
CALCIUM SERPL-MCNC: 7.2 MG/DL (ref 8.6–10)
CHLORIDE SERPL-SCNC: 101 MMOL/L (ref 98–107)
CREAT SERPL-MCNC: 0.99 MG/DL (ref 0.51–0.95)
CREAT SERPL-MCNC: 1.15 MG/DL (ref 0.51–0.95)
CREAT SERPL-MCNC: 1.16 MG/DL (ref 0.51–0.95)
CREAT SERPL-MCNC: 1.16 MG/DL (ref 0.51–0.95)
CREAT SERPL-MCNC: 1.17 MG/DL (ref 0.51–0.95)
DEPRECATED HCO3 PLAS-SCNC: 16 MMOL/L (ref 22–29)
EGFRCR SERPLBLD CKD-EPI 2021: 63 ML/MIN/1.73M2
EGFRCR SERPLBLD CKD-EPI 2021: 64 ML/MIN/1.73M2
EGFRCR SERPLBLD CKD-EPI 2021: 64 ML/MIN/1.73M2
EGFRCR SERPLBLD CKD-EPI 2021: 65 ML/MIN/1.73M2
EGFRCR SERPLBLD CKD-EPI 2021: 77 ML/MIN/1.73M2
ERYTHROCYTE [DISTWIDTH] IN BLOOD BY AUTOMATED COUNT: 13.2 % (ref 10–15)
GLUCOSE BLDC GLUCOMTR-MCNC: 121 MG/DL (ref 70–99)
GLUCOSE BLDC GLUCOMTR-MCNC: 125 MG/DL (ref 70–99)
GLUCOSE BLDC GLUCOMTR-MCNC: 131 MG/DL (ref 70–99)
GLUCOSE BLDC GLUCOMTR-MCNC: 134 MG/DL (ref 70–99)
GLUCOSE BLDC GLUCOMTR-MCNC: 137 MG/DL (ref 70–99)
GLUCOSE BLDC GLUCOMTR-MCNC: 144 MG/DL (ref 70–99)
GLUCOSE BLDC GLUCOMTR-MCNC: 144 MG/DL (ref 70–99)
GLUCOSE BLDC GLUCOMTR-MCNC: 146 MG/DL (ref 70–99)
GLUCOSE BLDC GLUCOMTR-MCNC: 147 MG/DL (ref 70–99)
GLUCOSE BLDC GLUCOMTR-MCNC: 152 MG/DL (ref 70–99)
GLUCOSE BLDC GLUCOMTR-MCNC: 157 MG/DL (ref 70–99)
GLUCOSE BLDC GLUCOMTR-MCNC: 157 MG/DL (ref 70–99)
GLUCOSE BLDC GLUCOMTR-MCNC: 161 MG/DL (ref 70–99)
GLUCOSE BLDC GLUCOMTR-MCNC: 166 MG/DL (ref 70–99)
GLUCOSE BLDC GLUCOMTR-MCNC: 172 MG/DL (ref 70–99)
GLUCOSE BLDC GLUCOMTR-MCNC: 174 MG/DL (ref 70–99)
GLUCOSE BLDC GLUCOMTR-MCNC: 190 MG/DL (ref 70–99)
GLUCOSE SERPL-MCNC: 170 MG/DL (ref 70–99)
HCT VFR BLD AUTO: 39.3 % (ref 35–47)
HGB BLD-MCNC: 11.3 G/DL (ref 11.7–15.7)
HGB BLD-MCNC: 13 G/DL (ref 11.7–15.7)
HGB BLD-MCNC: 8.9 G/DL (ref 11.7–15.7)
MAGNESIUM SERPL-MCNC: 7.6 MG/DL (ref 1.7–2.3)
MAGNESIUM SERPL-MCNC: 8.6 MG/DL (ref 1.7–2.3)
MCH RBC QN AUTO: 29.7 PG (ref 26.5–33)
MCHC RBC AUTO-ENTMCNC: 33.1 G/DL (ref 31.5–36.5)
MCV RBC AUTO: 90 FL (ref 78–100)
PLATELET # BLD AUTO: 168 10E3/UL (ref 150–450)
PLATELET # BLD AUTO: 171 10E3/UL (ref 150–450)
PLATELET # BLD AUTO: 177 10E3/UL (ref 150–450)
POTASSIUM SERPL-SCNC: 4 MMOL/L (ref 3.4–5.3)
RBC # BLD AUTO: 4.37 10E6/UL (ref 3.8–5.2)
SODIUM SERPL-SCNC: 132 MMOL/L (ref 135–145)
WBC # BLD AUTO: 14.8 10E3/UL (ref 4–11)

## 2023-10-04 PROCEDURE — 250N000011 HC RX IP 250 OP 636: Performed by: STUDENT IN AN ORGANIZED HEALTH CARE EDUCATION/TRAINING PROGRAM

## 2023-10-04 PROCEDURE — 83735 ASSAY OF MAGNESIUM: CPT

## 2023-10-04 PROCEDURE — 85049 AUTOMATED PLATELET COUNT: CPT

## 2023-10-04 PROCEDURE — 84460 ALANINE AMINO (ALT) (SGPT): CPT

## 2023-10-04 PROCEDURE — 710N000010 HC RECOVERY PHASE 1, LEVEL 2, PER MIN: Performed by: OBSTETRICS & GYNECOLOGY

## 2023-10-04 PROCEDURE — 82565 ASSAY OF CREATININE: CPT

## 2023-10-04 PROCEDURE — 85018 HEMOGLOBIN: CPT

## 2023-10-04 PROCEDURE — 99207 PR NO BILLABLE SERVICE THIS VISIT: CPT | Performed by: OBSTETRICS & GYNECOLOGY

## 2023-10-04 PROCEDURE — 36415 COLL VENOUS BLD VENIPUNCTURE: CPT

## 2023-10-04 PROCEDURE — 84450 TRANSFERASE (AST) (SGOT): CPT

## 2023-10-04 PROCEDURE — 258N000003 HC RX IP 258 OP 636

## 2023-10-04 PROCEDURE — 120N000002 HC R&B MED SURG/OB UMMC

## 2023-10-04 PROCEDURE — 250N000011 HC RX IP 250 OP 636

## 2023-10-04 PROCEDURE — 250N000009 HC RX 250: Performed by: OBSTETRICS & GYNECOLOGY

## 2023-10-04 PROCEDURE — 360N000076 HC SURGERY LEVEL 3, PER MIN: Performed by: OBSTETRICS & GYNECOLOGY

## 2023-10-04 PROCEDURE — 88307 TISSUE EXAM BY PATHOLOGIST: CPT | Mod: 26 | Performed by: PATHOLOGY

## 2023-10-04 PROCEDURE — 272N000001 HC OR GENERAL SUPPLY STERILE: Performed by: OBSTETRICS & GYNECOLOGY

## 2023-10-04 PROCEDURE — 250N000009 HC RX 250

## 2023-10-04 PROCEDURE — 80048 BASIC METABOLIC PNL TOTAL CA: CPT

## 2023-10-04 PROCEDURE — 250N000011 HC RX IP 250 OP 636: Mod: JZ

## 2023-10-04 PROCEDURE — 88307 TISSUE EXAM BY PATHOLOGIST: CPT | Mod: TC

## 2023-10-04 PROCEDURE — 999N000157 HC STATISTIC RCP TIME EA 10 MIN

## 2023-10-04 PROCEDURE — 370N000017 HC ANESTHESIA TECHNICAL FEE, PER MIN: Performed by: OBSTETRICS & GYNECOLOGY

## 2023-10-04 PROCEDURE — 250N000011 HC RX IP 250 OP 636: Mod: JZ | Performed by: STUDENT IN AN ORGANIZED HEALTH CARE EDUCATION/TRAINING PROGRAM

## 2023-10-04 PROCEDURE — 99233 SBSQ HOSP IP/OBS HIGH 50: CPT

## 2023-10-04 PROCEDURE — 59514 CESAREAN DELIVERY ONLY: CPT | Mod: GC | Performed by: OBSTETRICS & GYNECOLOGY

## 2023-10-04 PROCEDURE — 250N000013 HC RX MED GY IP 250 OP 250 PS 637

## 2023-10-04 PROCEDURE — 271N000001 HC OR GENERAL SUPPLY NON-STERILE: Performed by: OBSTETRICS & GYNECOLOGY

## 2023-10-04 RX ORDER — HYDROCORTISONE 25 MG/G
CREAM TOPICAL 3 TIMES DAILY PRN
Status: DISCONTINUED | OUTPATIENT
Start: 2023-10-04 | End: 2023-10-06 | Stop reason: HOSPADM

## 2023-10-04 RX ORDER — CARBOPROST TROMETHAMINE 250 UG/ML
250 INJECTION, SOLUTION INTRAMUSCULAR
Status: DISCONTINUED | OUTPATIENT
Start: 2023-10-04 | End: 2023-10-06 | Stop reason: HOSPADM

## 2023-10-04 RX ORDER — AZITHROMYCIN 500 MG/5ML
500 INJECTION, POWDER, LYOPHILIZED, FOR SOLUTION INTRAVENOUS
Status: COMPLETED | OUTPATIENT
Start: 2023-10-04 | End: 2023-10-04

## 2023-10-04 RX ORDER — OXYCODONE HYDROCHLORIDE 5 MG/1
5 TABLET ORAL EVERY 4 HOURS PRN
Status: DISCONTINUED | OUTPATIENT
Start: 2023-10-04 | End: 2023-10-06 | Stop reason: HOSPADM

## 2023-10-04 RX ORDER — CITRIC ACID/SODIUM CITRATE 334-500MG
30 SOLUTION, ORAL ORAL
Status: COMPLETED | OUTPATIENT
Start: 2023-10-04 | End: 2023-10-04

## 2023-10-04 RX ORDER — AMOXICILLIN 250 MG
1 CAPSULE ORAL 2 TIMES DAILY
Status: DISCONTINUED | OUTPATIENT
Start: 2023-10-04 | End: 2023-10-06 | Stop reason: HOSPADM

## 2023-10-04 RX ORDER — IBUPROFEN 800 MG/1
800 TABLET, FILM COATED ORAL EVERY 6 HOURS
Status: DISCONTINUED | OUTPATIENT
Start: 2023-10-05 | End: 2023-10-06 | Stop reason: HOSPADM

## 2023-10-04 RX ORDER — ONDANSETRON 2 MG/ML
4 INJECTION INTRAMUSCULAR; INTRAVENOUS EVERY 6 HOURS PRN
Status: DISCONTINUED | OUTPATIENT
Start: 2023-10-04 | End: 2023-10-06 | Stop reason: HOSPADM

## 2023-10-04 RX ORDER — BISACODYL 10 MG
10 SUPPOSITORY, RECTAL RECTAL DAILY PRN
Status: DISCONTINUED | OUTPATIENT
Start: 2023-10-06 | End: 2023-10-06 | Stop reason: HOSPADM

## 2023-10-04 RX ORDER — ACETAMINOPHEN 325 MG/1
975 TABLET ORAL ONCE
Status: COMPLETED | OUTPATIENT
Start: 2023-10-04 | End: 2023-10-04

## 2023-10-04 RX ORDER — LIDOCAINE 40 MG/G
CREAM TOPICAL
Status: DISCONTINUED | OUTPATIENT
Start: 2023-10-04 | End: 2023-10-06 | Stop reason: HOSPADM

## 2023-10-04 RX ORDER — SODIUM CHLORIDE, SODIUM LACTATE, POTASSIUM CHLORIDE, CALCIUM CHLORIDE 600; 310; 30; 20 MG/100ML; MG/100ML; MG/100ML; MG/100ML
INJECTION, SOLUTION INTRAVENOUS CONTINUOUS
Status: DISCONTINUED | OUTPATIENT
Start: 2023-10-04 | End: 2023-10-04 | Stop reason: HOSPADM

## 2023-10-04 RX ORDER — NALOXONE HYDROCHLORIDE 0.4 MG/ML
0.2 INJECTION, SOLUTION INTRAMUSCULAR; INTRAVENOUS; SUBCUTANEOUS
Status: DISCONTINUED | OUTPATIENT
Start: 2023-10-04 | End: 2023-10-06 | Stop reason: HOSPADM

## 2023-10-04 RX ORDER — MISOPROSTOL 200 UG/1
800 TABLET ORAL
Status: DISCONTINUED | OUTPATIENT
Start: 2023-10-04 | End: 2023-10-04 | Stop reason: HOSPADM

## 2023-10-04 RX ORDER — OXYTOCIN/0.9 % SODIUM CHLORIDE 30/500 ML
100-340 PLASTIC BAG, INJECTION (ML) INTRAVENOUS CONTINUOUS PRN
Status: DISCONTINUED | OUTPATIENT
Start: 2023-10-04 | End: 2023-10-06 | Stop reason: HOSPADM

## 2023-10-04 RX ORDER — ONDANSETRON 4 MG/1
4 TABLET, ORALLY DISINTEGRATING ORAL EVERY 6 HOURS PRN
Status: DISCONTINUED | OUTPATIENT
Start: 2023-10-04 | End: 2023-10-06 | Stop reason: HOSPADM

## 2023-10-04 RX ORDER — OXYTOCIN/0.9 % SODIUM CHLORIDE 30/500 ML
340 PLASTIC BAG, INJECTION (ML) INTRAVENOUS CONTINUOUS PRN
Status: COMPLETED | OUTPATIENT
Start: 2023-10-04 | End: 2023-10-04

## 2023-10-04 RX ORDER — TRANEXAMIC ACID 10 MG/ML
1 INJECTION, SOLUTION INTRAVENOUS EVERY 30 MIN PRN
Status: DISCONTINUED | OUTPATIENT
Start: 2023-10-04 | End: 2023-10-06 | Stop reason: HOSPADM

## 2023-10-04 RX ORDER — MISOPROSTOL 200 UG/1
400 TABLET ORAL
Status: DISCONTINUED | OUTPATIENT
Start: 2023-10-04 | End: 2023-10-06 | Stop reason: HOSPADM

## 2023-10-04 RX ORDER — ONDANSETRON 2 MG/ML
INJECTION INTRAMUSCULAR; INTRAVENOUS PRN
Status: DISCONTINUED | OUTPATIENT
Start: 2023-10-04 | End: 2023-10-04

## 2023-10-04 RX ORDER — ENOXAPARIN SODIUM 100 MG/ML
40 INJECTION SUBCUTANEOUS EVERY 24 HOURS
Status: DISCONTINUED | OUTPATIENT
Start: 2023-10-04 | End: 2023-10-06 | Stop reason: HOSPADM

## 2023-10-04 RX ORDER — MISOPROSTOL 200 UG/1
400 TABLET ORAL
Status: DISCONTINUED | OUTPATIENT
Start: 2023-10-04 | End: 2023-10-04 | Stop reason: HOSPADM

## 2023-10-04 RX ORDER — CARBOPROST TROMETHAMINE 250 UG/ML
250 INJECTION, SOLUTION INTRAMUSCULAR
Status: DISCONTINUED | OUTPATIENT
Start: 2023-10-04 | End: 2023-10-04 | Stop reason: HOSPADM

## 2023-10-04 RX ORDER — PROCHLORPERAZINE 25 MG
25 SUPPOSITORY, RECTAL RECTAL EVERY 12 HOURS PRN
Status: DISCONTINUED | OUTPATIENT
Start: 2023-10-04 | End: 2023-10-06 | Stop reason: HOSPADM

## 2023-10-04 RX ORDER — LIDOCAINE 40 MG/G
CREAM TOPICAL
Status: DISCONTINUED | OUTPATIENT
Start: 2023-10-04 | End: 2023-10-04 | Stop reason: HOSPADM

## 2023-10-04 RX ORDER — CEFAZOLIN SODIUM/WATER 2 G/20 ML
2 SYRINGE (ML) INTRAVENOUS
Status: DISCONTINUED | OUTPATIENT
Start: 2023-10-04 | End: 2023-10-04 | Stop reason: HOSPADM

## 2023-10-04 RX ORDER — SIMETHICONE 80 MG
80 TABLET,CHEWABLE ORAL 4 TIMES DAILY PRN
Status: DISCONTINUED | OUTPATIENT
Start: 2023-10-04 | End: 2023-10-06 | Stop reason: HOSPADM

## 2023-10-04 RX ORDER — PROCHLORPERAZINE MALEATE 10 MG
10 TABLET ORAL EVERY 6 HOURS PRN
Status: DISCONTINUED | OUTPATIENT
Start: 2023-10-04 | End: 2023-10-06 | Stop reason: HOSPADM

## 2023-10-04 RX ORDER — ACETAMINOPHEN 325 MG/1
975 TABLET ORAL EVERY 6 HOURS
Status: DISCONTINUED | OUTPATIENT
Start: 2023-10-04 | End: 2023-10-06 | Stop reason: HOSPADM

## 2023-10-04 RX ORDER — NALOXONE HYDROCHLORIDE 0.4 MG/ML
0.4 INJECTION, SOLUTION INTRAMUSCULAR; INTRAVENOUS; SUBCUTANEOUS
Status: DISCONTINUED | OUTPATIENT
Start: 2023-10-04 | End: 2023-10-06 | Stop reason: HOSPADM

## 2023-10-04 RX ORDER — MISOPROSTOL 200 UG/1
800 TABLET ORAL
Status: DISCONTINUED | OUTPATIENT
Start: 2023-10-04 | End: 2023-10-06 | Stop reason: HOSPADM

## 2023-10-04 RX ORDER — METHYLERGONOVINE MALEATE 0.2 MG/ML
200 INJECTION INTRAVENOUS
Status: DISCONTINUED | OUTPATIENT
Start: 2023-10-04 | End: 2023-10-06 | Stop reason: HOSPADM

## 2023-10-04 RX ORDER — MORPHINE SULFATE 1 MG/ML
INJECTION, SOLUTION EPIDURAL; INTRATHECAL; INTRAVENOUS PRN
Status: DISCONTINUED | OUTPATIENT
Start: 2023-10-04 | End: 2023-10-04

## 2023-10-04 RX ORDER — OXYTOCIN/0.9 % SODIUM CHLORIDE 30/500 ML
340 PLASTIC BAG, INJECTION (ML) INTRAVENOUS CONTINUOUS PRN
Status: DISCONTINUED | OUTPATIENT
Start: 2023-10-04 | End: 2023-10-06 | Stop reason: HOSPADM

## 2023-10-04 RX ORDER — KETOROLAC TROMETHAMINE 30 MG/ML
30 INJECTION, SOLUTION INTRAMUSCULAR; INTRAVENOUS EVERY 6 HOURS
Status: ACTIVE | OUTPATIENT
Start: 2023-10-04 | End: 2023-10-05

## 2023-10-04 RX ORDER — METOCLOPRAMIDE 10 MG/1
10 TABLET ORAL EVERY 6 HOURS PRN
Status: DISCONTINUED | OUTPATIENT
Start: 2023-10-04 | End: 2023-10-06 | Stop reason: HOSPADM

## 2023-10-04 RX ORDER — METOCLOPRAMIDE HYDROCHLORIDE 5 MG/ML
10 INJECTION INTRAMUSCULAR; INTRAVENOUS EVERY 6 HOURS PRN
Status: DISCONTINUED | OUTPATIENT
Start: 2023-10-04 | End: 2023-10-06 | Stop reason: HOSPADM

## 2023-10-04 RX ORDER — MAGNESIUM HYDROXIDE 1200 MG/15ML
LIQUID ORAL PRN
Status: DISCONTINUED | OUTPATIENT
Start: 2023-10-04 | End: 2023-10-06 | Stop reason: HOSPADM

## 2023-10-04 RX ORDER — TRANEXAMIC ACID 10 MG/ML
1 INJECTION, SOLUTION INTRAVENOUS EVERY 30 MIN PRN
Status: DISCONTINUED | OUTPATIENT
Start: 2023-10-04 | End: 2023-10-04 | Stop reason: HOSPADM

## 2023-10-04 RX ORDER — METHYLERGONOVINE MALEATE 0.2 MG/ML
200 INJECTION INTRAVENOUS
Status: DISCONTINUED | OUTPATIENT
Start: 2023-10-04 | End: 2023-10-04 | Stop reason: HOSPADM

## 2023-10-04 RX ORDER — MODIFIED LANOLIN
OINTMENT (GRAM) TOPICAL
Status: DISCONTINUED | OUTPATIENT
Start: 2023-10-04 | End: 2023-10-06 | Stop reason: HOSPADM

## 2023-10-04 RX ORDER — SODIUM CHLORIDE 9 MG/ML
INJECTION, SOLUTION INTRAVENOUS CONTINUOUS
Status: DISCONTINUED | OUTPATIENT
Start: 2023-10-04 | End: 2023-10-06 | Stop reason: HOSPADM

## 2023-10-04 RX ORDER — AMOXICILLIN 250 MG
2 CAPSULE ORAL 2 TIMES DAILY
Status: DISCONTINUED | OUTPATIENT
Start: 2023-10-04 | End: 2023-10-06 | Stop reason: HOSPADM

## 2023-10-04 RX ORDER — OXYTOCIN 10 [USP'U]/ML
10 INJECTION, SOLUTION INTRAMUSCULAR; INTRAVENOUS
Status: DISCONTINUED | OUTPATIENT
Start: 2023-10-04 | End: 2023-10-04 | Stop reason: HOSPADM

## 2023-10-04 RX ORDER — OXYTOCIN 10 [USP'U]/ML
10 INJECTION, SOLUTION INTRAMUSCULAR; INTRAVENOUS
Status: DISCONTINUED | OUTPATIENT
Start: 2023-10-04 | End: 2023-10-06 | Stop reason: HOSPADM

## 2023-10-04 RX ORDER — DEXTROSE, SODIUM CHLORIDE, SODIUM LACTATE, POTASSIUM CHLORIDE, AND CALCIUM CHLORIDE 5; .6; .31; .03; .02 G/100ML; G/100ML; G/100ML; G/100ML; G/100ML
INJECTION, SOLUTION INTRAVENOUS CONTINUOUS
Status: DISCONTINUED | OUTPATIENT
Start: 2023-10-04 | End: 2023-10-06 | Stop reason: HOSPADM

## 2023-10-04 RX ORDER — CEFAZOLIN SODIUM/WATER 2 G/20 ML
2 SYRINGE (ML) INTRAVENOUS SEE ADMIN INSTRUCTIONS
Status: DISCONTINUED | OUTPATIENT
Start: 2023-10-04 | End: 2023-10-04 | Stop reason: HOSPADM

## 2023-10-04 RX ADMIN — Medication: at 16:24

## 2023-10-04 RX ADMIN — KETOROLAC TROMETHAMINE 30 MG: 30 INJECTION INTRAMUSCULAR; INTRAVENOUS at 07:02

## 2023-10-04 RX ADMIN — ACETAMINOPHEN 975 MG: 325 TABLET, FILM COATED ORAL at 05:00

## 2023-10-04 RX ADMIN — ONDANSETRON 4 MG: 2 INJECTION INTRAMUSCULAR; INTRAVENOUS at 05:14

## 2023-10-04 RX ADMIN — SODIUM CHLORIDE, POTASSIUM CHLORIDE, SODIUM LACTATE AND CALCIUM CHLORIDE: 600; 310; 30; 20 INJECTION, SOLUTION INTRAVENOUS at 05:05

## 2023-10-04 RX ADMIN — ACETAMINOPHEN 975 MG: 325 TABLET, FILM COATED ORAL at 23:44

## 2023-10-04 RX ADMIN — ACETAMINOPHEN 975 MG: 325 TABLET, FILM COATED ORAL at 11:05

## 2023-10-04 RX ADMIN — Medication 600 ML/HR: at 05:27

## 2023-10-04 RX ADMIN — SODIUM CHLORIDE, POTASSIUM CHLORIDE, SODIUM LACTATE AND CALCIUM CHLORIDE: 600; 310; 30; 20 INJECTION, SOLUTION INTRAVENOUS at 06:31

## 2023-10-04 RX ADMIN — ACETAMINOPHEN 975 MG: 325 TABLET, FILM COATED ORAL at 17:08

## 2023-10-04 RX ADMIN — Medication 2 G: at 05:13

## 2023-10-04 RX ADMIN — Medication 50 MCG/MIN: at 05:13

## 2023-10-04 RX ADMIN — MORPHINE SULFATE 2 MG: 1 INJECTION EPIDURAL; INTRATHECAL; INTRAVENOUS at 06:10

## 2023-10-04 RX ADMIN — LEVOTHYROXINE SODIUM 50 MCG: 50 TABLET ORAL at 11:06

## 2023-10-04 RX ADMIN — SODIUM CITRATE AND CITRIC ACID MONOHYDRATE 30 ML: 500; 334 SOLUTION ORAL at 04:59

## 2023-10-04 RX ADMIN — INSULIN HUMAN 1 UNITS/HR: 1 INJECTION, SOLUTION INTRAVENOUS at 08:09

## 2023-10-04 RX ADMIN — MAGNESIUM SULFATE HEPTAHYDRATE 2 G/HR: 40 INJECTION, SOLUTION INTRAVENOUS at 08:39

## 2023-10-04 RX ADMIN — SENNOSIDES AND DOCUSATE SODIUM 2 TABLET: 50; 8.6 TABLET ORAL at 20:13

## 2023-10-04 RX ADMIN — Medication 500 MG: at 05:13

## 2023-10-04 ASSESSMENT — ACTIVITIES OF DAILY LIVING (ADL)
ADLS_ACUITY_SCORE: 28
ADLS_ACUITY_SCORE: 20
ADLS_ACUITY_SCORE: 28
ADLS_ACUITY_SCORE: 20
ADLS_ACUITY_SCORE: 20
ADLS_ACUITY_SCORE: 28
ADLS_ACUITY_SCORE: 28
ADLS_ACUITY_SCORE: 20
ADLS_ACUITY_SCORE: 28
ADLS_ACUITY_SCORE: 20
ADLS_ACUITY_SCORE: 28
ADLS_ACUITY_SCORE: 28

## 2023-10-04 NOTE — PROVIDER NOTIFICATION
10/04/23 1120   Provider Notification   Provider Name/Title Duc WATERS (endocrine)   Method of Notification Electronic Page   Notification Reason Status Update  (Pt currently on insulin drip. Pt ready to start eating. Pt wondering if she can switch over to her ambulatory pump now? Thanks!)

## 2023-10-04 NOTE — PLAN OF CARE
Data: Lauren Goodwin transferred to 71 via zoom cart at 0905. Baby transferred via parent's arms with FOB in attendance.  Action: Receiving unit notified of transfer: Yes. Patient and family notified of room change. Report given to [place] RN at 0826 with updates at the bedside. Belongings sent to receiving unit. Accompanied by Registered Nurse. Oriented patient to surroundings. Call light within reach. ID bands double-checked with receiving RN. IV infusions verified with [facility] RN.  Response: Patient tolerated transfer and is stable.

## 2023-10-04 NOTE — PROVIDER NOTIFICATION
10/04/23 0108   Critical Test Results/Notification   Critical Lab Result (Lab Name and Value) Magnesium level 7.6   What Time Did The Lab Notify You? 0109   Provider Notified yes   Date of Provider Notification 10/04/23   Time of Provider Notification 0110   Mechanism of Provider notification telephone   What Provider Did You Notify? Dr. Milton   Response other (see comment)  (Magnesium level within theraputic level)

## 2023-10-04 NOTE — PLAN OF CARE
Patient arrived to [facility] unit via zoom cart at 0905 ,with belongings, accompanied by spouse/ significant other, with infant in arms. Received report from  Sarah PRO  and checked bands. Unit and room orientation started. Call light given and within arms reach; no concerns present at this time. Continue with plan of care.

## 2023-10-04 NOTE — ANESTHESIA POSTPROCEDURE EVALUATION
Patient: Lauren Goodwin    Procedure: Procedure(s):   section       Anesthesia Type:  Epidural    Note:  Disposition: Inpatient   Postop Pain Control: Uneventful            Sign Out: Well controlled pain   PONV: No   Neuro/Psych: Uneventful            Sign Out: Acceptable/Baseline neuro status   Airway/Respiratory: Uneventful            Sign Out: Acceptable/Baseline resp. status   CV/Hemodynamics: Uneventful            Sign Out: Acceptable CV status; No obvious hypovolemia; No obvious fluid overload   Other NRE: NONE   DID A NON-ROUTINE EVENT OCCUR? No     Epidural-to- Updated ASA: 3      Last vitals:  Vitals Value Taken Time   /74 10/04/23 0815   Temp 37.1  C (98.8  F) 10/04/23 07   Pulse 81 10/04/23 0828   Resp 16 10/04/23 07   SpO2 93 % 10/04/23 0828   Vitals shown include unvalidated device data.    Electronically Signed By: Raymond Marquez MD  2023  8:29 AM

## 2023-10-04 NOTE — PROGRESS NOTES
[facility] PROGRESS NOTE:   October 4, 2023   4:26 AM         SUBJECTIVE:   Patient c/o fatigue.    Contractions:  q 3-5 minutes  Leakage of fluid:  Yes: meconium  Vaginal bleeding:  No  Pain controlled:  Yes           OBJECTIVE:     Vitals:    10/03/23 2354 10/04/23 0212 10/04/23 0405 10/04/23 0406   BP: 133/81 135/78 119/71 115/68   BP Location:  Left arm Left arm Left arm   Patient Position:   Sitting Sitting   Cuff Size:       Pulse:       Resp: 17 16 15    Temp: 98.9  F (37.2  C) 98.4  F (36.9  C) 98.4  F (36.9  C)    TempSrc: Oral Oral Oral    SpO2:       Weight:       Height:             NST:  Fetal Heart Rate Tracing:   Category 2  Baseline: 135  Variability: Minimal  Variable decels. Currently no accels.     Tocometer: q 3-5 minutes, pitocin to 18 mu/min, then reduced to 9 mu/min 0400    Abdomen:  gravid, NT  Cervix:   Dilation: 7   Effacement: 90%   Station:0   Consistency: average   Position: Mid  Per Kendrick Milton MD         LABS:     Recent Results (from the past 12 hour(s))   Glucose by meter    Collection Time: 10/03/23  4:39 PM   Result Value Ref Range    GLUCOSE BY METER POCT 125 (H) 70 - 99 mg/dL   Glucose by meter    Collection Time: 10/03/23  5:57 PM   Result Value Ref Range    GLUCOSE BY METER POCT 108 (H) 70 - 99 mg/dL   Glucose by meter    Collection Time: 10/03/23  6:56 PM   Result Value Ref Range    GLUCOSE BY METER POCT 107 (H) 70 - 99 mg/dL   ALT    Collection Time: 10/03/23  7:04 PM   Result Value Ref Range    ALT 11 0 - 50 U/L   AST    Collection Time: 10/03/23  7:04 PM   Result Value Ref Range    AST 31 0 - 45 U/L   Creatinine    Collection Time: 10/03/23  7:04 PM   Result Value Ref Range    Creatinine 0.99 (H) 0.51 - 0.95 mg/dL    GFR Estimate 77 >60 mL/min/1.73m2   Hemoglobin    Collection Time: 10/03/23  7:04 PM   Result Value Ref Range    Hemoglobin 13.4 11.7 - 15.7 g/dL   Platelet count    Collection Time: 10/03/23  7:04 PM   Result Value Ref Range    Platelet  Count 204 150 - 450 10e3/uL   Glucose by meter    Collection Time: 10/03/23  8:10 PM   Result Value Ref Range    GLUCOSE BY METER POCT 121 (H) 70 - 99 mg/dL   Glucose by meter    Collection Time: 10/03/23  9:03 PM   Result Value Ref Range    GLUCOSE BY METER POCT 129 (H) 70 - 99 mg/dL   Glucose by meter    Collection Time: 10/03/23 10:21 PM   Result Value Ref Range    GLUCOSE BY METER POCT 130 (H) 70 - 99 mg/dL   Glucose by meter    Collection Time: 10/03/23 10:52 PM   Result Value Ref Range    GLUCOSE BY METER POCT 127 (H) 70 - 99 mg/dL   ALT    Collection Time: 10/03/23 11:24 PM   Result Value Ref Range    ALT 10 0 - 50 U/L   AST    Collection Time: 10/03/23 11:24 PM   Result Value Ref Range    AST 30 0 - 45 U/L   Creatinine    Collection Time: 10/03/23 11:24 PM   Result Value Ref Range    Creatinine 0.99 (H) 0.51 - 0.95 mg/dL    GFR Estimate 77 >60 mL/min/1.73m2   CBC with platelets    Collection Time: 10/03/23 11:24 PM   Result Value Ref Range    WBC Count 14.8 (H) 4.0 - 11.0 10e3/uL    RBC Count 4.37 3.80 - 5.20 10e6/uL    Hemoglobin 13.0 11.7 - 15.7 g/dL    Hematocrit 39.3 35.0 - 47.0 %    MCV 90 78 - 100 fL    MCH 29.7 26.5 - 33.0 pg    MCHC 33.1 31.5 - 36.5 g/dL    RDW 13.2 10.0 - 15.0 %    Platelet Count 177 150 - 450 10e3/uL   Ketone Beta-Hydroxybutyrate Quantitative    Collection Time: 10/03/23 11:24 PM   Result Value Ref Range    Ketone (Beta-Hydroxybutyrate) Quantitative 0.90 (H) <=0.30 mmol/L   Magnesium    Collection Time: 10/03/23 11:24 PM   Result Value Ref Range    Magnesium 7.6 (HH) 1.7 - 2.3 mg/dL   Glucose by meter    Collection Time: 10/03/23 11:52 PM   Result Value Ref Range    GLUCOSE BY METER POCT 133 (H) 70 - 99 mg/dL   Glucose by meter    Collection Time: 10/04/23  1:27 AM   Result Value Ref Range    GLUCOSE BY METER POCT 131 (H) 70 - 99 mg/dL   Glucose by meter    Collection Time: 10/04/23  2:11 AM   Result Value Ref Range    GLUCOSE BY METER POCT 134 (H) 70 - 99 mg/dL   Glucose by meter     Collection Time: 10/04/23  3:10 AM   Result Value Ref Range    GLUCOSE BY METER POCT 125 (H) 70 - 99 mg/dL   Glucose by meter    Collection Time: 10/04/23  4:07 AM   Result Value Ref Range    GLUCOSE BY METER POCT 121 (H) 70 - 99 mg/dL              ASSESSMENT/PLAN:   A/P: 33 year old  at 36w3d, here for induction of labor in the setting of preE with severe features.     Induction of Labor  - Incidental AROM with mata placement (10/3 0126)  - s/p PO miso x2> now on pitocin. IUPC in place and has been difficult to achieve adequate MVU. Now with persistent variable decelerations and minimal variability. Discussed options to stop pitocin, take a break and try restart vs proceed with . Patient feeling ready for  at this time. There has been no cervical change for over 5 hours. No improvement in variability or decelerations with intra-uterine resuscitation for 30 minutes  - Consent held, will proceed with  section for category 2 tracing.   - GBS neg, no abx  - PPH meds: no methergine  - S/p discussion re: SD precautions     Preeclampsia with Severe Features  - BP labile; one nonsustained severe range at 2254, then again normotensive.  - s/p IV labetalol 10/2 2244 and 10/3 0053, 1543  - IV antihypertensives prn for sustained severe range BP  - HELLP labs nl 10/2 on admission; Cr 0.78> 0.99 on PM labs today     Type 1 DM  - Has home pump per home settings  - Endocrine following, recommendations in for postpartum pump settings  - Q1h BG, now on active labor management with gtt. Elevated BG in high 120s, low 130s, receiving insulin per protocol.   - Ketones mildly elevated, will get fluid bolus.      Hypothyroid  - Continue PTA synthroid    Lula Mendez MD

## 2023-10-04 NOTE — PROGRESS NOTES
"Labor Progress Note    S: comfortable with epidural. No headache, vision changes, SOB, chest pain, RUQ or epigastric pain. Just feels \"heavy.\"    BP (!) 164/87   Pulse 88   Temp 99  F (37.2  C) (Oral)   Resp 17   Ht 1.626 m (5' 4\")   Wt (P) 97.9 kg (215 lb 12.8 oz)   LMP 2023   SpO2 100%   BMI (P) 37.04 kg/m        SVE:     6-7/80/-2     FHT:     Baseline 130, mod variability, + accelerations, no decelerations  [place]:    2-3 in ten minutes        A/P: 33 year old  at 36w3d, here for induction of labor in the setting of preE with severe features.     Induction of Labor  - Incidental AROM with mata placement (10/3 0126)  - s/p PO miso x2> now on pitocin 10 mu/min. IUPC placed, will titrate pitocin accordingly  - GBS neg, no abx  - PPH meds: no methergine  - S/p discussion re: SD precautions     Preeclampsia with Severe Features  - BP now  mild range  - s/p IV labetalol 10/2 2244 and 10/3 0053, 1543  - IV antihypertensives for sustained severe range BP  - HELLP labs nl 10/2 on admission; Cr 0.78> 0.99 on PM labs today, will repeat at 0000     Type 1 DM  - Has home pump per home settings  - Endocrine following, recommendations in for postpartum pump settings  - Q1h BG, switching now to active labor insulin gtt     Hypothyroid  - Continue PTA synthroid    Kendrick Milton MD  OB/GYN PGY-2  10/03/2023 11:05 PM      "

## 2023-10-04 NOTE — OP NOTE
[facility]  Operative Note     Surgery Date:  10/4/2023    Surgeon:  Lula Mendez MD    Assistants:  Kendrick Milton MD, PGY-2    Chelsie Nielson MS3    Pre-op Diagnosis:    - Intrauterine pregnancy at 36w4d  - Category 2 FHT remote from delivery   - Preeclampsia with severe features  - Type 1 diabetes mellitus  - Polyhydramnios  - Suspected LGA infant    Post-op Diagnosis:    - Same, now delivered  - Postpartum hemorrhage  - Occiput posterior fetal presentation    Procedure:  Primary low-transverse  section with single layer uterine closure via Pfannenstiel incision    Anesthesia: Epidural  EBL:  1016 mL  IVF:  1100 mL crystalloid  UOP:  150 mL clear urine at the end of the case  Drains: Barney Catheter   Specimens:  Cord blood, placenta    Indications:   Lauren Goodwin is a 33 year old  at 36w4d admitted for induction of labor in the setting of preeclampsia with severe features. Pregnancy was complicated by type 1 DM, previous diagnosis of preeclampsia without severe features, polyhydramnios, suspected LGA infant, hypothyroidism, and concern for fetal pyelectasis. Her labor was complicated by category 2 FHT remote from delivery and dysfunctional labor with inadequate and infrequent contractions. In the setting of worsening preeclampsia, category 2 FHT, and minimal progress,  section was recommended. The risks, benefits, and alternatives of  section were discussed with the patient, and she agreed to proceed. Written informed consent was signed, including consent for blood transfusions.    Findings:   - No rectofascial or intraabdominal adhesions.  - Thick, meconium stained amniotic fluid  - Liveborn male infant in ROP presentation. Apgars 7 at 1 minute & 8 at 5 minutes. Weight 3459 g.  - Arterial cord pH and base deficit unable to be collected. Venous cord pH 7.09, base deficit 7.7.  - Normal uterus, fallopian tubes, and ovaries.     Procedure Details:   The  patient was brought to the operating room, where adequate epidural anesthesia was rebolused. She received 2g Ancef and 500 mg Azithromycin for antibiotic prophylaxis. She was placed in the dorsal supine position with a slight leftward tilt. She was prepped and draped in the usual sterile fashion. A surgical time out was performed. A pfannenstiel skin incision was made with the scalpel and carried down to the underlying fascia with sharp and blunt dissection. The fascia was incised in the midline, and the incision was sharply extended bilaterally. The superior aspect of the fascia was grasped and dissected off of the underlying rectus muscles with blunt and sharp dissection. The inferior aspect of the fascia was similarly dissected off of the underlying rectus muscles. The rectus muscles were  in the midline, the peritoneum was entered bluntly, and the opening was extended with digital pressure. The bladder blade was placed. A transverse hysterotomy was made with the scalpel in the lower uterine segment, and the incision was extended with digital pressure. The infant was noted to be in the ROP position, and was delivered atraumatically. The shoulders delivered easily.  The cord was doubly clamped and cut after >60 seconds of pulsation, and the infant was handed off to the awaiting nursery staff. A segment of cord was cut and cord blood was obtained. The placenta was delivered with gentle traction on the umbilical cord and uterine massage. The uterus was cleared of all clots and debris. Uterine tone was noted to be adequate. The hysterotomy was closed with a running locked suture of 0 Vicryl and noted to be hemostatic.     The pericolic gutters were cleared of all clots and debris. The hysterotomy was reexamined and noted to be hemostatic. The fascia and rectus muscles were examined and areas of oozing were controlled with electrocautery. The fascia was closed with a running 0 Vicryl suture. The subcutaneous  tissue was irrigated and areas of oozing were controlled with electrocautery. The subcutaneous tissue was less than 2 cm in thickness, and was therefore not closed. The skin was closed with 4-0 Vicryl and covered with steri strips and a sterile dressing.    All sponge, needle, and instrument counts were correct. The patient tolerated the procedure well, and was transferred to recovery in stable condition. Dr. Mendez was present for cardoza portions of the procedure.     Complications: None apparent    Kendrick Milton MD  OB/GYN PGY-2  10/04/2023 4:52 AM      Physician Attestation   I was present for the entire procedure between opening and closing.    Lula Mendez MD  Date of Service (when I saw the patient): 10/04/23

## 2023-10-04 NOTE — PROGRESS NOTES
"Labor Progress Note    S: In to assess patient due to two repeated variables. Still feeling okay.    /81   Pulse 88   Temp 98.9  F (37.2  C) (Oral)   Resp 17   Ht 1.626 m (5' 4\")   Wt (P) 97.9 kg (215 lb 12.8 oz)   LMP 2023   SpO2 100%   BMI (P) 37.04 kg/m        SVE:     /0     FHT:     Baseline 135, mod variability, + accelerations, no decelerations  [place]:    2 contractions in ten minutes    Ext: 2+ pitting edema up distal lower extremities to knee. 3+ reflexes in bilateral brachioradialis, biceps, and patella bilaterally, 1 beat clonus bilaterally        A/P: 33 year old  at 36w3d, here for induction of labor in the setting of preE with severe features.     Induction of Labor  - Incidental AROM with mata placement (10/3 0126)  - s/p PO miso x2> now on pitocin 12 mu/min. IUPC in place, not yet adequate; will continue to titrate up  - GBS neg, no abx  - PPH meds: no methergine  - S/p discussion re: SD precautions     Preeclampsia with Severe Features  - BP labile; one nonsustained severe range at 2254, then again normotensive.  - s/p IV labetalol 10/2 2244 and 10/3 0053, 1543  - IV antihypertensives for sustained severe range BP  - HELLP labs nl 10/2 on admission; Cr 0.78> 0.99 on PM labs today, repeat pending     Type 1 DM  - Has home pump per home settings  - Endocrine following, recommendations in for postpartum pump settings  - Q1h BG, now on active labor management with gtt. Elevated BG in high 120s, low 130s, receiving insulin per protocol. Did not decrease appropriately with insulin so increased to 1.5 unit(s)/h.     Hypothyroid  - Continue PTA synthroid    Kendrick Milton MD  OB/GYN PGY-2  10/04/2023 12:26 AM      "

## 2023-10-04 NOTE — DISCHARGE SUMMARY
[facility] Discharge Summary    Lauren Goodwin MRN# [id number]   Age: 33 year old YOB: 1990     Date of Admission:  10/2/2023  Date of Discharge:  10/6/2023  Admitting Physician:  Shawna Talamantes MD  Discharge Physician:  Katelin Pleitez MD             Admission Diagnoses:    at 36w4d  Pre-eclampsia with severe features based on blood pressures  Type I Diabetes  Hypothyroidism   Polyhydramnios  Suspected fetal macrosomia, EFW >99%          Discharge Diagnosis:     Same as above, now , delivered   Postpartum hemorrhage  Acute blood loss anemia          Procedures:     Procedure(s): primary low transverse  section with single layer closure via Pfannenstiel skin incision  Epidural anesthesia  Insulin gtt                Medications Prior to Admission:     Medications Prior to Admission   Medication Sig Dispense Refill Last Dose    insulin lispro (ADMELOG SOLOSTAR U-100 INSULIN) 100 unit/mL pen [INSULIN LISPRO (ADMELOG SOLOSTAR U-100 INSULIN) 100 UNIT/ML PEN] Use up to 65 units daily in insulin pump; please dispense vials, not pens 60 mL 0 10/2/2023    levothyroxine (SYNTHROID/LEVOTHROID) 50 MCG tablet Take 50 mcg by mouth daily   10/2/2023    Prenatal Vit-Fe Fumarate-FA (PRENATAL MULTIVITAMIN  PLUS IRON) 27-1 MG TABS Take 1 tablet by mouth daily       blood glucose test (CONTOUR NEXT TEST STRIPS) strips [BLOOD GLUCOSE TEST (CONTOUR NEXT TEST STRIPS) STRIPS] Test up to 6 times daily 200 strip 5     norethindrone-ethinyl estradiol (MICROGESTIN 1/20) 1-20 mg-mcg per tablet [NORETHINDRONE-ETHINYL ESTRADIOL (MICROGESTIN 1/20) 1-20 MG-MCG PER TABLET] Take 1 tablet by mouth daily. 3 Package 4     NOVOLOG U-100 INSULIN ASPART 100 unit/mL injection [NOVOLOG U-100 INSULIN ASPART 100 UNIT/ML INJECTION] Use in insulin pump up to 60 units daily 10 mL 11     [DISCONTINUED] aspirin (ASA) 81 MG chewable tablet Take 162 mg by mouth daily                Discharge Medications:        Review of your  medicines        START taking        Dose / Directions   acetaminophen 325 MG tablet  Commonly known as: TYLENOL  Used for: S/P  section      Dose: 650 mg  Take 2 tablets (650 mg) by mouth every 6 hours as needed for mild pain Start after Delivery.  Quantity: 100 tablet  Refills: 0     ferrous sulfate 325 (65 Fe) MG EC tablet  Commonly known as: FE TABS  Used for: S/P  section      Dose: 325 mg  Take 1 tablet (325 mg) by mouth every other day  Quantity: 90 tablet  Refills: 1     ibuprofen 600 MG tablet  Commonly known as: ADVIL/MOTRIN  Used for: S/P  section      Dose: 600 mg  Take 1 tablet (600 mg) by mouth every 6 hours as needed for moderate pain Start after delivery  Quantity: 60 tablet  Refills: 0     senna-docusate 8.6-50 MG tablet  Commonly known as: SENOKOT-S/PERICOLACE  Used for: S/P  section      Dose: 1 tablet  Take 1 tablet by mouth daily Start after delivery.  Quantity: 100 tablet  Refills: 0            CONTINUE these medicines which have NOT CHANGED        Dose / Directions   BLOOD GLUCOSE TEST STRIPS Strp  Used for: Type 1 diabetes mellitus without complication (H)      [BLOOD GLUCOSE TEST (CONTOUR NEXT TEST STRIPS) STRIPS] Test up to 6 times daily  Quantity: 200 strip  Refills: 5     insulin lispro 100 UNIT/ML pen  Commonly known as: HumaLOG PEN  Used for: Type 1 diabetes mellitus without complication (H)      [INSULIN LISPRO (ADMELOG SOLOSTAR U-100 INSULIN) 100 UNIT/ML PEN] Use up to 65 units daily in insulin pump; please dispense vials, not pens  Quantity: 60 mL  Refills: 0     levothyroxine 50 MCG tablet  Commonly known as: SYNTHROID/LEVOTHROID  Indication: Underactive Thyroid      Dose: 50 mcg  Take 50 mcg by mouth daily  Refills: 0     norethindrone-ethinyl estradiol 1-20 MG-MCG tablet  Commonly known as: MICROGESTIN 1/20  Used for: Encounter for contraceptive management, unspecified type      Dose: 1 tablet  [NORETHINDRONE-ETHINYL ESTRADIOL (MICROGESTIN 1/20)  1-20 MG-MCG PER TABLET] Take 1 tablet by mouth daily.  Quantity: 3 Package  Refills: 4     NovoLOG VIAL 100 UNITS/ML vial  Used for: Type 1 diabetes mellitus without complication (H)  Generic drug: insulin aspart      [NOVOLOG U-100 INSULIN ASPART 100 UNIT/ML INJECTION] Use in insulin pump up to 60 units daily  Quantity: 10 mL  Refills: 11     prenatal multivitamin  plus iron 27-1 MG Tabs      Dose: 1 tablet  Take 1 tablet by mouth daily  Refills: 0            STOP taking      aspirin 81 MG chewable tablet  Commonly known as: ASA                  Where to get your medicines        These medications were sent to [facility] [place], [place] - [address]  [address] [place] [address], [facility] [address]      Phone: [phone]   acetaminophen 325 MG tablet  ferrous sulfate 325 (65 Fe) MG EC tablet  ibuprofen 600 MG tablet  senna-docusate 8.6-50 MG tablet               Consultations:   Anesthesia           Brief Admission History:   Ms. Lauren Goodwin is a 33 year old now P1 who initially presented at 36w4d for newly diagnosed pre-eclampsia with severe features based on admission blood pressures. She was diverted from [facility]. She underwent IOL on admission. She was treated with magnesium sulfate for seizure prophylaxis. She required multiple doses of IV labetalol for severe range BPs and her creatine elevated to 1.17. Ultimately had C section due to category II FHT remote from delivery in the setting of inadequate contractions despite increasing pitocin and concern for worsening pre-eclampsia.        Intraoperative course   The procedure was uncomplicated.  EBL 1016 mL.  See operative report for details.     - No rectofascial or intraabdominal adhesions.  - Thick, meconium stained amniotic fluid  - Liveborn male infant in ROP presentation. Apgars 7 at 1 minute & 8 at 5 minutes. Weight 3459 g.  - Arterial cord pH and base deficit unable to be collected. Venous cord pH 7.09, base deficit 7.7.  - Normal  uterus, fallopian tubes, and ovaries.           Postpartum Course   The patient's hospital course was unremarkable. She transitioned her insulin pump settings to the recommended settings from endocrine for postpartum care. She was continued on IV magnesium for 19 hours postpartum, it was stopped early due to supra therapeutic level. Her blood pressures were normotensive to mild range in the postpartum period.     From a postoperative standpoint, she recovered as anticipated and experienced no post-operative complications. On discharge, her pain was well controlled. Vaginal bleeding is similar to peak menstrual flow.  Voiding without difficulty.  Ambulating well and tolerating a normal diet.  No fever or significant wound drainage.  Breastfeeding well.  Infant is stable.  No bowel movement yet.  She was discharged on post-partum day #2.     Latest Reference Range & Units 10/03/23 19:04 10/03/23 23:24 10/04/23 07:40 10/04/23 21:36 10/05/23 07:56   Hemoglobin 11.7 - 15.7 g/dL 13.4 13.0 11.3 (L) 8.9 (L) 8.9 (L)   Hematocrit 35.0 - 47.0 %  39.3      Platelet Count 150 - 450 10e3/uL 204 177 168 171    (L): Data is abnormally low     Latest Reference Range & Units 10/03/23 07:34 10/03/23 19:04 10/03/23 23:24 10/04/23 07:40 10/04/23 11:00 10/04/23 21:36 10/05/23 07:56   Creatinine 0.51 - 0.95 mg/dL 0.78 0.99 (H) 0.99 (H) 1.15 (H) 1.16 (H)  1.16 (H) 1.17 (H) 0.99 (H)   (H): Data is abnormally high      Latest Reference Range & Units 10/03/23 07:34 10/03/23 19:04 10/03/23 23:24 10/04/23 07:40 10/04/23 21:36   ALT 0 - 50 U/L 9 11 10 9 5   AST 0 - 45 U/L 27 31 30 32 22          Discharge Instructions and Follow-Up:     Discharge diet: Regular   Discharge activity: No lifting greater than 20 lbs, pushing, pulling, or other strenuous activity for 6 weeks. Pelvic rest for 6 weeks including no sexual intercourse, tampons, or douching. No driving until you can slam on the brakes without pain or while on narcotic pain medications.     Discharge follow-up: Follow up with primary OB for routine postpartum visit in 6 weeks  Blood pressure check within 1 week following leaving hospital.   Wound care: Keep incision clean and dry           Discharge Disposition:     Discharged to home in stable condition      Philip Jaquez MD, MPH, MS  Obstetrics, Gynecology & Women's Health   Resident, PGY-2  10/06/2023 10:41 AM        Physician Attestation   I saw and evaluated this patient prior to discharge.  I discussed the patient with the resident/fellow and agree with plan of care as documented in the note.      I personally reviewed vital signs, medications, labs, and exam .    I personally spent 20 minutes on discharge activities.    Katelin Marrero MD  Date of Service (when I saw the patient): 10/06/23

## 2023-10-04 NOTE — ANESTHESIA CARE TRANSFER NOTE
Patient: Lauren Goodwin    Procedure: Procedure(s):   section       Diagnosis: * No pre-op diagnosis entered *  Diagnosis Additional Information: No value filed.    Anesthesia Type:   Epidural     Note:    Oropharynx: oropharynx clear of all foreign objects and spontaneously breathing  Level of Consciousness: awake  Oxygen Supplementation: room air    Independent Airway: airway patency satisfactory and stable  Dentition: dentition unchanged  Vital Signs Stable: post-procedure vital signs reviewed and stable  Report to RN Given: handoff report given  Patient transferred to: Labor and Delivery    Handoff Report: Identifed the Patient, Identified the Reponsible Provider, Reviewed the pertinent medical history, Discussed the surgical course, Reviewed Intra-OP anesthesia mangement and issues during anesthesia, Set expectations for post-procedure period and Allowed opportunity for questions and acknowledgement of understanding      Vitals:  Vitals Value Taken Time   BP 93/74 10/04/23 0641   Temp     Pulse 74 10/04/23 0644   Resp     SpO2 98 % 10/04/23 0644   Vitals shown include unvalidated device data.    Electronically Signed By: Aayush Hinton MD  2023  6:45 AM

## 2023-10-04 NOTE — PROGRESS NOTES
IP Diabetes Management  Daily Note         HPI: Lauren is a 33 year old female with a PMHx  at 36w3d with a past medical history of type 1 diabetes (noted to be within good control this pregnancy)  and hypothyroidism who was admitted on 10/2/2023 for IOL- noted concerns for pre-eclampsia.      Lauren is currently postpartum- transferred to [facility]    Assessment:   1.Type 1 diabetes now in post partum period with good control     Plan:    -Switch high intensity OB algorithm to regular adult- may consider transition back to insulin pump this afternoon   -Avoid long prolonged periods of time off of IV insulin- patient has type 1 diabetes and doing so could cause life threatening DKA   -Start Novolog 1:12 CHO coverage with meals and snacks/supplements   -BG monitoring per insulin drip protocol   -hypoglycemia protocol   -carb counting protocol    Update:  Plan to transition back to ambulatory insulin pump: Tandem T-slim running in control IQ  Basal rates:  Rate #1: 0.7 units/hr 1562-4100  Rate #2 : 0.75 units/hr 8923-7589  Rate #3: 0.45 units/hr 4239-2222  Rate #4: 0.15 units/hr 7852-9398  Rate #5: 0.9 units/hr 8549-3214     Sensitivity or correction factor: decrease to 50  Carb ratio: decrease to 1:12    Discharge Planning:    -Tentative diabetic regimen: back on insulin pump   -diabetes education needs: CDE consult in for insulin pump assessment- likely no other needs  -Outpatient follow up: Anali Lehman MD- [facility]   -Test claim: none needed at this time    Plan discussed with patient, bedside RN,   primary team- via this note.      Interval History and Assessment: interval glucose trend reviewed:   BG escalated during labor to 130's and patient was started of IV insulin.  Orders placed to transition to regular adult algorithm for IV insulin now that postpartum- added CHO coverage as well.     Update:  Needs decreasing on insulin drip- likely some escalation due to stress immediately post surgery  "    Currently, denies nausea or vomiting. Patient notes she is very tired    Labs:10/2  Bicarb:10  Creatinine: 0.82  eGFR: >90  Anion Gap: 10    Current nutritional intake and type: Orders Placed This Encounter      Advance Diet as Tolerated: Regular Diet Adult      Planned Procedures/surgeries:   Steroid planning: none  D5W-containing solutions/medications: order for d5% but not currently running    PTA Diabetes Regimen:   Tandem Insulin pump- control IQ  Basal Rates and Start/End times:   Rate #1 = 1.25 units/hr from 0000 to 1100   Rate #2 = 0.75 units/hr from 1100 to 1500.   Rate #3 = 0.25 units/hr from 1500 to 1930   Rate #4 = 1.5 units/hr from 1930 to 0000.   Bolus settings:  ISF 25  CHO 1:6.5        Prepregnancy:  Basal rates:  Rate #1: 0.7 units/hr 4360-5745  Rate #2 : 1.35 units/hr 6311-4344  Rate #3: 0.85 units/hr 9073-9066  Rate #4: 0.6 units/hr 4826-2536  Rate #5: 1.4 units/hr 1930-000     ISF 35  CHO 1:10           Diabetes History:   Type of Diabetes: Type 1 Diabetes Mellitus  Lab Results   Component Value Date    A1C 5.1 10/03/2023    A1C 7.6 2019    A1C 7.8 2018    A1C 7.3 07/10/2018    A1C 8.4 2018              Review of Systems:     The Review of Systems is negative other than noted in the Interval History.           Medications:     Current Facility-Administered Medications   Medication    acetaminophen (TYLENOL) tablet 975 mg    [START ON 10/6/2023] bisacodyl (DULCOLAX) suppository 10 mg    BUPivacaine liposome (EXPAREL) LA inj was given in the infiltration site to produce post-op analgesia. Duration of action is up to 72 hours. Other \"tegan\" meds should not be given for 96 hours except for lidocaine 4% patch. This is for INFORMATION ONLY.    calcium gluconate 10 % injection 1 g    carboprost (HEMABATE) injection 250 mcg    dextrose 5% in lactated ringers infusion    glucose gel 15-30 g    Or    dextrose 50 % injection 25-50 mL    Or    glucagon injection 1 mg    [Held " by provider] enoxaparin ANTICOAGULANT (LOVENOX) injection 40 mg    hydrALAZINE (APRESOLINE) injection 10 mg    hydrocortisone (Perianal) (ANUSOL-HC) 2.5 % cream    [Held by provider] ibuprofen (ADVIL/MOTRIN) tablet 800 mg    insulin basal rate from AMBULATORY PUMP    insulin bolus from AMBULATORY PUMP    insulin bolus from AMBULATORY PUMP    insulin bolus from AMBULATORY PUMP    insulin lispro (HumaLOG) 100 UNIT/ML vial for filling pump reservoir    insulin regular (MYXREDLIN) 1 unit/mL infusion    [Held by provider] ketorolac (TORADOL) injection 30 mg    labetalol (NORMODYNE/TRANDATE) injection 20 mg    labetalol (NORMODYNE/TRANDATE) injection 20-80 mg    lactated ringers infusion    lactated ringers infusion    lanolin cream    levothyroxine (SYNTHROID/LEVOTHROID) tablet 50 mcg    lidocaine (LMX4) cream    lidocaine (LMX4) cream    lidocaine (LMX4) cream    lidocaine 1 % 0.1-1 mL    lidocaine 1 % 0.1-1 mL    lidocaine 1 % 0.1-1 mL    magnesium sulfate 2 g in 50 mL sterile water intermittent infusion    magnesium sulfate 2 g in 50 mL sterile water intermittent infusion    magnesium sulfate 4 g in 100 mL sterile water intermittent infusion    magnesium sulfate 4 g in 100 mL sterile water intermittent infusion    magnesium sulfate infusion    Med Instruction - Transition from IV Insulin Infusion to Sub-Q Insulin    methylergonovine (METHERGINE) injection 200 mcg    metoclopramide (REGLAN) injection 10 mg    Or    metoclopramide (REGLAN) tablet 10 mg    midazolam (VERSED) injection 2 mg    midazolam (VERSED) injection 2 mg    misoprostol (CYTOTEC) tablet 400 mcg    Or    misoprostol (CYTOTEC) tablet 800 mcg    naloxone (NARCAN) injection 0.2 mg    Or    naloxone (NARCAN) injection 0.4 mg    Or    naloxone (NARCAN) injection 0.2 mg    Or    naloxone (NARCAN) injection 0.4 mg    NIFEdipine (PROCARDIA) capsule 10-20 mg    No MMR Needed -  Assessment: Patient does not need MMR vaccine    No Tdap Needed - Assessment:  "Patient does not need Tdap vaccine    ondansetron (ZOFRAN ODT) ODT tab 4 mg    Or    ondansetron (ZOFRAN) injection 4 mg    oxyCODONE (ROXICODONE) tablet 5 mg    oxytocin (PITOCIN) 30 units in 500 mL 0.9% NaCl infusion    oxytocin (PITOCIN) 30 units in 500 mL 0.9% NaCl infusion    oxytocin (PITOCIN) injection 10 Units    oxytocin (PITOCIN) injection 10 Units    prochlorperazine (COMPAZINE) injection 10 mg    Or    prochlorperazine (COMPAZINE) tablet 10 mg    Or    prochlorperazine (COMPAZINE) suppository 25 mg    senna-docusate (SENOKOT-S/PERICOLACE) 8.6-50 MG per tablet 1 tablet    Or    senna-docusate (SENOKOT-S/PERICOLACE) 8.6-50 MG per tablet 2 tablet    simethicone (MYLICON) chewable tablet 80 mg    sodium chloride (PF) 0.9% PF flush 3 mL    sodium chloride (PF) 0.9% PF flush 3 mL    sodium chloride (PF) 0.9% PF flush 3 mL    sodium chloride (PF) 0.9% PF flush 3 mL    sodium chloride (PF) 0.9% PF flush 3 mL    sodium chloride (PF) 0.9% PF flush 3 mL    sodium chloride 0.9% (bottle) irrigation    sodium chloride 0.9% infusion    [START ON 10/6/2023] sodium phosphate (FLEET ENEMA) 1 enema    tranexamic acid 1 g in 100 mL NS IV bag (premix)            Physical Exam:    /77 (BP Location: Left arm, Patient Position: Supine, Cuff Size: Adult Regular)   Pulse 75   Temp 98.4  F (36.9  C) (Oral)   Resp 16   Ht 1.626 m (5' 4\")   Wt (P) 97.9 kg (215 lb 12.8 oz)   LMP 01/18/2023   SpO2 95%   Breastfeeding Unknown   BMI (P) 37.04 kg/m    General: pleasant, in no distress.   HEENT: articulate  Lungs: unlabored respiration, no cough  Mental status:  alert, oriented to self, place, time            Data:     Recent Labs   Lab 10/04/23  1357 10/04/23  1305 10/04/23  1211 10/04/23  1112 10/04/23  1100 10/04/23  1008   * 147* 144* 157* 170* 157*     Lab Results   Component Value Date    WBC 14.8 (H) 10/03/2023    WBC 10.1 10/02/2023    WBC 10.8 07/17/2021    HGB 11.3 (L) 10/04/2023    HGB 13.0 10/03/2023    " HGB 13.4 10/03/2023    HCT 39.3 10/03/2023    HCT 34.6 (L) 10/02/2023    HCT 42.8 07/17/2021    MCV 90 10/03/2023    MCV 86 10/02/2023    MCV 90 07/17/2021     10/04/2023     10/03/2023     10/03/2023     Lab Results   Component Value Date     (L) 10/04/2023     10/02/2023     07/17/2021    POTASSIUM 4.0 10/04/2023    POTASSIUM 4.0 10/02/2023    POTASSIUM 3.5 07/17/2021    CHLORIDE 101 10/04/2023    CHLORIDE 106 10/02/2023    CHLORIDE 102 07/17/2021    CO2 16 (L) 10/04/2023    CO2 21 (L) 10/02/2023    CO2 26 07/17/2021     (H) 10/04/2023     (H) 10/04/2023     (H) 10/04/2023     Lab Results   Component Value Date    BUN 18.9 10/04/2023    BUN 16.3 10/02/2023    BUN 13 07/17/2021     Lab Results   Component Value Date    TSH 5.73 (H) 07/10/2018     Lab Results   Component Value Date    AST 32 10/04/2023    AST 30 10/03/2023    AST 31 10/03/2023    ALT 9 10/04/2023    ALT 10 10/03/2023    ALT 11 10/03/2023    ALKPHOS 173 (H) 10/02/2023    ALKPHOS 65 07/17/2021       I spent a total of 60 minutes face to face or coordinating care of Lauren Goodwin.             Over 50% of my time on the unit was spent counseling the patient and/or coordinating care regarding acute hyperglycemia management.  See note for details.      Contacting the Inpatient Diabetes Team   From 7AM-5PM: page inpatient diabetes provider that is following the patient, or utilize the job code paging system. From 5PM-7AM: page the job code for endocrine fellow on call.     Please use the following job code to reach the Inpatient Diabetes team. Note that you must use an in house phone and that job codes cannot receive text pages.   Dial [phone] (or star-star-star [phone] on the new [employer] telephones), then [phone] to reach the endocrine-diabetes provider on call.    ANNEL Millard, CNP  Inpatient Diabetes Service   Pager: [phone]

## 2023-10-04 NOTE — PROGRESS NOTES
Pt brought to c/s due to failure to progress and worsening pre-eclampsia as well as fetal category II strip. NICU called to delivery. Pt has mag and insulin drip running at time of procedure. Insulin stopped during procedure due to BG 81. Plan to restart per adult insulin orders. Delivery of viable baby boy at 0527. QBL 1016.

## 2023-10-04 NOTE — PROVIDER NOTIFICATION
10/03/23 2035   Provider Notification   Provider Name/Title Dr. Chadwick Mendez   Method of Notification In Department   Request Evaluate - Remote   Notification Reason Lab/Diagnostic Study     Dr. Chadwick Mendez notified of patient's creatine increase from .78 to .99. Provider instructed RN to watch urine output closely and redraw labs as ordered.

## 2023-10-04 NOTE — PLAN OF CARE
Goal Outcome Evaluation:      Plan of Care Reviewed With: patient    Overall Patient Progress: improvingOverall Patient Progress: improving     VSS. Pt denies pain. Magnesium infusing @ 2gm/hr. Pt denies HA, vision changes or epigastric pain. +2 reflexes & no clonus bilaterally. +3 edema in legs. Pt tolerating PO fluids. Pt has had a few bites of cheese this afternoon, no carb coverage needed yet. Insulin drip infusing (algorithm 1). Spoke with endocrine NP, pt will have BMP drawn & pt can hopefully transition to own ambulatory pump later this afternoon. Barney in. Pt breastfeeding baby with staff assist. Pt napping between cares.  & mom here, both very supportive. Bonding well with baby.

## 2023-10-04 NOTE — PROGRESS NOTES
[facility] PROGRESS NOTE:   October 3, 2023   7:58 PM         SUBJECTIVE:   Patient sleeping.    Contractions:  q 3-5 minutes  Leakage of fluid:  Yes: meconium  Vaginal bleeding:  No  Pain controlled:  Yes           OBJECTIVE:     Vitals:    10/03/23 1800 10/03/23 1805 10/03/23 1835 10/03/23 1858   BP:  (!) 155/84 (!) 147/74    BP Location:       Patient Position:       Cuff Size:       Pulse: 88      Resp: 16      Temp: 97.1  F (36.2  C)   98.8  F (37.1  C)   TempSrc: Oral   Oral   SpO2: 99% 100% 98% 100%   Weight:             NST:  Fetal Heart Rate Tracing:   Baseline: 120  Variability: Moderate  No decelerations    Tocometer: q 3-5 minutes, 8 ml/min    Abdomen:  gravid, NT  Cervix:   Deferred           LABS:     Recent Results (from the past 12 hour(s))   Glucose by meter    Collection Time: 10/03/23  9:26 AM   Result Value Ref Range    GLUCOSE BY METER POCT 77 70 - 99 mg/dL   Glucose by meter    Collection Time: 10/03/23 11:40 AM   Result Value Ref Range    GLUCOSE BY METER POCT 119 (H) 70 - 99 mg/dL   Glucose by meter    Collection Time: 10/03/23  1:51 PM   Result Value Ref Range    GLUCOSE BY METER POCT 103 (H) 70 - 99 mg/dL   Glucose by meter    Collection Time: 10/03/23  2:53 PM   Result Value Ref Range    GLUCOSE BY METER POCT 111 (H) 70 - 99 mg/dL   Glucose by meter    Collection Time: 10/03/23  3:38 PM   Result Value Ref Range    GLUCOSE BY METER POCT 100 (H) 70 - 99 mg/dL   Glucose by meter    Collection Time: 10/03/23  4:39 PM   Result Value Ref Range    GLUCOSE BY METER POCT 125 (H) 70 - 99 mg/dL   Glucose by meter    Collection Time: 10/03/23  5:57 PM   Result Value Ref Range    GLUCOSE BY METER POCT 108 (H) 70 - 99 mg/dL   Glucose by meter    Collection Time: 10/03/23  6:56 PM   Result Value Ref Range    GLUCOSE BY METER POCT 107 (H) 70 - 99 mg/dL   ALT    Collection Time: 10/03/23  7:04 PM   Result Value Ref Range    ALT 11 0 - 50 U/L   AST    Collection Time: 10/03/23  7:04 PM    Result Value Ref Range    AST 31 0 - 45 U/L   Creatinine    Collection Time: 10/03/23  7:04 PM   Result Value Ref Range    Creatinine 0.99 (H) 0.51 - 0.95 mg/dL    GFR Estimate 77 >60 mL/min/1.73m2   Hemoglobin    Collection Time: 10/03/23  7:04 PM   Result Value Ref Range    Hemoglobin 13.4 11.7 - 15.7 g/dL   Platelet count    Collection Time: 10/03/23  7:04 PM   Result Value Ref Range    Platelet Count 204 150 - 450 10e3/uL              ASSESSMENT/PLAN:   33 year old  at 36w3d here for induction of labor in the setting of preE with severe features.     Induction of Labor  - Incidental AROM with mata placement (0126). Meconium on rupture of forebag.   - s/p PO miso x2> now on pitocin 8 mu/min  - GBS neg, no abx  - PPH meds: no methergine  - IUPC PRN  - S/p discussion re: SD precautions     Preeclampsia with Severe Features  - BP now  mild range  - s/p IV labetalol 10/2 2244 and 10/3 0053, 1543  - IV antihypertensives for sustained severe range BP  - HELLP labs nl 10/2 on admission     Type 1 DM  - On home pump per home settings  - Endocrine following   - Insulin gtt PRN     Hypothyroid  - Continue PTA synthroid      Lula Mendez MD

## 2023-10-04 NOTE — BRIEF OP NOTE
[facility]  Brief Op Note     Surgery Date:  10/4/2023    Surgeon:  Lula Mendez MD    Assistants:  Kendrick Milton MD, PGY-2    Chelsie Nielson MS3    Pre-op Diagnosis:    - Intrauterine pregnancy at 36w4d  - Category 2 FHT remote from delivery  - Preeclampsia with severe features  - Type 1 diabetes  - Polyhydramnios  - Suspected LGA infant     Post-op Diagnosis:    - Same, now delivered    Procedure:  Primary low-transverse  section with single layer uterine closure via Pfannenstiel incision    Anesthesia: Epidural  EBL:  1016 mL  IVF:  1100 mL crystalloid  UOP:  150 mL clear urine at the end of the case  Drains: Barney Catheter   Specimens:  Placenta, cord blood    Findings:   - No rectofascial or intraabdominal adhesions.  - Meconium stained amniotic fluid  - Liveborn male infant in ROP presentation. Apgars 7 at 1 minute & 8 at 5 minutes. Weight pending.  - Arterial cord gases unable to be collected. Venous cord pH 7.09, base deficit 7.7.  - Normal uterus, fallopian tubes, and ovaries.     Complications: None apparent    Kendrick Milton MD  OB/GYN PGY-2  10/04/2023 6:21 AM

## 2023-10-04 NOTE — ANESTHESIA PROCEDURE NOTES
"TAP Procedure Note    Pre-Procedure   Staff -        Anesthesiologist:  Tania Morris MD       Resident/Fellow: Roseline Pacheco MD       Performed By: resident       Location: OB       Pre-Anesthestic Checklist: patient identified, IV checked, site marked, risks and benefits discussed, informed consent, monitors and equipment checked, pre-op evaluation, at physician/surgeon's request and post-op pain management  Timeout:       Correct Patient: Yes        Correct Procedure: Yes        Correct Site: Yes        Correct Position: Yes        Correct Laterality: Yes        Site Marked: Yes  Procedure Documentation  Procedure: TAP       Diagnosis: POST OPERATIVE PAIN       Laterality: bilateral       Patient Position: supine       Patient Prep/Sterile Barriers: sterile gloves, mask       Skin prep: Chloraprep       Needle Type: short bevel       Needle Gauge: 21.        Needle Length (millimeters): 110        Ultrasound guided       1. Ultrasound was used to identify targeted nerve, plexus, vascular marker, or fascial plane and place a needle adjacent to it in real-time.       2. Ultrasound was used to visualize the spread of anesthetic in close proximity to the above referenced structure.       3. A permanent image is entered into the patient's record.    Assessment/Narrative         The placement was negative for: blood aspirated, painful injection and site bleeding       Paresthesias: No.       Bolus given via needle..        Secured via.        Insertion/Infusion Method: Single Shot       Complications: none       Injection made incrementally with aspirations every 5 mL.      FOR [facility] ([facility]) ONLY:   Pain Team Contact information: please page the Pain Team Via [employer]. Search \"Pain\". During daytime hours, please page the attending first. At night please page the resident first.      "

## 2023-10-05 PROBLEM — O14.10 PREECLAMPSIA, SEVERE: Status: ACTIVE | Noted: 2023-10-05

## 2023-10-05 LAB
CREAT SERPL-MCNC: 0.99 MG/DL (ref 0.51–0.95)
EGFRCR SERPLBLD CKD-EPI 2021: 77 ML/MIN/1.73M2
GLUCOSE BLDC GLUCOMTR-MCNC: 100 MG/DL (ref 70–99)
GLUCOSE BLDC GLUCOMTR-MCNC: 112 MG/DL (ref 70–99)
GLUCOSE BLDC GLUCOMTR-MCNC: 119 MG/DL (ref 70–99)
GLUCOSE BLDC GLUCOMTR-MCNC: 146 MG/DL (ref 70–99)
GLUCOSE BLDC GLUCOMTR-MCNC: 165 MG/DL (ref 70–99)
GLUCOSE BLDC GLUCOMTR-MCNC: 93 MG/DL (ref 70–99)
HGB BLD-MCNC: 8.9 G/DL (ref 11.7–15.7)
MAGNESIUM SERPL-MCNC: 6.5 MG/DL (ref 1.7–2.3)

## 2023-10-05 PROCEDURE — 82565 ASSAY OF CREATININE: CPT

## 2023-10-05 PROCEDURE — 99232 SBSQ HOSP IP/OBS MODERATE 35: CPT

## 2023-10-05 PROCEDURE — 99232 SBSQ HOSP IP/OBS MODERATE 35: CPT | Mod: GC | Performed by: OBSTETRICS & GYNECOLOGY

## 2023-10-05 PROCEDURE — 250N000011 HC RX IP 250 OP 636: Mod: JZ

## 2023-10-05 PROCEDURE — 120N000002 HC R&B MED SURG/OB UMMC

## 2023-10-05 PROCEDURE — 83735 ASSAY OF MAGNESIUM: CPT

## 2023-10-05 PROCEDURE — 85018 HEMOGLOBIN: CPT

## 2023-10-05 PROCEDURE — 36416 COLLJ CAPILLARY BLOOD SPEC: CPT

## 2023-10-05 PROCEDURE — 36415 COLL VENOUS BLD VENIPUNCTURE: CPT

## 2023-10-05 PROCEDURE — 250N000013 HC RX MED GY IP 250 OP 250 PS 637

## 2023-10-05 RX ORDER — FERROUS SULFATE 325(65) MG
325 TABLET, DELAYED RELEASE (ENTERIC COATED) ORAL EVERY OTHER DAY
Qty: 90 TABLET | Refills: 1 | Status: SHIPPED | OUTPATIENT
Start: 2023-10-05

## 2023-10-05 RX ORDER — IBUPROFEN 600 MG/1
600 TABLET, FILM COATED ORAL EVERY 6 HOURS PRN
Qty: 60 TABLET | Refills: 0 | Status: SHIPPED | OUTPATIENT
Start: 2023-10-05

## 2023-10-05 RX ORDER — ACETAMINOPHEN 325 MG/1
650 TABLET ORAL EVERY 6 HOURS PRN
Qty: 100 TABLET | Refills: 0 | Status: SHIPPED | OUTPATIENT
Start: 2023-10-05

## 2023-10-05 RX ORDER — AMOXICILLIN 250 MG
1 CAPSULE ORAL DAILY
Qty: 100 TABLET | Refills: 0 | Status: SHIPPED | OUTPATIENT
Start: 2023-10-05

## 2023-10-05 RX ADMIN — ACETAMINOPHEN 975 MG: 325 TABLET, FILM COATED ORAL at 12:48

## 2023-10-05 RX ADMIN — SENNOSIDES AND DOCUSATE SODIUM 2 TABLET: 50; 8.6 TABLET ORAL at 19:50

## 2023-10-05 RX ADMIN — IBUPROFEN 800 MG: 800 TABLET ORAL at 23:46

## 2023-10-05 RX ADMIN — LEVOTHYROXINE SODIUM 50 MCG: 50 TABLET ORAL at 08:45

## 2023-10-05 RX ADMIN — ENOXAPARIN SODIUM 40 MG: 40 INJECTION SUBCUTANEOUS at 19:50

## 2023-10-05 RX ADMIN — SIMETHICONE 80 MG: 80 TABLET, CHEWABLE ORAL at 17:04

## 2023-10-05 RX ADMIN — SENNOSIDES AND DOCUSATE SODIUM 2 TABLET: 50; 8.6 TABLET ORAL at 08:45

## 2023-10-05 RX ADMIN — IBUPROFEN 800 MG: 800 TABLET ORAL at 17:06

## 2023-10-05 RX ADMIN — ACETAMINOPHEN 975 MG: 325 TABLET, FILM COATED ORAL at 06:47

## 2023-10-05 RX ADMIN — ACETAMINOPHEN 975 MG: 325 TABLET, FILM COATED ORAL at 18:47

## 2023-10-05 RX ADMIN — SIMETHICONE 80 MG: 80 TABLET, CHEWABLE ORAL at 23:47

## 2023-10-05 RX ADMIN — IBUPROFEN 800 MG: 800 TABLET ORAL at 11:45

## 2023-10-05 ASSESSMENT — ACTIVITIES OF DAILY LIVING (ADL)
ADLS_ACUITY_SCORE: 20
ADLS_ACUITY_SCORE: 28
ADLS_ACUITY_SCORE: 20
ADLS_ACUITY_SCORE: 28
ADLS_ACUITY_SCORE: 28
ADLS_ACUITY_SCORE: 20
ADLS_ACUITY_SCORE: 28
ADLS_ACUITY_SCORE: 20

## 2023-10-05 NOTE — PLAN OF CARE
Vitals & PP assessments WDL. Lungs clear.   Voiding large amounts.   Pt denies HA, visual disturbances & URQ discomfort.   Up ad norman & showered. Distended, Passing flatus.    Breast feeding with staff assist.   Will continue on plan of cares.       Outcome Evaluation: Improving

## 2023-10-05 NOTE — PROGRESS NOTES
[facility]   Post-partum Progress Note    Name:  Lauren Goodwin  MRN: [id number]    S:   Patient reports feeling well overall, much better since stopping magnesium.  Pain well controlled.  Has been able to eat a small amount, no nausea or vomiting. Did report some lightheadedness with ambulation yesterday, has not yet gotten up this morning.  Has not voided spontaneously, mata catheter in place. Has not passed flatus. Lochia similar to menstrual flow. Denies fever/chills, headache, vision changes, chest pain, shortness of breath, RUQ pain,  Breast feeding.  Desires oral contraceptives for contraception.      O:   Patient Vitals for the past 24 hrs:   BP Temp Temp src Pulse Resp SpO2 Weight   10/05/23 0700 135/84 98.2  F (36.8  C) Oral 82 16 99 % --   10/05/23 0509 -- -- -- -- -- -- 93 kg (205 lb 1.6 oz)   10/05/23 0233 116/67 98.4  F (36.9  C) Oral 72 16 97 % --   10/04/23 2256 128/75 98.1  F (36.7  C) Oral 79 16 96 % --   10/04/23 2212 127/73 98.6  F (37  C) Oral 79 16 96 % --   10/04/23 2112 132/77 98.1  F (36.7  C) Oral 77 16 97 % --   10/04/23 2007 137/79 98.5  F (36.9  C) Oral 81 16 100 % --   10/04/23 1910 (!) 140/78 98.3  F (36.8  C) Oral 80 16 96 % --   10/04/23 1812 128/76 -- -- 78 16 99 % --   10/04/23 1707 118/75 -- -- 80 16 100 % --   10/04/23 1600 126/77 98.1  F (36.7  C) Oral 77 16 95 % --   10/04/23 1500 134/77 -- -- 73 16 98 % --   10/04/23 1355 131/77 -- -- 75 16 95 % --   10/04/23 1302 108/73 -- -- 69 16 95 % --   10/04/23 1206 105/62 -- -- 68 16 97 % --   10/04/23 1105 128/74 -- -- 76 16 97 % --   10/04/23 1004 121/75 -- -- 83 16 96 % --   10/04/23 0920 125/80 98.4  F (36.9  C) Oral 85 16 96 % --   10/04/23 0845 109/65 98.5  F (36.9  C) Oral 82 16 93 % --   10/04/23 0830 112/61 -- -- 80 -- 94 % --   10/04/23 0815 117/74 -- -- 86 -- 95 % --   10/04/23 0800 116/68 -- -- 85 -- 96 % --   10/04/23 0745 126/71 -- -- 84 -- 96 % --     Gen:  Resting comfortably, NAD  CV:  RRR  Pulm:   Non-labored breathing. No cough or audible wheezing.   Abd:  Soft, appropriately tender to palpation, non-distended.  Fundus below umbilicus, firm, and non-tender.  Inc:  Bandage in place, c/d/i with minimal overlying shadowing, no surrounding erythema or edema    Hgb:   Recent Labs   Lab 10/04/23  2136 10/04/23  0740 10/03/23  2324 10/03/23  1904 10/03/23  0734   HGB 8.9* 11.3* 13.0 13.4 12.0     A/P:  33 year old  POD#1 s/p PLTCS. Pregnancy notable for preeclampsia with severe features, history of type I diabetes and hypothyroidism. Meeting postop goals. Continue with routine postpartum management.     Preeclampsia with severe features  - Blood pressures have been well controlled, 140/78 max over past 24 hours   - Magnesium elevated to 8.6 @ 2136 10/, down to 6.5 on recheck 10/5 @ 0155, will not resume given therapeutic range   - HELLP Labs Cr 0.78 > 0.99 > 1.15 > 1.16 > 1.17 > AM Cr pending, otherwise wnl    Type I Diabetes   - had elevated glucose of 190 overnight     - endocrine following  - continue to use home insulin pump per endo recommendations     Hypothyroidism   - Continue PTA levothyroxine     Routine Postpartum  Pain: Well-controlled with scheduled tylenol.  Holding NSAIDs in the setting of elevated creatinine   Hgb: 13.0 > EBL 1016 > 11.6 > 8.9 > AM Hgb pending. VSS as noted above, asymptomatic. Will discharge with PO Fe if hgb < 10  GI:  BID Senna  PPx:  Encouraged ambulation, holding lovenox in the setting of elevated creatinine   Rh: Positive  Rub:  Immune  Feed: Breast feeding  BC: Desires oral contraception  Dispo: Plan for home on POD#2-3.      Chelsie Nielson, MS3  Medical Student     Resident/Fellow Attestation   I, Christopher Mcintosh, was present with the medical student who participated in the service and in the documentation of the note.  I have verified the history and personally performed the physical exam and medical decision making.  I agree with the assessment and plan of care as  documented in the note.  I have reviewed the note and made changes as necessary.    Christopher Mcintosh DO, MA  [facility]- PGY2  7:44 AM October 5, 2023

## 2023-10-05 NOTE — PROGRESS NOTES
IP Diabetes Management  Daily Note         HPI: Lauren is a 33 year old female with a PMHx  at 36w3d with a past medical history of type 1 diabetes (noted to be within good control this pregnancy)  and hypothyroidism who was admitted on 10/2/2023 for IOL- noted concerns for pre-eclampsia.      Lauren is currently postpartum- transferred to [facility] yesterday    Assessment:   1.Type 1 diabetes now in post partum period with good control     Plan:    -Continue insulin pump  Tandem T-slim running in control IQ  Basal rates:  Rate #1: 0.7 units/hr 3339-8923  Rate #2 : 0.75 units/hr 3937-8695  Rate #3: 0.45 units/hr 1361-7269  Rate #4: 0.15 units/hr 2681-3655--- increase to 0.2 units per hour  Rate #5: 0.9 units/hr 9895-8185     Sensitivity or correction factor:50  Carb ratio: decrease to 1:12   -BG monitoring TID ac/ hs//0200/0500   -Please contact endocrinology if BG >240 for 4 hours- patient has type 1 diabetes and would be at risk for life threatening DKA   -hypoglycemia protocol   -carb counting protocol      Discharge Planning:    -Tentative diabetic regimen: Insulin pump   -diabetes education needs: CDE consult in for insulin pump assessment- likely no other needs  -Outpatient follow up: Anali Lehman MD- [facility]   -Test claim: none needed at this time    Plan discussed with patient, bedside RN,   primary team- via this note.      Interval History and Assessment: interval glucose trend reviewed:   Patient BG elevated last evening when transitioning off of IV insulin- BG now in tight control 's- patient appreciates tight control. I slightly increased basal rate- although  since running in control IQ may not see a large difference in this. If afternoon escalation noted she may need an increase to sensitivity factor.     Patient notes she has extra supplies if needed- she is very savvy with her pump.      She denies nausea, vomiting or diarrhea. Lauren notes pain is well  controlled.    Labs:10/2  Bicarb:10  Creatinine: 0.82  eGFR: >90  Anion Gap: 10    Current nutritional intake and type: Orders Placed This Encounter      Advance Diet as Tolerated: Regular Diet Adult      Planned Procedures/surgeries:   Steroid planning: none  D5W-containing solutions/medications: order for d5% but not currently running    PTA Diabetes Regimen:   Tandem Insulin pump- control IQ  Basal Rates and Start/End times:   Rate #1 = 1.25 units/hr from 0000 to 1100   Rate #2 = 0.75 units/hr from 1100 to 1500.   Rate #3 = 0.25 units/hr from 1500 to 1930   Rate #4 = 1.5 units/hr from 1930 to 0000.   Bolus settings:  ISF 25  CHO 1:6.5        Prepregnancy:  Basal rates:  Rate #1: 0.7 units/hr 3535-9103  Rate #2 : 1.35 units/hr 5781-0168  Rate #3: 0.85 units/hr 2711-6992  Rate #4: 0.6 units/hr 8749-3594  Rate #5: 1.4 units/hr 1930-000     ISF 35  CHO 1:10           Diabetes History:   Type of Diabetes: Type 1 Diabetes Mellitus  Lab Results   Component Value Date    A1C 5.1 10/03/2023    A1C 7.6 2019    A1C 7.8 2018    A1C 7.3 07/10/2018    A1C 8.4 2018              Review of Systems:     The Review of Systems is negative other than noted in the Interval History.           Medications:     Current Facility-Administered Medications   Medication    acetaminophen (TYLENOL) tablet 975 mg    [START ON 10/6/2023] bisacodyl (DULCOLAX) suppository 10 mg    calcium gluconate 10 % injection 1 g    carboprost (HEMABATE) injection 250 mcg    dextrose 5% in lactated ringers infusion    glucose gel 15-30 g    Or    dextrose 50 % injection 25-50 mL    Or    glucagon injection 1 mg    enoxaparin ANTICOAGULANT (LOVENOX) injection 40 mg    hydrALAZINE (APRESOLINE) injection 10 mg    hydrocortisone (Perianal) (ANUSOL-HC) 2.5 % cream    ibuprofen (ADVIL/MOTRIN) tablet 800 mg    insulin basal rate from AMBULATORY PUMP    insulin bolus from AMBULATORY PUMP    insulin bolus from AMBULATORY PUMP    insulin bolus  from AMBULATORY PUMP    insulin lispro (HumaLOG) 100 UNIT/ML vial for filling pump reservoir    insulin regular (MYXREDLIN) 1 unit/mL infusion    labetalol (NORMODYNE/TRANDATE) injection 20 mg    labetalol (NORMODYNE/TRANDATE) injection 20-80 mg    lactated ringers infusion    lactated ringers infusion    lanolin cream    levothyroxine (SYNTHROID/LEVOTHROID) tablet 50 mcg    lidocaine (LMX4) cream    lidocaine (LMX4) cream    lidocaine (LMX4) cream    lidocaine 1 % 0.1-1 mL    lidocaine 1 % 0.1-1 mL    lidocaine 1 % 0.1-1 mL    magnesium sulfate infusion    methylergonovine (METHERGINE) injection 200 mcg    metoclopramide (REGLAN) injection 10 mg    Or    metoclopramide (REGLAN) tablet 10 mg    midazolam (VERSED) injection 2 mg    midazolam (VERSED) injection 2 mg    misoprostol (CYTOTEC) tablet 400 mcg    Or    misoprostol (CYTOTEC) tablet 800 mcg    naloxone (NARCAN) injection 0.2 mg    Or    naloxone (NARCAN) injection 0.4 mg    Or    naloxone (NARCAN) injection 0.2 mg    Or    naloxone (NARCAN) injection 0.4 mg    NIFEdipine (PROCARDIA) capsule 10-20 mg    No MMR Needed -  Assessment: Patient does not need MMR vaccine    No Tdap Needed - Assessment: Patient does not need Tdap vaccine    ondansetron (ZOFRAN ODT) ODT tab 4 mg    Or    ondansetron (ZOFRAN) injection 4 mg    oxyCODONE (ROXICODONE) tablet 5 mg    oxytocin (PITOCIN) 30 units in 500 mL 0.9% NaCl infusion    oxytocin (PITOCIN) 30 units in 500 mL 0.9% NaCl infusion    oxytocin (PITOCIN) injection 10 Units    oxytocin (PITOCIN) injection 10 Units    prochlorperazine (COMPAZINE) injection 10 mg    Or    prochlorperazine (COMPAZINE) tablet 10 mg    Or    prochlorperazine (COMPAZINE) suppository 25 mg    senna-docusate (SENOKOT-S/PERICOLACE) 8.6-50 MG per tablet 1 tablet    Or    senna-docusate (SENOKOT-S/PERICOLACE) 8.6-50 MG per tablet 2 tablet    simethicone (MYLICON) chewable tablet 80 mg    sodium chloride (PF) 0.9% PF flush 3 mL    sodium chloride (PF)  "0.9% PF flush 3 mL    sodium chloride (PF) 0.9% PF flush 3 mL    sodium chloride (PF) 0.9% PF flush 3 mL    sodium chloride (PF) 0.9% PF flush 3 mL    sodium chloride (PF) 0.9% PF flush 3 mL    sodium chloride 0.9% (bottle) irrigation    sodium chloride 0.9% infusion    [START ON 10/6/2023] sodium phosphate (FLEET ENEMA) 1 enema    tranexamic acid 1 g in 100 mL NS IV bag (premix)            Physical Exam:    /80 (BP Location: Left arm, Patient Position: Semi-Olson's, Cuff Size: Adult Regular)   Pulse 82   Temp 98.3  F (36.8  C) (Oral)   Resp 16   Ht 1.626 m (5' 4\")   Wt 93 kg (205 lb 1.6 oz)   LMP 01/18/2023   SpO2 99%   Breastfeeding Unknown   BMI 35.21 kg/m    General: pleasant, in no distress. Ambulating in room   HEENT: normocephalic, atraumatic. Oral mucous membranes moist.   Lungs: unlabored respiration, no cough  ABD: rounded  Skin: warm and dry, no obvious lesions- very limited assessment  MSK:  moves all extremities  Mental status:  alert, oriented to self, place, time  Psych:  bright affect, calm and appropriate interaction             Data:     Recent Labs   Lab 10/05/23  1344 10/05/23  0845 10/05/23  0502 10/05/23  0201 10/04/23  2217 10/04/23  2016   * 112* 93 165* 190* 172*     Lab Results   Component Value Date    WBC 14.8 (H) 10/03/2023    WBC 10.1 10/02/2023    WBC 10.8 07/17/2021    HGB 8.9 (L) 10/05/2023    HGB 8.9 (L) 10/04/2023    HGB 11.3 (L) 10/04/2023    HCT 39.3 10/03/2023    HCT 34.6 (L) 10/02/2023    HCT 42.8 07/17/2021    MCV 90 10/03/2023    MCV 86 10/02/2023    MCV 90 07/17/2021     10/04/2023     10/04/2023     10/03/2023     Lab Results   Component Value Date     (L) 10/04/2023     10/02/2023     07/17/2021    POTASSIUM 4.0 10/04/2023    POTASSIUM 4.0 10/02/2023    POTASSIUM 3.5 07/17/2021    CHLORIDE 101 10/04/2023    CHLORIDE 106 10/02/2023    CHLORIDE 102 07/17/2021    CO2 16 (L) 10/04/2023    CO2 21 (L) 10/02/2023    " CO2 26 07/17/2021     (H) 10/05/2023     (H) 10/05/2023    GLC 93 10/05/2023     Lab Results   Component Value Date    BUN 18.9 10/04/2023    BUN 16.3 10/02/2023    BUN 13 07/17/2021     Lab Results   Component Value Date    TSH 5.73 (H) 07/10/2018     Lab Results   Component Value Date    AST 22 10/04/2023    AST 32 10/04/2023    AST 30 10/03/2023    ALT 5 10/04/2023    ALT 9 10/04/2023    ALT 10 10/03/2023    ALKPHOS 173 (H) 10/02/2023    ALKPHOS 65 07/17/2021       I spent a total of 35 minutes face to face or coordinating care of Lauren Goodwin.             Over 50% of my time on the unit was spent counseling the patient and/or coordinating care regarding acute hyperglycemia management.  See note for details.      Contacting the Inpatient Diabetes Team   From 7AM-5PM: page inpatient diabetes provider that is following the patient, or utilize the job code paging system. From 5PM-7AM: page the job code for endocrine fellow on call.     Please use the following job code to reach the Inpatient Diabetes team. Note that you must use an in house phone and that job codes cannot receive text pages.   Dial [phone] (or star-star-star [phone] on the new "[employer] telephones), then [phone] to reach the endocrine-diabetes provider on call.    ANNEL Millard, CNP  Inpatient Diabetes Service   Pager: [phone]

## 2023-10-05 NOTE — PLAN OF CARE
Goal Outcome Evaluation - : VSS - had some elevated pressures earlier in shift but decreased. Pain is minimal at this point but pt encouraged to take pain meds as scheduled. Abdominal binder was given to pt to help with pain especially when ambulating. PP assessment WDL. No signs/ symptoms of pree - reflexes are normal and absent clonuses. Barney removed today- UOP is appropriate. Pt had dinner and tolerated that without nausea and vomiting. The settings on pt's ambulatory pump is programmed to appropriate settings - no need to do anything additional per Endo team. See MAR for insulin rates and coverage information.       Plan of Care Reviewed With: patient    Overall Patient Progress: improvingOverall Patient Progress: improving       Problem: Postpartum ( Delivery)  Goal: Successful Maternal Role Transition  Outcome: Progressing  Goal: Hemostasis  Outcome: Progressing  Goal: Effective Bowel Elimination  Outcome: Progressing  Goal: Fluid and Electrolyte Balance  Outcome: Progressing  Goal: Absence of Infection Signs and Symptoms  Outcome: Progressing  Goal: Anesthesia/Sedation Recovery  Outcome: Progressing  Goal: Optimal Pain Control and Function  Outcome: Progressing  Goal: Nausea and Vomiting Relief  Outcome: Progressing  Goal: Effective Urinary Elimination  Outcome: Progressing  Goal: Effective Oxygenation and Ventilation  Outcome: Progressing  Intervention: Optimize Oxygenation and Ventilation  Recent Flowsheet Documentation  Taken 10/4/2023 2009 by Denisa Rabago, RN  Head of Bed (HOB) Positioning: HOB at 30-45 degrees     Problem: Hypertensive Disorders in Pregnancy  Goal: Maternal-Fetal Stabilization  Outcome: Progressing

## 2023-10-05 NOTE — PROGRESS NOTES
"Post  Anesthesia Follow Up Note    Patient: Lauren Goodwin    Patient location: Postpartum floor.    Chief complaint: Acute postoperative pain management s/p epidural administration     Procedure(s) Performed:  Procedure(s):   section    Anesthesia type: Epidural    Subjective:     Pain Control: Well controlled.    Additional ROS:  She does not complain of pruritis at this time. She denies weakness, denies paresthesia, denies difficulties breathing or voiding, denies nausea or vomiting. She is able to ambulate and    tolerates regular diet.    Objective:    Respiratory Function (RR / SpO2 / Airway Patency): Satisfactory    Cardiac Function (HR / Rhythm / BP): Satisfactory    Strength and sensation lower extremities: Normal    Site of spinal/epidural insertion: No signs of infection or inflammation.     Last Vitals: /84 (BP Location: Left arm, Patient Position: Semi-Olson's, Cuff Size: Adult Regular)   Pulse 82   Temp 36.8  C (98.2  F) (Oral)   Resp 16   Ht 1.626 m (5' 4\")   Wt 93 kg (205 lb 1.6 oz)   LMP 2023   SpO2 99%   Breastfeeding Unknown   BMI 35.21 kg/m  1    Assessment and plan:   Lauren Goodwin is a 33 year old female  POD #1 s/p   with epidural bolused with 10ml 2% lidocaine with 1/200,000 epinephrine and morphine (2000 mcg) and single shot TAP nerve block injections with 20 mL bupivacaine 0.25% and 20mL liposome bupivacaine (Exparel) long-acting 1.3% given for postoperative analgesia.  Pt is ambulating without difficulty, no weakness or paresthesias.  There is no evidence of adverse side effects associated with epidural and nerve block injections.  The patient is receiving adequate incisional pain control at this time.  However, we further anticipate that the patient will require opioid/nonopioid analgesics for visceral and muscle pain that is not controlled with local anesthetic.      Thank you for including us in the care for this " patient.    Aayush Hinton MD   Anesthesia Resident, PGY2

## 2023-10-05 NOTE — PROVIDER NOTIFICATION
10/04/23 2229   Provider Notification   Provider Name/Title G3 Mendez Chadwick   Method of Notification Electronic Page   Request Evaluate-Remote   Notification Reason Lab Results     I tried text/paging endo regarding pt's blood sugar parameters - no answer. Her recent one was 190. Will you let me know what her blood sugar parameters are?    Re-paged G2 Karine, Kendrick the same message.     Endo specialist (Dr. Cotto pager: [phone]) response: her ambulatory pump should already be in the correct settings. Basically, no need to treat anything below 200.

## 2023-10-05 NOTE — PLAN OF CARE
Problem: Postpartum ( Delivery)  Goal: Successful Maternal Role Transition  Outcome: Progressing  Goal: Fluid and Electrolyte Balance  Outcome: Progressing  Goal: Optimal Pain Control and Function  Outcome: Progressing  Goal: Effective Urinary Elimination  Outcome: Progressing     Assessment complete and WDL. VSS. Pt is up ad norman, voiding WDL. Pt's I/Os have been completed per orders. Pt denies headache, vision changes or RUQ pain. Edema still noted in BLE, pt educated to elevate feet when in bed, wear compression socks.     Pt breastfeeding and will start using a breast pump this evening. Pt taking tylenol and ibuprofen for pain. Pt wearing an abdominal binder for support.     Blood sugar checks completed per orders, insulin noted in MAR based on pt's insulin pump needs.     Pt and spouse reminded of paperwork to complete and have started watching videos. Continue POC.

## 2023-10-05 NOTE — LACTATION NOTE
This note was copied from a baby's chart.  Follow Up Consult    Met with Lauren to assist with a feeding and show her how to use the Symphony pump. Lauren shares that she is attempting to breastfeed at least every 3 hours. They started supplementing with formula via paced bottle feeding earlier today due to weight loss.    Xander was showing some cues. I helped Lauren position him in the football position on the left breast. He was able to latch and demonstrated intermittent sucking before falling asleep after about 10 minutes.    Lauren was shown how to set up the pump and use the pump in the Initiate setting. She was encouraged to hand express after pumping.    Parents plan to supplement with Lauren's expressed milk and formula after pumping. They will call bedside RN for support with bottle feeding as needed.     Education:   - Potential feeding challenges of Late  Infants  - Potential benefits of using a nipple shield   - Stages of milk production    - Feeding frequency- encourage at least every 3 hours.   - Benefits of skin to skin  - Breastfeeding positions  - Tips to get and maintain a deep latch  - Nutritive vs.non-nutritive sucking  - Expected  output  - [place] weight loss  - [facility] Breastfeeding/Pumping videos  - Benefits of hand expression of colostrum  - Hand expression and pumping recommendations   - Supplement recommendations   - Satiety cues/ when to increase supplement volume  - [facility] breast pump part/infant feeding supplies cleaning recommendations  - Inpatient breastfeeding support  - Outpatient lactation resources and follow up recommendations    Plan: Plan: Continue breastfeeding every 2-3 hours with RN support as needed with a goal of 8-12 feedings per day. Watch for early feeding cues, but if too sleepy and no cues after 3 hours offer breast and go directly to supplementation if no interest.     Encourage frequent skin to skin. Due to infant prematurity, encourage  hand expression after each feeding until milk is in and hands on pumping at least 6-8 times in 24 hours, if able,  to help bring in full milk supply.  Continue giving supplement as recommended until closer to expected due date, or as indicated with follow up pediatrician visits.      Encouraged follow up with outpatient LC within 1 week of discharge. Unsure of clinic but they have some recommendations and the [email or web address] [place] Lactation Resource Handout.     Matilde Guillory RN, IBCLC   Lactation Consultant  Ascom: [phone]  Office: [phone]

## 2023-10-05 NOTE — PLAN OF CARE
Goal Outcome Evaluation:         Data: Vital signs within normal limits. Magnesium infusing at 2g/hr and pt tolerating. 2+ reflexes with no clonus in BLE. Postpartum checks within normal limits - see flow record. Patient eating and drinking normally but slowly. Barney in place and draining adequate amounts of clear yellow urine. Strict intake and output taken. No apparent signs of infection. Incision covered with dressing and CDI.  Has not ambulated but encouraged. SCDs in place.   Action: Patient medicated during the shift for pain. See MAR. Patient reassessed within 1 hour after each medication and pain was improved - patient stated she was comfortable. Patient education done about breastfeeding, hand expression, diabetes regimen. See flow record.  Response: Positive attachment behaviors observed with infant. Support persons dewey present.   Plan: Anticipate discharge on 10/6-10/7.

## 2023-10-05 NOTE — PROVIDER NOTIFICATION
10/04/23 2238   Provider Notification   Provider Name/Title G2 Karine   Method of Notification Electronic Page   Request Evaluate-Remote   Notification Reason Lab Results     Magnesium labs came back elevated at 8.6.     G3 Chadwick Mendez response: Stop magnesium infusion until further directions. Will also look into parameters for blood sugar.

## 2023-10-05 NOTE — CONSULTS
Social Work Consult    DATA: SW received order for consult for patient for community resources. Patient, Lauren, gave birth to her first baby a boy named Xander.     ASSESSMENT: Lauren and her , Aries, reported having questions regarding insurance. Lauren recently started a new job and has insurance through her employer. Aries is currently unemployed and waiting to hear about a job offer, but has insurance through the state. SW provided parents with the phone number to call in [facility] to see if Xander qualifies to be added to Richi's state plan.     Lauren and Richi were also inquired about other community resources to support as finances are currently limited for them. SW provided a handout of resources available to parents in [facility] including: WIC, MFIP, [facility], Postpartum Mental Health resources, and locations where clothing and diapers can be purchased a discounted rates.     SW placed referral for WIC and provided parents with a Second [employer]ork pack of diapers and wipes. SW also provided family with contact info.     PLAN: No additional SW needs identified. Please re-consult SW if any additional needs or questions arise during hospital admission.    TANIA Judd, LincolnHealthSW  Maternal and Child Health   Office: [phone]  Pager: [phone]  After Hours Pager: [phone]  [email or web address]

## 2023-10-05 NOTE — PROVIDER NOTIFICATION
10/05/23 0300   Provider Notification   Provider Name/Title G2 Karine   Method of Notification Electronic Page   Request Evaluate-Remote   Notification Reason Lab Results     Mag level is 6.5 - trending down. Let me know your thoughts. Thanks!    Provider's response: OK to discontinue mag infusion since it'll come off at 0527 anyway.

## 2023-10-05 NOTE — PROVIDER NOTIFICATION
10/04/23 1910   Provider Notification   Provider Name/Title G2 Karine   Method of Notification Electronic Page   Request Evaluate-Remote   Notification Reason Vital Signs Change     FYI pt bp is 140/78 HR 80. Denies pree symptoms.

## 2023-10-05 NOTE — LACTATION NOTE
This note was copied from a baby's chart.  Consult For: Late  Infant; first time breastfeeding mother     Infant Name: Xander    Infant's Primary Care Clinic: undecided     Delivery Information: LGA, Gestational Age: 36w4d via   delivery on 10/4/2023 5:27 AM     Maternal Health History:    Information for the patient's mother:  Lauren Goodwin [[id number]]     Patient Active Problem List   Diagnosis    Type 1 diabetes mellitus without complication (H)    Pilonidal cyst    Pre-op exam    Pilonidal sinus with abscess    Severe preeclampsia        Maternal Breast Exam/Breastfeeding History:  Lauren noted breast growth and sensitivity in early pregnancy. She denies any history of breast/chest injury or surgery. Her breasts are soft and symmetrical, slightly tubular, with bilateral intact nipples. She has been able to hand express colostrum.     Oral exam of baby:  Not completed during this visit; baby has just finished feeding and is sleeping quietly on mother's chest     Infant information:  Xander has lost 7.3% of his birth weight at 24 hours old, age appropriate output, blood sugar checks WDL     Weight Change Since Birth: 7.3% at 1 day old     Feeding History: Mother reports Xander breast fed well on both sides prior to LC visit, LC reviews feeding LPT infant and need for supplementation due to weight loss. Parents prefer formula for supplementation.   Encouraged continuing to breastfeed on demand, limit BF to 10 minutes per side and offer supplementation per unit order set. Mother should pump 4-5 times per day after breastfeeding and continue to do HE and massage after every feeding.     Education:   - Potential feeding challenges of Late  Infants  - Expected  feeding patterns in the first few days (pg. 38 of Your Guide to To Postpartum and  Care)/ the Second Night  - Stages of milk production  - Benefits of hand expression of colostrum    - Feeding frequency- encourage at least  every 3 hours.   - Expected  output  -  weight loss  - Infant Feeding Log  - [employer] [facility] Breastfeeding/Pumping videos  - Signs breastfeeding is going well (comfortable latch, audible swallows, age appropriate output and weight loss)    - Tips to prevent engorgement  - Signs of engorgement  - Tips to manage engorgement  - Hand expression and pumping recommendations   - Supplement recommendations   - [facility][note 3] breast pump part/infant feeding supplies cleaning recommendations  - Inpatient breastfeeding support  - Outpatient lactation resources and follow up recommendations    Handouts: How to Keep Your Breast Pump Kit Clean, Infant Feeding Log (Week 1, Your Guide to Postpartum & Steger Care Book), and [facility] Lactation Resources    Plan: Continue breastfeeding every 2-3 hours with RN support as needed with a goal of 8-12 feedings per day. Watch for early feeding cues, but if too sleepy and no cues after 3 hours offer breast and go directly to supplementation if no interest.     Encourage frequent skin to skin. Due to infant prematurity, encourage hand expression after each feeding until milk is in and hands on pumping at least 4-5 times in 24 hours to help bring in full milk supply. Supplement per provider recommendations. Follow up with lactation within one week.       Oralia Dotson RN, IBCLC   Lactation Consultant  Ascom: *[phone]  Office: [phone]

## 2023-10-06 VITALS
BODY MASS INDEX: 34.33 KG/M2 | HEIGHT: 64 IN | SYSTOLIC BLOOD PRESSURE: 141 MMHG | TEMPERATURE: 98.7 F | DIASTOLIC BLOOD PRESSURE: 86 MMHG | RESPIRATION RATE: 16 BRPM | WEIGHT: 201.06 LBS | OXYGEN SATURATION: 99 % | HEART RATE: 66 BPM

## 2023-10-06 LAB
GLUCOSE BLDC GLUCOMTR-MCNC: 107 MG/DL (ref 70–99)
GLUCOSE BLDC GLUCOMTR-MCNC: 128 MG/DL (ref 70–99)

## 2023-10-06 PROCEDURE — 250N000013 HC RX MED GY IP 250 OP 250 PS 637

## 2023-10-06 PROCEDURE — 99232 SBSQ HOSP IP/OBS MODERATE 35: CPT | Performed by: PHYSICIAN ASSISTANT

## 2023-10-06 PROCEDURE — 99238 HOSP IP/OBS DSCHRG MGMT 30/<: CPT | Mod: GC | Performed by: OBSTETRICS & GYNECOLOGY

## 2023-10-06 RX ADMIN — ACETAMINOPHEN 975 MG: 325 TABLET, FILM COATED ORAL at 00:55

## 2023-10-06 RX ADMIN — SENNOSIDES AND DOCUSATE SODIUM 2 TABLET: 50; 8.6 TABLET ORAL at 08:07

## 2023-10-06 RX ADMIN — ACETAMINOPHEN 975 MG: 325 TABLET, FILM COATED ORAL at 08:07

## 2023-10-06 RX ADMIN — LEVOTHYROXINE SODIUM 50 MCG: 50 TABLET ORAL at 08:07

## 2023-10-06 RX ADMIN — IBUPROFEN 800 MG: 800 TABLET ORAL at 05:07

## 2023-10-06 ASSESSMENT — ACTIVITIES OF DAILY LIVING (ADL)
ADLS_ACUITY_SCORE: 20

## 2023-10-06 NOTE — LACTATION NOTE
This note was copied from a baby's chart.  Follow Up Consult    Infant Name: Xander    Infant[place] [facility] [place]    Maternal Assessment: Lauren shares that she is feeling ready to transition to home. She shares that the latch is comfortable and has been hand expressing after feedings. She has pumped once so far.      Infant Assessment:  Xander has age appropriate voids but no stools on day 2 of life.    Weight Change Since Birth: -9% at 2 day old      Feeding History:   Xander has been breastfeeding and receiving formula supplementation due to >7% weight loss at 24 hours. Lauren shares that some feedings go better than others but she feels that they are slowly making progress.    Feeding Assessment: No feeding assessment done at this visit.     Education:   - Stages of milk production  - Benefits of hand expression of colostrum  - Benefits of skin to skin  - Nutritive vs.non-nutritive sucking  - Gentle breast compressions as needed to enhance milk transfer  - How to tell when baby is finished  - How to tell if baby is getting enough  - Expected  output  - Tiline weight loss  - Infant Feeding Log  - Signs breastfeeding is going well (comfortable latch, audible swallows, age appropriate output and weight loss)    - Pumping recommendations (based on patient need)  - [facility] breast pump part/infant feeding supplies cleaning recommendations  - Outpatient lactation resources    Handouts: Infant Feeding Log (Week 1, Your Guide to Postpartum & Tiline Care Book) and [facility] Lactation Resources    Plan: Continue breastfeeding every 2-3 hours with RN support as needed with a goal of 8-12 feedings per day. Watch for early feeding cues, but if too sleepy and no cues after 3 hours offer breast and go directly to supplementation if no interest.     Encourage frequent skin to skin. Due to infant prematurity, encourage hand expression after each feeding until milk is in and hands on  pumping at least 6-8 times in 24 hours to help bring in full milk supply. Feed back any expressed milk and supplemental milk. Continue giving expressed milk and supplement until closer to expected due date, or as indicated with follow up lactation visits.      Family encouraged to follow up with outpatient lactation consultant at clinic within a week of discharge for support with LPT infant, help to monitor infant weight and milk transfer, establish supply & eventually wean from pumping and nipple shield if used. Family plans to follow up with [facility] outpatient lactation.         Agnes Mckeon RN, [facility]   Lactation Consultant  Ascom: [phone]  Office: [phone]

## 2023-10-06 NOTE — PLAN OF CARE
Data: Vital signs within normal limits. Postpartum checks within normal limits - see flow record. Patient eating and drinking normally. Patient able to empty bladder independently and is up ambulating. No apparent signs of infection. Incision healing well. Patient performing self cares and is able to care for infant.    Action: Patient medicated during the shift for pain. See MAR. Patient reassessed after each medication and patient stated she was comfortable. See flow record.    Response: Positive attachment behaviors observed with infant.  Richi present.     Plan: Continue to follow care plan.

## 2023-10-06 NOTE — CONSULTS
Diabetes CNS and Diabetes Educator consults received for Lauren Goodwin, age 33, now P1 who initially presented at 36w4d for newly diagnosed pre-eclampsia with severe features based on admission blood pressures. Underwent IOL on admission. Ultimately had C section due to category II FHT remote from delivery in the setting of inadequate contractions despite increasing pitocin and concern for worsening pre-eclampsia.  PMH notable for Tyoe 1 DM managed with an ambulatory insulin infusion pump and hypothyroidism, autoimmune with hx of Hashimoto's thyroiditis..      The Inpatient Diabetes Service/Endocrinology was consulted 10/3/23 for glycemic management recommendations.     Lauren was diagnosed with type 1 DM at age 17.  Received her healthcare for DM from Dr. Anali Lehman MD, [facility].  She has had good control and is very knowledgable regarding her DM and pump therapy.    Ambulatory Insulin Infusion Pump    Pump-Tandem T:Slim x 2 in Control IQ; has used Sleep Mode, on overnight  CGM-Dexcom G 6  Cartridge-holds 200 units  Infusion Set-Auto Soft 90  Insulin-Lispro (Humalog)  Glucagon-Has a prescription filled for glucagon    Basal Rates:  3678-0977-o.7 units/hour  2531-7981-0.75 units/hour  7863-9964-0.45 units/hour  7711-3091-0.2 units/hour  4689-5282-0.9 units/hour    Insulin Sensitivity Factor:  1 unit to lower blood glucose by 50 mg/dL    Insulin:CHO Ratio:  1 unit for every 12 grams of CHO    Target Glucose: 100 mg/dL    Active Insulin/Insulin Duration:  3 hours    Will follow up with her endocrinologist for ongoing diabetes care.    ANNEL Page  Diabetes Clinical Nurse Specialist/[facility]  [phone]

## 2023-10-06 NOTE — DISCHARGE INSTRUCTIONS
IP Diabetes Management Team Discharge Instructions    Glucose Control Regimen:     Tandem Pump    -Continue insulin pump  Tandem T-slim running in control IQ  Basal rates:  Rate #1: 0.7 units/hr 9092-3283  Rate #2 : 0.75 units/hr 3014-4388  Rate #3: 0.45 units/hr 5154-3587  Rate #4: 0.4 units/hr 7752-6777   Rate #5: 0.9 units/hr 8974-7143     Sensitivity or correction factor:50  Carb ratio: Continue 1:12       Blood Glucose Checks: three times daily before meals, and at bedtime.  Use Dexcom and monitor frequently    Reminder that pumping/breast feeding can lower blood glucose.  Patient is well aware of this.  Endocrinology Outpatient follow up:  She will follow up with Dr Haji but just in case she cannot get a hold of her in the next week.  You can  call the [facility] Endocrine clinic at [phone]  if you have non-urgent questions regarding your blood sugars or insulin.   Monday thru Friday 8-4    If you have urgent questions or concerns regarding your blood sugars or insulin, you may contact [phone] (the main [facility] hospital ). Ask to speak with the endocrinologist on call.      Thank you for letting the Diabetes Management Team be involved in your care!    Postop  Birth Instructions    Activity     Do not lift more than 10 pounds for 6 weeks after surgery.  Ask family and friends for help when you need it.  No driving until you have stopped taking your pain medications (usually two weeks after surgery).  No heavy exercise or activity for 6 weeks.  Don't do anything that will put a strain on your surgery site.  Don't strain when using the toilet.  Your care team may prescribe a stool softener if you have problems with your bowel movements.     To care for your incision:     Keep the incision clean and dry.  Do not soak your incision in water. No swimming or hot tubs until it has fully healed. You may soak in the bathtub if the water level is below your  incision.  Do not use peroxide, gel, cream, lotion, or ointment on your incision.  Adjust your clothes to avoid pressure on your surgery site (check the elastic in your underwear for example).     You may see a small amount of clear or pink drainage and this is normal.  Check with your health care provider:     If the drainage increases or has an odor.  If the incision reddens, you have swelling, or develop a rash.  If you have increased pain and the medicine we prescribed doesn't help.  If you have a fever above 100.4 F (38 C) with or without chills when placing thermometer under your tongue.   The area around your incision (surgery wound), will feel numb.  This is normal. The numbness should go away in less than a year.     Keep your hands clean:  Always wash your hands before touching your incision (surgery wound). This helps reduce your risk of infection. If your hands aren't dirty, you may use an alcohol hand-rub to clean your hands. Keep your nails clean and short.    Call your healthcare provider if you have any of these symptoms:     You soak a sanitary pad with blood within 1 hour, or you see blood clots larger than a golf ball.  Bleeding that lasts more than 6 weeks.  Vaginal discharge that smells bad.  Severe pain, cramping or tenderness in your lower belly area.  A need to urinate more frequently (use the toilet more often), more urgently (use the toilet very quickly), or it burns when you urinate.  Nausea and vomiting.  Redness, swelling or pain around a vein in your leg.  Problems breastfeeding or a red or painful area on your breast.  Chest pain and cough or are gasping for air.  Problems with coping with sadness, anxiety or depression. If you have concerns about hurting yourself or the baby, call your provider immediately.    You have questions or concerns after you return home.

## 2023-10-06 NOTE — PLAN OF CARE
Goal Outcome Evaluation:  Pt is stable and discharge to home today with baby. Discharge education and instructions reviewed and verbalized understanding. Encouraged to call clinic for questions after discharge.

## 2023-10-06 NOTE — PROGRESS NOTES
IP Diabetes Management  Daily Note         HPI: Lauren is a 33 year old female with a PMHx  at 36w3d with a past medical history of type 1 diabetes (noted to be within good control this pregnancy)  and hypothyroidism who was admitted on 10/2/2023 for IOL- noted concerns for pre-eclampsia.      Lauren is currently postpartum- transferred to [facility] yesterday    Assessment:   1.Type 1 diabetes now in post partum period with good control   2. Starting to breastfeed/pump, supplement with formula  Plan:    -Continue insulin pump  Tandem T-slim running in control IQ  Basal rates:  Rate #1: 0.7 units/hr 7281-8833  Rate #2 : 0.75 units/hr 9924-2469  Rate #3: 0.45 units/hr 4595-5125  Rate #4: 0.2 units/hr 9345-5159--- increase to 0.4 units per hour  Rate #5: 0.9 units/hr 1701-2629     Sensitivity or correction factor:50  Carb ratio: Continue 1:12   -BG monitoring TID ac/ hs//0200/0500   -Please contact endocrinology if BG >240 for 4 hours- patient has type 1 diabetes and would be at risk for life threatening DKA   -hypoglycemia protocol   -carb counting protocol      Discharge Planning:    -Tentative diabetic regimen: Insulin pump   -diabetes education needs: none  -Outpatient follow up: Anali Lehman MD- [facility].  She [place] [facility] [place] phone numbers in case she cannot get a hold of Dr Mera and is having low BG or BG >250  -Test claim: none needed at this time    Plan discussed with patient in person, bedside RN by phone,   primary team- via this note.      Interval History and Assessment: interval glucose trend reviewed:   - BG remain in tight control 100-146--had 100 after bolus so decreased carb coverage yesterday. patient appreciates tight control. increased basal rate again from 3 to 7:30 pm- she had a 146 but may have been partly post prandial.  She has numbers to call us if BG low with breast feeding pumping.  She has fruit snacks available.   Aware breast feeding pumiping could cause lower BG.  Patient  notes she has extra supplies if needed- she is very savvy with her pump.      She denies nausea, vomiting or diarrhea. B    Labs:10/5  Creatinine: 0.99  eGFR: 77  Hgb=8.9    Current nutritional intake and type: Orders Placed This Encounter      Advance Diet as Tolerated: Regular Diet Adult      Diet      Planned Procedures/surgeries:   Steroid planning: none  D5W-containing solutions/medications: order for d5% but not currently running    PTA Diabetes Regimen:   Tandem Insulin pump- control IQ  Basal Rates and Start/End times:   Rate #1 = 1.25 units/hr from 0000 to 1100   Rate #2 = 0.75 units/hr from 1100 to 1500.   Rate #3 = 0.25 units/hr from 1500 to 1930   Rate #4 = 1.5 units/hr from 1930 to 0000.   Bolus settings:  ISF 25  CHO 1:6.5        Prepregnancy:  Basal rates:  Rate #1: 0.7 units/hr 3522-6051  Rate #2 : 1.35 units/hr 8395-7229  Rate #3: 0.85 units/hr 7811-3301  Rate #4: 0.6 units/hr 6118-0636  Rate #5: 1.4 units/hr 1930-000     ISF 35  CHO 1:10           Diabetes History:   Type of Diabetes: Type 1 Diabetes Mellitus  Lab Results   Component Value Date    A1C 5.1 10/03/2023    A1C 7.6 2019    A1C 7.8 2018    A1C 7.3 07/10/2018    A1C 8.4 2018              Review of Systems:     The Review of Systems is negative other than noted in the Interval History.           Medications:     Current Facility-Administered Medications   Medication    acetaminophen (TYLENOL) tablet 975 mg    bisacodyl (DULCOLAX) suppository 10 mg    calcium gluconate 10 % injection 1 g    carboprost (HEMABATE) injection 250 mcg    dextrose 5% in lactated ringers infusion    glucose gel 15-30 g    Or    dextrose 50 % injection 25-50 mL    Or    glucagon injection 1 mg    enoxaparin ANTICOAGULANT (LOVENOX) injection 40 mg    hydrALAZINE (APRESOLINE) injection 10 mg    hydrocortisone (Perianal) (ANUSOL-HC) 2.5 % cream    ibuprofen (ADVIL/MOTRIN) tablet 800 mg    insulin basal rate from AMBULATORY PUMP    insulin  bolus from AMBULATORY PUMP    insulin bolus from AMBULATORY PUMP    insulin bolus from AMBULATORY PUMP    insulin lispro (HumaLOG) 100 UNIT/ML vial for filling pump reservoir    labetalol (NORMODYNE/TRANDATE) injection 20 mg    labetalol (NORMODYNE/TRANDATE) injection 20-80 mg    lactated ringers infusion    lactated ringers infusion    lanolin cream    levothyroxine (SYNTHROID/LEVOTHROID) tablet 50 mcg    lidocaine (LMX4) cream    lidocaine (LMX4) cream    lidocaine (LMX4) cream    lidocaine 1 % 0.1-1 mL    lidocaine 1 % 0.1-1 mL    lidocaine 1 % 0.1-1 mL    magnesium sulfate infusion    methylergonovine (METHERGINE) injection 200 mcg    metoclopramide (REGLAN) injection 10 mg    Or    metoclopramide (REGLAN) tablet 10 mg    midazolam (VERSED) injection 2 mg    midazolam (VERSED) injection 2 mg    misoprostol (CYTOTEC) tablet 400 mcg    Or    misoprostol (CYTOTEC) tablet 800 mcg    naloxone (NARCAN) injection 0.2 mg    Or    naloxone (NARCAN) injection 0.4 mg    Or    naloxone (NARCAN) injection 0.2 mg    Or    naloxone (NARCAN) injection 0.4 mg    NIFEdipine (PROCARDIA) capsule 10-20 mg    No MMR Needed -  Assessment: Patient does not need MMR vaccine    No Tdap Needed - Assessment: Patient does not need Tdap vaccine    ondansetron (ZOFRAN ODT) ODT tab 4 mg    Or    ondansetron (ZOFRAN) injection 4 mg    oxyCODONE (ROXICODONE) tablet 5 mg    oxytocin (PITOCIN) 30 units in 500 mL 0.9% NaCl infusion    oxytocin (PITOCIN) 30 units in 500 mL 0.9% NaCl infusion    oxytocin (PITOCIN) injection 10 Units    oxytocin (PITOCIN) injection 10 Units    prochlorperazine (COMPAZINE) injection 10 mg    Or    prochlorperazine (COMPAZINE) tablet 10 mg    Or    prochlorperazine (COMPAZINE) suppository 25 mg    senna-docusate (SENOKOT-S/PERICOLACE) 8.6-50 MG per tablet 1 tablet    Or    senna-docusate (SENOKOT-S/PERICOLACE) 8.6-50 MG per tablet 2 tablet    simethicone (MYLICON) chewable tablet 80 mg    sodium chloride (PF) 0.9% PF  "flush 3 mL    sodium chloride (PF) 0.9% PF flush 3 mL    sodium chloride (PF) 0.9% PF flush 3 mL    sodium chloride (PF) 0.9% PF flush 3 mL    sodium chloride (PF) 0.9% PF flush 3 mL    sodium chloride (PF) 0.9% PF flush 3 mL    sodium chloride 0.9% (bottle) irrigation    sodium chloride 0.9% infusion    sodium phosphate (FLEET ENEMA) 1 enema    tranexamic acid 1 g in 100 mL NS IV bag (premix)            Physical Exam:    BP (!) 141/86 (BP Location: Left arm)   Pulse 66   Temp 98.7  F (37.1  C) (Oral)   Resp 16   Ht 1.626 m (5' 4\")   Wt 91.2 kg (201 lb 1 oz)   LMP 01/18/2023   SpO2 99%   Breastfeeding Unknown   BMI 34.51 kg/m    General: pleasant, in no distress. Walking around room  HEENT: normocephalic, atraumatic. Oral mucous membranes moist.   Lungs: unlabored respiration, no cough  ABD: rounded  Skin: warm and dry, no obvious lesions- very limited assessment  MSK:  moves all extremities  Mental status:  alert, oriented to self, place, time  Psych:  bright affect, calm and appropriate interaction             Data:     Recent Labs   Lab 10/06/23  0455 10/05/23  2209 10/05/23  1755 10/05/23  1344 10/05/23  0845 10/05/23  0502   * 119* 146* 100* 112* 93       Lab Results   Component Value Date    WBC 14.8 (H) 10/03/2023    WBC 10.1 10/02/2023    WBC 10.8 07/17/2021    HGB 8.9 (L) 10/05/2023    HGB 8.9 (L) 10/04/2023    HGB 11.3 (L) 10/04/2023    HCT 39.3 10/03/2023    HCT 34.6 (L) 10/02/2023    HCT 42.8 07/17/2021    MCV 90 10/03/2023    MCV 86 10/02/2023    MCV 90 07/17/2021     10/04/2023     10/04/2023     10/03/2023     Lab Results   Component Value Date     (L) 10/04/2023     10/02/2023     07/17/2021    POTASSIUM 4.0 10/04/2023    POTASSIUM 4.0 10/02/2023    POTASSIUM 3.5 07/17/2021    CHLORIDE 101 10/04/2023    CHLORIDE 106 10/02/2023    CHLORIDE 102 07/17/2021    CO2 16 (L) 10/04/2023    CO2 21 (L) 10/02/2023    CO2 26 07/17/2021     (H) " 10/06/2023     (H) 10/05/2023     (H) 10/05/2023     Lab Results   Component Value Date    BUN 18.9 10/04/2023    BUN 16.3 10/02/2023    BUN 13 07/17/2021     Lab Results   Component Value Date    TSH 5.73 (H) 07/10/2018     Lab Results   Component Value Date    AST 22 10/04/2023    AST 32 10/04/2023    AST 30 10/03/2023    ALT 5 10/04/2023    ALT 9 10/04/2023    ALT 10 10/03/2023    ALKPHOS 173 (H) 10/02/2023    ALKPHOS 65 07/17/2021       I spent a total of 35 minutes face to face or coordinating care of Lauren Goodwin.             Over 50% of my time on the unit was spent counseling the patient and/or coordinating care regarding acute hyperglycemia management.  See note for details.      Contacting the Inpatient Diabetes Team   From 7AM-5PM: page inpatient diabetes provider that is following the patient, or utilize the job code paging system. From 5PM-7AM: page the job code for endocrine fellow on call.     Please use the following job code to reach the Inpatient Diabetes team. Note that you must use an in house phone and that job codes cannot receive text pages.   Dial [phone] (or star-star-star [phone] on the new [employer] telephones), then [phone] to reach the endocrine-diabetes provider on call.    Nicolette Bell PA-C  Inpatient Diabetes Service  Pager   [phone]  Date of Service: 10/6/2023

## 2023-10-06 NOTE — PROGRESS NOTES
[facility]   Post-partum Progress Note    Name:  Lauren Goodwin  MRN: [id number]    S:   Patient reports feeling well overall. Pain well controlled. No nausea or vomiting. Voiding spontaneously. Has passed flatus. Lochia similar to menstrual flow. Denies fever/chills, headache, vision changes, chest pain, shortness of breath, RUQ pain,  Breast feeding. Interested in going home today. Has BP cuff at home and feels comfortable checking it. Plans to follow up with primary team at [facility] (she was diverted here).    O:   Patient Vitals for the past 24 hrs:   BP Temp Temp src Pulse Resp Weight   10/06/23 0801 (!) 141/86 98.7  F (37.1  C) Oral 66 -- --   10/06/23 0726 -- -- -- -- -- 91.2 kg (201 lb 1 oz)   10/06/23 0356 136/70 98.4  F (36.9  C) Oral 85 16 --   10/05/23 2341 121/79 98.7  F (37.1  C) Oral 69 16 --   10/05/23 1945 (!) 142/85 97.7  F (36.5  C) Oral 72 16 --   10/05/23 1545 126/78 98.8  F (37.1  C) Oral 79 18 --   10/05/23 1143 126/80 98.3  F (36.8  C) Oral 82 16 --     Gen:  Resting comfortably, NAD  CV:  RRR  Pulm:  Non-labored breathing. No cough or audible wheezing.   Abd:  Soft, appropriately tender to palpation, non-distended.  Fundus below umbilicus, firm, and non-tender.  Inc:  Bandage in place, c/d/i with minimal overlying shadowing, no surrounding erythema or edema    Hgb:   Recent Labs   Lab 10/05/23  0756 10/04/23  2136 10/04/23  0740 10/03/23  2324 10/03/23  1904   HGB 8.9* 8.9* 11.3* 13.0 13.4     A/P:  33 year old  POD#2 s/p PLTCS. Pregnancy notable for preeclampsia with severe features, history of type I diabetes and hypothyroidism. Meeting postop goals. Continue with routine postpartum management.     Preeclampsia with severe features  - Blood pressures have been well controlled, mostly normal with few very mildly elevated BPs. Discussed starting an antihypertensive vs just monitoring. She prefers to monitor and start one if BPs persistently elevated.   - Magnesium  elevated to 8.6 @ 2136 10/4, down to 6.5 on recheck 10/5 @ 0155, will not resume given therapeutic range   - HELLP Labs Cr 0.78 > 0.99 > 1.15 > 1.16 > 1.17 > 0.99, otherwise wnl    Type I Diabetes    - endocrine following  - continue to use home insulin pump per endo recommendations     Hypothyroidism   - Continue PTA levothyroxine     Routine Postpartum  Pain: Well-controlled with scheduled tylenol.  NSAIDs resumed.  Hgb: 13.0 > EBL 1016 > 11.6 > 8.9. Acute blood loss anemia from postpartum hemorrhage. VSS as noted above, asymptomatic. Will discharge with PO Fe.  GI:  BID Senna  PPx:  Encouraged ambulation, holding lovenox in the setting of elevated creatinine   Rh: Positive  Rub:  Immune  Feed: Breast feeding  BC: Desires oral contraception  Dispo: Plan for home today versus tomorrow      Christopher Mcintosh DO, MA  [facility]- PGY2  8:13 AM 2023       Physician Attestation   I personally examined and evaluated this patient.  I discussed the patient with the resident/fellow and care team, and agree with the assessment and plan of care as documented in the note.     Key findings: 33 year old  on POD 2 s/p PLTCS at 36w4d for category 2 tracing remote from delivery in setting of pre-eclampsia with severe features by BP criteria. Patient additionally had type 1 DM. Overall she is doing very well and meeting discharge goals. Discussed few mildly elevated BPs. She prefers to not start an antihypertensive at this time which is reasonable give mostly normotensive and just a few mildly elevated Bps (low 140s/80s). She will continue to check BP at home and update her team if persistently elevated. Pain controlled and not taking oxycodone, prefers no narcotics for discharge. Acute blood loss anemia from surgery and post partum hemorrhage. No s/sx ongoing bleeding, asymptomatic from anemia. Plan discharge to home today. Reviewed discharge instructions including activity restrictions, pelvic rest and follow up plan.  Reviewed signs of excessive bleeding, infection, postpartum pre-eclampsia, mastitis, DVT and postpartum depression - instructed patient to call if concerned about any of these or other concerns. Patient to follow up in 1-2 weeks for BP check, 6 weeks for routine postpartum visit, planning OCP for contraception. All questions answered.      Please see A&P for additional details of medical decision making.    Katelin Marrero MD  Date of Service (when I saw the patient): 10/06/23    .

## 2023-10-06 NOTE — PROVIDER NOTIFICATION
10/05/23 2131   Provider Notification   Provider Name/Title G2 Karine   Method of Notification Electronic Page   Request Evaluate-Remote   Notification Reason Vital Signs Change     FYI - patients BP at 1945 was 142/85.

## 2023-10-07 ENCOUNTER — APPOINTMENT (OUTPATIENT)
Dept: GENERAL RADIOLOGY | Facility: CLINIC | Age: 33
DRG: 776 | End: 2023-10-07
Attending: STUDENT IN AN ORGANIZED HEALTH CARE EDUCATION/TRAINING PROGRAM
Payer: COMMERCIAL

## 2023-10-07 ENCOUNTER — HOSPITAL ENCOUNTER (INPATIENT)
Facility: CLINIC | Age: 33
LOS: 2 days | Discharge: HOME | DRG: 776 | End: 2023-10-09
Attending: STUDENT IN AN ORGANIZED HEALTH CARE EDUCATION/TRAINING PROGRAM | Admitting: OBSTETRICS & GYNECOLOGY
Payer: COMMERCIAL

## 2023-10-07 LAB
ALBUMIN MFR UR ELPH: 14.7 MG/DL
ALBUMIN SERPL BCG-MCNC: 3.2 G/DL (ref 3.5–5.2)
ALP SERPL-CCNC: 198 U/L (ref 35–104)
ALT SERPL W P-5'-P-CCNC: 15 U/L (ref 0–50)
ANION GAP SERPL CALCULATED.3IONS-SCNC: 10 MMOL/L (ref 7–15)
AST SERPL W P-5'-P-CCNC: 41 U/L (ref 0–45)
BASOPHILS # BLD AUTO: 0 10E3/UL (ref 0–0.2)
BASOPHILS # BLD MANUAL: 0.1 10E3/UL (ref 0–0.2)
BASOPHILS NFR BLD AUTO: 0 %
BASOPHILS NFR BLD MANUAL: 1 %
BILIRUB SERPL-MCNC: 0.2 MG/DL
BUN SERPL-MCNC: 9.9 MG/DL (ref 6–20)
CALCIUM SERPL-MCNC: 8.9 MG/DL (ref 8.6–10)
CHLORIDE SERPL-SCNC: 102 MMOL/L (ref 98–107)
CREAT SERPL-MCNC: 0.62 MG/DL (ref 0.51–0.95)
CREAT UR-MCNC: 21.2 MG/DL
DEPRECATED HCO3 PLAS-SCNC: 25 MMOL/L (ref 22–29)
EGFRCR SERPLBLD CKD-EPI 2021: >90 ML/MIN/1.73M2
EOSINOPHIL # BLD AUTO: 0.3 10E3/UL (ref 0–0.7)
EOSINOPHIL # BLD MANUAL: 0.5 10E3/UL (ref 0–0.7)
EOSINOPHIL NFR BLD AUTO: 3 %
EOSINOPHIL NFR BLD MANUAL: 6 %
ERYTHROCYTE [DISTWIDTH] IN BLOOD BY AUTOMATED COUNT: 13.4 % (ref 10–15)
GLUCOSE BLDC GLUCOMTR-MCNC: 112 MG/DL (ref 70–99)
GLUCOSE SERPL-MCNC: 138 MG/DL (ref 70–99)
HCT VFR BLD AUTO: 29.5 % (ref 35–47)
HGB BLD-MCNC: 9.7 G/DL (ref 11.7–15.7)
IMM GRANULOCYTES # BLD: 0.1 10E3/UL
IMM GRANULOCYTES NFR BLD: 2 %
INR PPP: 0.85 (ref 0.85–1.15)
LYMPHOCYTES # BLD AUTO: 1.8 10E3/UL (ref 0.8–5.3)
LYMPHOCYTES # BLD MANUAL: 1.6 10E3/UL (ref 0.8–5.3)
LYMPHOCYTES NFR BLD AUTO: 20 %
LYMPHOCYTES NFR BLD MANUAL: 18 %
MCH RBC QN AUTO: 29.5 PG (ref 26.5–33)
MCHC RBC AUTO-ENTMCNC: 32.9 G/DL (ref 31.5–36.5)
MCV RBC AUTO: 90 FL (ref 78–100)
MONOCYTES # BLD AUTO: 0.4 10E3/UL (ref 0–1.3)
MONOCYTES # BLD MANUAL: 0.4 10E3/UL (ref 0–1.3)
MONOCYTES NFR BLD AUTO: 5 %
MONOCYTES NFR BLD MANUAL: 5 %
NEUTROPHILS # BLD AUTO: 6.2 10E3/UL (ref 1.6–8.3)
NEUTROPHILS # BLD MANUAL: 6.2 10E3/UL (ref 1.6–8.3)
NEUTROPHILS NFR BLD AUTO: 70 %
NEUTROPHILS NFR BLD MANUAL: 70 %
NT-PROBNP SERPL-MCNC: 717 PG/ML (ref 0–450)
PLAT MORPH BLD: NORMAL
PLATELET # BLD AUTO: 277 10E3/UL (ref 150–450)
POTASSIUM SERPL-SCNC: 3.9 MMOL/L (ref 3.4–5.3)
PROT SERPL-MCNC: 6.4 G/DL (ref 6.4–8.3)
PROT/CREAT 24H UR: 0.69 MG/MG CR (ref 0–0.2)
RBC # BLD AUTO: 3.29 10E6/UL (ref 3.8–5.2)
RBC MORPH BLD: NORMAL
SODIUM SERPL-SCNC: 137 MMOL/L (ref 135–145)
TROPONIN T SERPL HS-MCNC: 8 NG/L
WBC # BLD AUTO: 8.8 10E3/UL (ref 4–11)

## 2023-10-07 PROCEDURE — 93010 ELECTROCARDIOGRAM REPORT: CPT | Mod: 59 | Performed by: STUDENT IN AN ORGANIZED HEALTH CARE EDUCATION/TRAINING PROGRAM

## 2023-10-07 PROCEDURE — 80053 COMPREHEN METABOLIC PANEL: CPT | Performed by: EMERGENCY MEDICINE

## 2023-10-07 PROCEDURE — 250N000011 HC RX IP 250 OP 636: Mod: JZ | Performed by: OBSTETRICS & GYNECOLOGY

## 2023-10-07 PROCEDURE — 85025 COMPLETE CBC W/AUTO DIFF WBC: CPT | Performed by: EMERGENCY MEDICINE

## 2023-10-07 PROCEDURE — 99254 IP/OBS CNSLTJ NEW/EST MOD 60: CPT | Mod: 24 | Performed by: OBSTETRICS & GYNECOLOGY

## 2023-10-07 PROCEDURE — 120N000002 HC R&B MED SURG/OB UMMC

## 2023-10-07 PROCEDURE — 99291 CRITICAL CARE FIRST HOUR: CPT | Mod: 25 | Performed by: STUDENT IN AN ORGANIZED HEALTH CARE EDUCATION/TRAINING PROGRAM

## 2023-10-07 PROCEDURE — 85007 BL SMEAR W/DIFF WBC COUNT: CPT | Performed by: EMERGENCY MEDICINE

## 2023-10-07 PROCEDURE — 36415 COLL VENOUS BLD VENIPUNCTURE: CPT | Performed by: EMERGENCY MEDICINE

## 2023-10-07 PROCEDURE — 85610 PROTHROMBIN TIME: CPT | Performed by: STUDENT IN AN ORGANIZED HEALTH CARE EDUCATION/TRAINING PROGRAM

## 2023-10-07 PROCEDURE — 84156 ASSAY OF PROTEIN URINE: CPT | Performed by: STUDENT IN AN ORGANIZED HEALTH CARE EDUCATION/TRAINING PROGRAM

## 2023-10-07 PROCEDURE — 81001 URINALYSIS AUTO W/SCOPE: CPT | Performed by: EMERGENCY MEDICINE

## 2023-10-07 PROCEDURE — 250N000011 HC RX IP 250 OP 636: Mod: JZ

## 2023-10-07 PROCEDURE — 93308 TTE F-UP OR LMTD: CPT | Performed by: STUDENT IN AN ORGANIZED HEALTH CARE EDUCATION/TRAINING PROGRAM

## 2023-10-07 PROCEDURE — 83880 ASSAY OF NATRIURETIC PEPTIDE: CPT | Performed by: EMERGENCY MEDICINE

## 2023-10-07 PROCEDURE — 96374 THER/PROPH/DIAG INJ IV PUSH: CPT | Performed by: STUDENT IN AN ORGANIZED HEALTH CARE EDUCATION/TRAINING PROGRAM

## 2023-10-07 PROCEDURE — 93308 TTE F-UP OR LMTD: CPT | Mod: 26 | Performed by: STUDENT IN AN ORGANIZED HEALTH CARE EDUCATION/TRAINING PROGRAM

## 2023-10-07 PROCEDURE — 250N000011 HC RX IP 250 OP 636: Mod: JZ | Performed by: STUDENT IN AN ORGANIZED HEALTH CARE EDUCATION/TRAINING PROGRAM

## 2023-10-07 PROCEDURE — 71046 X-RAY EXAM CHEST 2 VIEWS: CPT

## 2023-10-07 PROCEDURE — 250N000013 HC RX MED GY IP 250 OP 250 PS 637

## 2023-10-07 PROCEDURE — 84484 ASSAY OF TROPONIN QUANT: CPT | Performed by: EMERGENCY MEDICINE

## 2023-10-07 RX ORDER — OXYTOCIN/0.9 % SODIUM CHLORIDE 30/500 ML
340 PLASTIC BAG, INJECTION (ML) INTRAVENOUS CONTINUOUS PRN
Status: DISCONTINUED | OUTPATIENT
Start: 2023-10-07 | End: 2023-10-09 | Stop reason: HOSPADM

## 2023-10-07 RX ORDER — IBUPROFEN 800 MG/1
800 TABLET, FILM COATED ORAL EVERY 6 HOURS
Status: DISCONTINUED | OUTPATIENT
Start: 2023-10-08 | End: 2023-10-09 | Stop reason: HOSPADM

## 2023-10-07 RX ORDER — NICOTINE POLACRILEX 4 MG
15-30 LOZENGE BUCCAL
Status: DISCONTINUED | OUTPATIENT
Start: 2023-10-07 | End: 2023-10-09 | Stop reason: HOSPADM

## 2023-10-07 RX ORDER — MODIFIED LANOLIN
OINTMENT (GRAM) TOPICAL
Status: DISCONTINUED | OUTPATIENT
Start: 2023-10-07 | End: 2023-10-09 | Stop reason: HOSPADM

## 2023-10-07 RX ORDER — PROCHLORPERAZINE 25 MG
25 SUPPOSITORY, RECTAL RECTAL EVERY 12 HOURS PRN
Status: DISCONTINUED | OUTPATIENT
Start: 2023-10-07 | End: 2023-10-09 | Stop reason: HOSPADM

## 2023-10-07 RX ORDER — ONDANSETRON 2 MG/ML
4 INJECTION INTRAMUSCULAR; INTRAVENOUS EVERY 6 HOURS PRN
Status: DISCONTINUED | OUTPATIENT
Start: 2023-10-07 | End: 2023-10-09 | Stop reason: HOSPADM

## 2023-10-07 RX ORDER — NIFEDIPINE 30 MG/1
30 TABLET, EXTENDED RELEASE ORAL DAILY
Status: DISCONTINUED | OUTPATIENT
Start: 2023-10-07 | End: 2023-10-08

## 2023-10-07 RX ORDER — PROCHLORPERAZINE MALEATE 10 MG
10 TABLET ORAL EVERY 6 HOURS PRN
Status: DISCONTINUED | OUTPATIENT
Start: 2023-10-07 | End: 2023-10-09 | Stop reason: HOSPADM

## 2023-10-07 RX ORDER — NALOXONE HYDROCHLORIDE 0.4 MG/ML
0.2 INJECTION, SOLUTION INTRAMUSCULAR; INTRAVENOUS; SUBCUTANEOUS
Status: DISCONTINUED | OUTPATIENT
Start: 2023-10-07 | End: 2023-10-09 | Stop reason: HOSPADM

## 2023-10-07 RX ORDER — METHYLERGONOVINE MALEATE 0.2 MG/ML
200 INJECTION INTRAVENOUS
Status: DISCONTINUED | OUTPATIENT
Start: 2023-10-07 | End: 2023-10-09 | Stop reason: HOSPADM

## 2023-10-07 RX ORDER — FUROSEMIDE 10 MG/ML
20 INJECTION INTRAMUSCULAR; INTRAVENOUS ONCE
Status: COMPLETED | OUTPATIENT
Start: 2023-10-07 | End: 2023-10-07

## 2023-10-07 RX ORDER — SODIUM CHLORIDE, SODIUM LACTATE, POTASSIUM CHLORIDE, CALCIUM CHLORIDE 600; 310; 30; 20 MG/100ML; MG/100ML; MG/100ML; MG/100ML
INJECTION, SOLUTION INTRAVENOUS
Status: DISCONTINUED
Start: 2023-10-07 | End: 2023-10-07 | Stop reason: HOSPADM

## 2023-10-07 RX ORDER — AMOXICILLIN 250 MG
2 CAPSULE ORAL 2 TIMES DAILY
Status: DISCONTINUED | OUTPATIENT
Start: 2023-10-07 | End: 2023-10-09 | Stop reason: HOSPADM

## 2023-10-07 RX ORDER — LIDOCAINE 40 MG/G
CREAM TOPICAL
Status: DISCONTINUED | OUTPATIENT
Start: 2023-10-07 | End: 2023-10-09 | Stop reason: HOSPADM

## 2023-10-07 RX ORDER — NIFEDIPINE 30 MG/1
30 TABLET, EXTENDED RELEASE ORAL DAILY
Status: DISCONTINUED | OUTPATIENT
Start: 2023-10-08 | End: 2023-10-08

## 2023-10-07 RX ORDER — OXYTOCIN 10 [USP'U]/ML
10 INJECTION, SOLUTION INTRAMUSCULAR; INTRAVENOUS
Status: DISCONTINUED | OUTPATIENT
Start: 2023-10-07 | End: 2023-10-09 | Stop reason: HOSPADM

## 2023-10-07 RX ORDER — SODIUM CHLORIDE, SODIUM LACTATE, POTASSIUM CHLORIDE, CALCIUM CHLORIDE 600; 310; 30; 20 MG/100ML; MG/100ML; MG/100ML; MG/100ML
10-125 INJECTION, SOLUTION INTRAVENOUS CONTINUOUS
Status: DISCONTINUED | OUTPATIENT
Start: 2023-10-07 | End: 2023-10-09 | Stop reason: HOSPADM

## 2023-10-07 RX ORDER — HYDRALAZINE HYDROCHLORIDE 20 MG/ML
10 INJECTION INTRAMUSCULAR; INTRAVENOUS
Status: DISCONTINUED | OUTPATIENT
Start: 2023-10-07 | End: 2023-10-07

## 2023-10-07 RX ORDER — INSULIN LISPRO 100 [IU]/ML
INJECTION, SOLUTION INTRAVENOUS; SUBCUTANEOUS
COMMUNITY

## 2023-10-07 RX ORDER — DEXTROSE, SODIUM CHLORIDE, SODIUM LACTATE, POTASSIUM CHLORIDE, AND CALCIUM CHLORIDE 5; .6; .31; .03; .02 G/100ML; G/100ML; G/100ML; G/100ML; G/100ML
INJECTION, SOLUTION INTRAVENOUS CONTINUOUS
Status: DISCONTINUED | OUTPATIENT
Start: 2023-10-07 | End: 2023-10-09 | Stop reason: HOSPADM

## 2023-10-07 RX ORDER — LEVOTHYROXINE SODIUM 50 UG/1
50 TABLET ORAL DAILY
Status: DISCONTINUED | OUTPATIENT
Start: 2023-10-08 | End: 2023-10-09 | Stop reason: HOSPADM

## 2023-10-07 RX ORDER — OXYCODONE HYDROCHLORIDE 5 MG/1
5 TABLET ORAL EVERY 4 HOURS PRN
Status: DISCONTINUED | OUTPATIENT
Start: 2023-10-07 | End: 2023-10-09 | Stop reason: HOSPADM

## 2023-10-07 RX ORDER — NICOTINE POLACRILEX 4 MG
15-30 LOZENGE BUCCAL
Status: DISCONTINUED | OUTPATIENT
Start: 2023-10-07 | End: 2023-10-07

## 2023-10-07 RX ORDER — CARBOPROST TROMETHAMINE 250 UG/ML
250 INJECTION, SOLUTION INTRAMUSCULAR
Status: DISCONTINUED | OUTPATIENT
Start: 2023-10-07 | End: 2023-10-09 | Stop reason: HOSPADM

## 2023-10-07 RX ORDER — AMOXICILLIN 250 MG
1 CAPSULE ORAL 2 TIMES DAILY
Status: DISCONTINUED | OUTPATIENT
Start: 2023-10-07 | End: 2023-10-09 | Stop reason: HOSPADM

## 2023-10-07 RX ORDER — DEXTROSE MONOHYDRATE 25 G/50ML
25-50 INJECTION, SOLUTION INTRAVENOUS
Status: DISCONTINUED | OUTPATIENT
Start: 2023-10-07 | End: 2023-10-07

## 2023-10-07 RX ORDER — DEXTROSE MONOHYDRATE 50 MG/ML
INJECTION, SOLUTION INTRAVENOUS
Status: COMPLETED
Start: 2023-10-07 | End: 2023-10-08

## 2023-10-07 RX ORDER — SIMETHICONE 80 MG
80 TABLET,CHEWABLE ORAL 4 TIMES DAILY PRN
Status: DISCONTINUED | OUTPATIENT
Start: 2023-10-07 | End: 2023-10-09 | Stop reason: HOSPADM

## 2023-10-07 RX ORDER — BISACODYL 10 MG
10 SUPPOSITORY, RECTAL RECTAL DAILY PRN
Status: DISCONTINUED | OUTPATIENT
Start: 2023-10-09 | End: 2023-10-09 | Stop reason: HOSPADM

## 2023-10-07 RX ORDER — KETOROLAC TROMETHAMINE 30 MG/ML
30 INJECTION, SOLUTION INTRAMUSCULAR; INTRAVENOUS EVERY 6 HOURS
Status: COMPLETED | OUTPATIENT
Start: 2023-10-07 | End: 2023-10-08

## 2023-10-07 RX ORDER — ACETAMINOPHEN 325 MG/1
975 TABLET ORAL EVERY 6 HOURS
Status: DISCONTINUED | OUTPATIENT
Start: 2023-10-07 | End: 2023-10-09 | Stop reason: HOSPADM

## 2023-10-07 RX ORDER — NALOXONE HYDROCHLORIDE 0.4 MG/ML
0.4 INJECTION, SOLUTION INTRAMUSCULAR; INTRAVENOUS; SUBCUTANEOUS
Status: DISCONTINUED | OUTPATIENT
Start: 2023-10-07 | End: 2023-10-09 | Stop reason: HOSPADM

## 2023-10-07 RX ORDER — HYDROCORTISONE 25 MG/G
CREAM TOPICAL 3 TIMES DAILY PRN
Status: DISCONTINUED | OUTPATIENT
Start: 2023-10-07 | End: 2023-10-09 | Stop reason: HOSPADM

## 2023-10-07 RX ORDER — HYDRALAZINE HYDROCHLORIDE 20 MG/ML
10 INJECTION INTRAMUSCULAR; INTRAVENOUS
Status: DISCONTINUED | OUTPATIENT
Start: 2023-10-07 | End: 2023-10-09 | Stop reason: HOSPADM

## 2023-10-07 RX ORDER — TRANEXAMIC ACID 10 MG/ML
1 INJECTION, SOLUTION INTRAVENOUS EVERY 30 MIN PRN
Status: DISCONTINUED | OUTPATIENT
Start: 2023-10-07 | End: 2023-10-09 | Stop reason: HOSPADM

## 2023-10-07 RX ORDER — MISOPROSTOL 200 UG/1
800 TABLET ORAL
Status: DISCONTINUED | OUTPATIENT
Start: 2023-10-07 | End: 2023-10-09 | Stop reason: HOSPADM

## 2023-10-07 RX ORDER — MAGNESIUM SULFATE IN WATER 40 MG/ML
2 INJECTION, SOLUTION INTRAVENOUS CONTINUOUS
Status: DISCONTINUED | OUTPATIENT
Start: 2023-10-07 | End: 2023-10-07

## 2023-10-07 RX ORDER — METOCLOPRAMIDE 10 MG/1
10 TABLET ORAL EVERY 6 HOURS PRN
Status: DISCONTINUED | OUTPATIENT
Start: 2023-10-07 | End: 2023-10-09 | Stop reason: HOSPADM

## 2023-10-07 RX ORDER — CALCIUM GLUCONATE 94 MG/ML
1 INJECTION, SOLUTION INTRAVENOUS
Status: DISCONTINUED | OUTPATIENT
Start: 2023-10-07 | End: 2023-10-07

## 2023-10-07 RX ORDER — MISOPROSTOL 200 UG/1
400 TABLET ORAL
Status: DISCONTINUED | OUTPATIENT
Start: 2023-10-07 | End: 2023-10-09 | Stop reason: HOSPADM

## 2023-10-07 RX ORDER — MAGNESIUM SULFATE HEPTAHYDRATE 40 MG/ML
4 INJECTION, SOLUTION INTRAVENOUS ONCE
Status: DISCONTINUED | OUTPATIENT
Start: 2023-10-07 | End: 2023-10-07

## 2023-10-07 RX ORDER — LABETALOL HYDROCHLORIDE 5 MG/ML
20-80 INJECTION, SOLUTION INTRAVENOUS EVERY 10 MIN PRN
Status: DISCONTINUED | OUTPATIENT
Start: 2023-10-07 | End: 2023-10-07

## 2023-10-07 RX ORDER — DEXTROSE MONOHYDRATE 25 G/50ML
25-50 INJECTION, SOLUTION INTRAVENOUS
Status: DISCONTINUED | OUTPATIENT
Start: 2023-10-07 | End: 2023-10-09 | Stop reason: HOSPADM

## 2023-10-07 RX ORDER — SODIUM CHLORIDE, SODIUM LACTATE, POTASSIUM CHLORIDE, CALCIUM CHLORIDE 600; 310; 30; 20 MG/100ML; MG/100ML; MG/100ML; MG/100ML
INJECTION, SOLUTION INTRAVENOUS CONTINUOUS
Status: DISCONTINUED | OUTPATIENT
Start: 2023-10-07 | End: 2023-10-07

## 2023-10-07 RX ORDER — ONDANSETRON 4 MG/1
4 TABLET, ORALLY DISINTEGRATING ORAL EVERY 6 HOURS PRN
Status: DISCONTINUED | OUTPATIENT
Start: 2023-10-07 | End: 2023-10-09 | Stop reason: HOSPADM

## 2023-10-07 RX ORDER — LABETALOL HYDROCHLORIDE 5 MG/ML
20-80 INJECTION, SOLUTION INTRAVENOUS EVERY 10 MIN PRN
Status: DISCONTINUED | OUTPATIENT
Start: 2023-10-07 | End: 2023-10-09 | Stop reason: HOSPADM

## 2023-10-07 RX ORDER — METOCLOPRAMIDE HYDROCHLORIDE 5 MG/ML
10 INJECTION INTRAMUSCULAR; INTRAVENOUS EVERY 6 HOURS PRN
Status: DISCONTINUED | OUTPATIENT
Start: 2023-10-07 | End: 2023-10-09 | Stop reason: HOSPADM

## 2023-10-07 RX ADMIN — FUROSEMIDE 20 MG: 10 INJECTION, SOLUTION INTRAMUSCULAR; INTRAVENOUS at 23:25

## 2023-10-07 RX ADMIN — ACETAMINOPHEN 975 MG: 325 TABLET, FILM COATED ORAL at 21:59

## 2023-10-07 RX ADMIN — SODIUM CHLORIDE, SODIUM LACTATE, POTASSIUM CHLORIDE, CALCIUM CHLORIDE AND DEXTROSE MONOHYDRATE: 5; 600; 310; 30; 20 INJECTION, SOLUTION INTRAVENOUS at 23:26

## 2023-10-07 RX ADMIN — NIFEDIPINE 30 MG: 30 TABLET, FILM COATED, EXTENDED RELEASE ORAL at 20:06

## 2023-10-07 RX ADMIN — KETOROLAC TROMETHAMINE 30 MG: 30 INJECTION, SOLUTION INTRAMUSCULAR; INTRAVENOUS at 23:25

## 2023-10-07 RX ADMIN — LABETALOL HYDROCHLORIDE 20 MG: 5 INJECTION, SOLUTION INTRAVENOUS at 15:52

## 2023-10-07 ASSESSMENT — ACTIVITIES OF DAILY LIVING (ADL)
ADLS_ACUITY_SCORE: 35

## 2023-10-07 NOTE — PHARMACY-ADMISSION MEDICATION HISTORY
Admission Medication History    Admission medication history is complete. The information provided in this note is only as accurate as the sources available at the time of the update.    Information Source(s):   Patient  Dispense Report    Pertinent Information:   Patient-reported medications are consistent with dispense history.    Changes made to PTA medication list:  Added: None  Deleted: None  Changed: None    Allergies reviewed with patient and updates made in EHR: yes    Medication History Completed By: Katelin Adrian 10/7/2023 7:00 PM    PTA Med List   Medication Sig Last Dose    acetaminophen (TYLENOL) 325 MG tablet Take 2 tablets (650 mg) by mouth every 6 hours as needed for mild pain Start after Delivery. 10/7/2023 at 1400    ferrous sulfate (FE TABS) 325 (65 Fe) MG EC tablet Take 1 tablet (325 mg) by mouth every other day 10/7/2023 at 1100    ibuprofen (ADVIL/MOTRIN) 600 MG tablet Take 1 tablet (600 mg) by mouth every 6 hours as needed for moderate pain Start after delivery 10/7/2023 at 1000    insulin lispro (HUMALOG VIAL) 100 UNIT/ML vial Use up to 50 units daily in insulin pump 10/7/2023    levothyroxine (SYNTHROID/LEVOTHROID) 50 MCG tablet Take 50 mcg by mouth daily 10/7/2023 at AM    senna-docusate (SENOKOT-S/PERICOLACE) 8.6-50 MG tablet Take 1 tablet by mouth daily Start after delivery. 10/7/2023 at 1100

## 2023-10-07 NOTE — ED PROVIDER NOTES
ED Provider Note  [facility]      History     Chief Complaint   Patient presents with    Hypertension     Pt had a baby Wednesday and has preclampsia     HPI  Lauren Goodwin is a 33 year old female who has a past medical history significant for preeclampsia.  She was delivered via  section 3 days ago.  Presents to the emergency department today with some shortness of breath nausea and elevated blood pressures.  No headaches.  No seizures.  No other neurologic symptoms.  No chest pain.  No abdominal pain.  Discharge from this facility yesterday.    Past Medical History  No past medical history on file.  Past Surgical History:   Procedure Laterality Date     SECTION N/A 10/4/2023    Procedure:  section;  Surgeon: Lula Mendez MD;  Location: [facility] L[facility]    MOUTH SURGERY      NO PAST SURGERIES      PILONIDAL CYST / SINUS EXCISION N/A 2018    Procedure: PILONIDAL TRACTOTECTOMY;  Surgeon: Adriel Banuelos MD;  Location: [facility];  Service: General    WISDOM TOOTH EXTRACTION       acetaminophen (TYLENOL) 325 MG tablet  blood glucose test (CONTOUR NEXT TEST STRIPS) strips  ferrous sulfate (FE TABS) 325 (65 Fe) MG EC tablet  ibuprofen (ADVIL/MOTRIN) 600 MG tablet  insulin lispro (ADMELOG SOLOSTAR U-100 INSULIN) 100 unit/mL pen  levothyroxine (SYNTHROID/LEVOTHROID) 50 MCG tablet  norethindrone-ethinyl estradiol (MICROGESTIN ) 1-20 mg-mcg per tablet  NOVOLOG U-100 INSULIN ASPART 100 unit/mL injection  Prenatal Vit-Fe Fumarate-FA (PRENATAL MULTIVITAMIN  PLUS IRON) 27-1 MG TABS  senna-docusate (SENOKOT-S/PERICOLACE) 8.6-50 MG tablet      Allergies   Allergen Reactions    Sulfamethoxazole-Trimethoprim Rash     Family History  Family History   Problem Relation Age of Onset    Thyroid Disease Mother     Crohn's Disease Sister     No Known Problems Brother     Cerebrovascular Disease Maternal Grandfather     No Known Problems Father     No Known Problems  "Sister      Social History   Social History     Tobacco Use    Smoking status: Never    Smokeless tobacco: Never   Substance Use Topics    Alcohol use: Yes     Comment: Alcoholic Drinks/day: occasionally    Drug use: No         A medically appropriate review of systems was performed with pertinent positives and negatives noted in the HPI, and all other systems negative.    Physical Exam   BP: (!) 178/87  Pulse: 71  Temp: 98.8  F (37.1  C)  Resp: 16  Height: 162.6 cm (5' 4\")  Weight: 89.1 kg (196 lb 6.4 oz)  SpO2: 98 %  Physical Exam  Vital Signs Reviewed  Gen: Well nourished, well developed, resting comfortably, no acute distress  HEENT: NC/AT, PERRL, EOMI, MMM  Neck: Supple, FROM  CV: Regular Rate, no murmur/rub/gallop  Lungs/Chest: Normal Effort, CTAB  Abd: Non-distended, non-tender  MSK/Back: FROM, no visible deformity  Neuro: A&Ox3, GCS 15, CN II-XII unremarkable, grossly nonfocal  Skin: Warm, Dry, Intact, no visible lesions    ED Course, Procedures, & Data   Patient seen and evaluated at the bedside  ED Course as of 10/07/23 1821   Sat Oct 07, 2023   1517 Page placed to gyn   1529 Discussed with Gyn residents and Dr. Gardner (staff). ED PreE orderset initiated.   1601 Seen by gyn service. Will admit for post-partum PreE.   Bedside ultrasound with preserved ejection fraction, small pericardial effusion without tamponade  Labs reassuring, mild BNP elevation.  Chest x-ray pending.  Patient admitted to the gynecology service for treatment of postpartum preeclampsia.    Procedures  Results for orders placed during the hospital encounter of 10/07/23    POC US ECHO LIMITED    Impression  Limited Bedside Cardiac Ultrasound, performed and interpreted by me.  Indication: Shortness of Breath.  Parasternal long axis, parasternal short axis, apical 4 chamber, and subcostal views were acquired.  Image quality was satisfactory.    Findings:  Abnormal -grossly preserved left ventricular ejection fraction.  No visualized RV " dilation.  Small pericardial effusion, no visualized tamponade physiology.    IMPRESSION: Abnormal limited cardiac ultrasound showing grossly preserved left ventricular ejection fraction.  No visualized RV dilation or strain..  Small pericardial effusion without tamponade.            EKG Interpretation:      Interpreted by Sanford Noe MD  Time reviewed: 1735  Symptoms at time of EKG: Dyspnea   Rhythm: normal sinus   Rate: Normal  Axis: Normal  Ectopy: none  Conduction: normal  ST Segments/ T Waves: No acute ischemic changes  Q Waves: none  Comparison to prior: No old EKG available    Clinical Impression: NSR, no RENETTA          Results for orders placed or performed during the hospital encounter of 10/07/23   Chest XR,  PA & LAT     Status: None    Narrative    EXAM: XR CHEST 2 VIEWS  LOCATION: [facility]  DATE: 10/7/2023    INDICATION: Dyspnea  COMPARISON: None.      Impression    IMPRESSION: Negative chest.   POC US ECHO LIMITED     Status: None    Impression    Limited Bedside Cardiac Ultrasound, performed and interpreted by me.   Indication: Shortness of Breath.  Parasternal long axis, parasternal short axis, apical 4 chamber, and subcostal views were acquired.   Image quality was satisfactory.    Findings:    Abnormal -grossly preserved left ventricular ejection fraction.  No visualized RV dilation.  Small pericardial effusion, no visualized tamponade physiology.    IMPRESSION: Abnormal limited cardiac ultrasound showing grossly preserved left ventricular ejection fraction.  No visualized RV dilation or strain..  Small pericardial effusion without tamponade.       Comprehensive metabolic panel     Status: Abnormal   Result Value Ref Range    Sodium 137 135 - 145 mmol/L    Potassium 3.9 3.4 - 5.3 mmol/L    Carbon Dioxide (CO2) 25 22 - 29 mmol/L    Anion Gap 10 7 - 15 mmol/L    Urea Nitrogen 9.9 6.0 - 20.0 mg/dL    Creatinine 0.62 0.51 - 0.95 mg/dL    GFR Estimate  >90 >60 mL/min/1.73m2    Calcium 8.9 8.6 - 10.0 mg/dL    Chloride 102 98 - 107 mmol/L    Glucose 138 (H) 70 - 99 mg/dL    Alkaline Phosphatase 198 (H) 35 - 104 U/L    AST 41 0 - 45 U/L    ALT 15 0 - 50 U/L    Protein Total 6.4 6.4 - 8.3 g/dL    Albumin 3.2 (L) 3.5 - 5.2 g/dL    Bilirubin Total 0.2 <=1.2 mg/dL   Nt probnp inpatient (BNP)     Status: Abnormal   Result Value Ref Range    N terminal Pro BNP Inpatient 717 (H) 0 - 450 pg/mL   Troponin T, High Sensitivity     Status: Normal   Result Value Ref Range    Troponin T, High Sensitivity 8 <=14 ng/L   INR     Status: Normal   Result Value Ref Range    INR 0.85 0.85 - 1.15   CBC with platelets and differential     Status: Abnormal   Result Value Ref Range    WBC Count 8.8 4.0 - 11.0 10e3/uL    RBC Count 3.29 (L) 3.80 - 5.20 10e6/uL    Hemoglobin 9.7 (L) 11.7 - 15.7 g/dL    Hematocrit 29.5 (L) 35.0 - 47.0 %    MCV 90 78 - 100 fL    MCH 29.5 26.5 - 33.0 pg    MCHC 32.9 31.5 - 36.5 g/dL    RDW 13.4 10.0 - 15.0 %    Platelet Count 277 150 - 450 10e3/uL    % Neutrophils 70 %    % Lymphocytes 20 %    % Monocytes 5 %    % Eosinophils 3 %    % Basophils 0 %    % Immature Granulocytes 2 %    Absolute Neutrophils 6.2 1.6 - 8.3 10e3/uL    Absolute Lymphocytes 1.8 0.8 - 5.3 10e3/uL    Absolute Monocytes 0.4 0.0 - 1.3 10e3/uL    Absolute Eosinophils 0.3 0.0 - 0.7 10e3/uL    Absolute Basophils 0.0 0.0 - 0.2 10e3/uL    Absolute Immature Granulocytes 0.1 <=0.4 10e3/uL   Manual Differential     Status: None   Result Value Ref Range    % Neutrophils 70 %    % Lymphocytes 18 %    % Monocytes 5 %    % Eosinophils 6 %    % Basophils 1 %    Absolute Neutrophils 6.2 1.6 - 8.3 10e3/uL    Absolute Lymphocytes 1.6 0.8 - 5.3 10e3/uL    Absolute Monocytes 0.4 0.0 - 1.3 10e3/uL    Absolute Eosinophils 0.5 0.0 - 0.7 10e3/uL    Absolute Basophils 0.1 0.0 - 0.2 10e3/uL    RBC Morphology Confirmed RBC Indices     Platelet Assessment  Automated Count Confirmed. Platelet morphology is normal.      Automated Count Confirmed. Platelet morphology is normal.   CBC with platelets differential     Status: Abnormal    Narrative    The following orders were created for panel order CBC with platelets differential.  Procedure                               Abnormality         Status                     ---------                               -----------         ------                     CBC with platelets and d...[[id number]]  Abnormal            Final result               Manual Differential[[id number]]                              Final result                 Please view results for these tests on the individual orders.     Medications   labetalol (NORMODYNE/TRANDATE) injection 20-80 mg (20 mg Intravenous $Given 10/7/23 1552)   hydrALAZINE (APRESOLINE) injection 10 mg (has no administration in time range)   lactated ringers infusion (has no administration in time range)   calcium gluconate 10 % injection 1 g (has no administration in time range)   magnesium sulfate 4 g in 100 mL sterile water intermittent infusion (0 g Intravenous Hold 10/7/23 1647)     Followed by   magnesium sulfate infusion (has no administration in time range)     Labs Ordered and Resulted from Time of ED Arrival to Time of ED Departure   COMPREHENSIVE METABOLIC PANEL - Abnormal       Result Value    Sodium 137      Potassium 3.9      Carbon Dioxide (CO2) 25      Anion Gap 10      Urea Nitrogen 9.9      Creatinine 0.62      GFR Estimate >90      Calcium 8.9      Chloride 102      Glucose 138 (*)     Alkaline Phosphatase 198 (*)     AST 41      ALT 15      Protein Total 6.4      Albumin 3.2 (*)     Bilirubin Total 0.2     NT PROBNP INPATIENT - Abnormal    N terminal Pro BNP Inpatient 717 (*)    CBC WITH PLATELETS AND DIFFERENTIAL - Abnormal    WBC Count 8.8      RBC Count 3.29 (*)     Hemoglobin 9.7 (*)     Hematocrit 29.5 (*)     MCV 90      MCH 29.5      MCHC 32.9      RDW 13.4      Platelet Count 277      % Neutrophils 70      % Lymphocytes 20       % Monocytes 5      % Eosinophils 3      % Basophils 0      % Immature Granulocytes 2      Absolute Neutrophils 6.2      Absolute Lymphocytes 1.8      Absolute Monocytes 0.4      Absolute Eosinophils 0.3      Absolute Basophils 0.0      Absolute Immature Granulocytes 0.1     TROPONIN T, HIGH SENSITIVITY - Normal    Troponin T, High Sensitivity 8     INR - Normal    INR 0.85     DIFFERENTIAL    % Neutrophils 70      % Lymphocytes 18      % Monocytes 5      % Eosinophils 6      % Basophils 1      Absolute Neutrophils 6.2      Absolute Lymphocytes 1.6      Absolute Monocytes 0.4      Absolute Eosinophils 0.5      Absolute Basophils 0.1      RBC Morphology Confirmed RBC Indices      Platelet Assessment        Value: Automated Count Confirmed. Platelet morphology is normal.   ROUTINE UA WITH MICROSCOPIC REFLEX TO CULTURE   PROTEIN RANDOM URINE     Chest XR,  PA & LAT   Final Result   IMPRESSION: Negative chest.      POC US ECHO LIMITED   Final Result   Limited Bedside Cardiac Ultrasound, performed and interpreted by me.    Indication: Shortness of Breath.   Parasternal long axis, parasternal short axis, apical 4 chamber, and subcostal views were acquired.    Image quality was satisfactory.      Findings:     Abnormal -grossly preserved left ventricular ejection fraction.  No visualized RV dilation.  Small pericardial effusion, no visualized tamponade physiology.      IMPRESSION: Abnormal limited cardiac ultrasound showing grossly preserved left ventricular ejection fraction.  No visualized RV dilation or strain..  Small pericardial effusion without tamponade.                   Critical care was performed.   Critical Care Addendum  My initial assessment, based on my review of prehospital provider report, review of nursing observations, review of vital signs, and focused history, established a high suspicion that Lauren Goodwin has  postpartum preeclampsia , which requires immediate intervention, and therefore she is  critically ill.     After the initial assessment, the care team initiated multiple lab tests, initiated IV fluid administration, initiated medication therapy with IV labetalol, magnesium, and consulted with OB/Gyn  to provide stabilization care. Due to the critical nature of this patient, I reassessed vital signs multiple times prior to her disposition.     Time also spent performing documentation, reviewing test results, discussion with consultants, and coordination of care.     Critical care time (excluding teaching time and procedures): 30 minutes.     Assessment & Plan    Lauren Goodwin is a 33 year old female who has a past medical history significant for preeclampsia.  She was delivered via  section 3 days ago.  Presents to the emergency department today with some shortness of breath nausea and elevated blood pressures.  Patient had 2 blood pressure readings with systolics in the 170s.  Bernien was consulted.  Preeclampsia order set initiated.  Received IV labetalol with improvement in her blood pressures.  Magnesium was ordered as part of this as well.  Prior to being administered gynecology team notified RNs they wish to hold it due to the amount of magnesium she had received during her prior hospitalization.  Bedside ultrasound showed good ejection fraction, small pericardial effusion with no tamponade.  X-ray is clear.  Labs reassuring.  Slightly elevated BNP of unclear significance but does not appear to have acute heart failure at this time.    Patient was accepted for admission to the OB/GYN team.  Remained in the ED after my shift pending bed availability upstairs.      New Prescriptions    No medications on file       Final diagnoses:   Pre-eclampsia in postpartum period       Sanford Noe Jr., MD   [facility] EMERGENCY DEPARTMENT  10/7/2023     Sanford Noe MD  10/07/23 1828

## 2023-10-07 NOTE — ED TRIAGE NOTES
Triage Assessment       Row Name 10/07/23 1504       Triage Assessment (Adult)    Airway WDL WDL       Respiratory WDL    Respiratory WDL X  feels SOB       Skin Circulation/Temperature WDL    Skin Circulation/Temperature WDL WDL       Cardiac WDL    Cardiac WDL WDL       Peripheral/Neurovascular WDL    Peripheral Neurovascular WDL WDL       Cognitive/Neuro/Behavioral WDL    Cognitive/Neuro/Behavioral WDL WDL

## 2023-10-07 NOTE — H&P
[facility]  Obstetrics History and Physical    Lauren Goodwin MRN# [id number]   Age: 33 year old YOB: 1990     Date of Admission: 10/7/2023    Consulting provider:  Dr. Ochoa - Emergency Department    HPI: Lauren Goodwin is a 33 year old  who is POD#3 s/p PLTCS for cat II FHT, RFD. Postpartum course was notable for pre-eclampsia with severe features  met by blood pressure criteria. She received a 24 hour course of magnesium for seizure prophylaxis. By day of discharge patients blood pressures were largely normotensive. Patient was offered initiation of long acting blood pressure medication, but ultimately elected to continue monitoring blood pressures as an outpatient. She was discharged home yesterday.     Patient states following discharge home she  took her blood pressure earlier today and found her  blood pressures to be in the 160s-170s/90s-100s. She called the nursing triage line and was ultimately advised to present to the ED for further evaluation. Patient states she has been largely feeling well since discharge home. Pain has been well controlled with tylenol and ibuprofen. She does complain of feeling intermittent SOB. Denies chest pain, headache, blurry or double vision, change in LE edema, fever, chills, vomiting, bowel or bladder complaints. No other complaints at this time    ROS: 10 point review of systems is reviwed and otherwise negative except for as noted in the HPI.     PMH:   - Pre-eclampsia with severe features  - Type I Diabetes  - Hasimoto thyroiditis    PSHx:   Past Surgical History:   Procedure Laterality Date     SECTION N/A 10/4/2023    Procedure:  section;  Surgeon: Lula Mendez MD;  Location:  L+D    MOUTH SURGERY      NO PAST SURGERIES      PILONIDAL CYST / SINUS EXCISION N/A 2018    Procedure: PILONIDAL TRACTOTECTOMY;  Surgeon: Adriel Banuelos MD;  Location: [facility]  OR;  Service: General    WISDOM TOOTH EXTRACTION         Medications:   Current Outpatient Medications   Medication    acetaminophen (TYLENOL) 325 MG tablet    blood glucose test (CONTOUR NEXT TEST STRIPS) strips    ferrous sulfate (FE TABS) 325 (65 Fe) MG EC tablet    ibuprofen (ADVIL/MOTRIN) 600 MG tablet    insulin lispro (ADMELOG SOLOSTAR U-100 INSULIN) 100 unit/mL pen    levothyroxine (SYNTHROID/LEVOTHROID) 50 MCG tablet    norethindrone-ethinyl estradiol (MICROGESTIN 1/20) 1-20 mg-mcg per tablet    NOVOLOG U-100 INSULIN ASPART 100 unit/mL injection    Prenatal Vit-Fe Fumarate-FA (PRENATAL MULTIVITAMIN  PLUS IRON) 27-1 MG TABS    senna-docusate (SENOKOT-S/PERICOLACE) 8.6-50 MG tablet     Allergies:    Allergies   Allergen Reactions    Sulfamethoxazole-Trimethoprim Rash       Social History:   Social History     Socioeconomic History    Marital status:      Spouse name: Not on file    Number of children: Not on file    Years of education: Not on file    Highest education level: Not on file   Occupational History    Not on file   Tobacco Use    Smoking status: Never    Smokeless tobacco: Never   Substance and Sexual Activity    Alcohol use: Yes     Comment: Alcoholic Drinks/day: occasionally    Drug use: No    Sexual activity: Not on file   Other Topics Concern    Not on file   Social History Narrative    Not on file     Social Determinants of Health     Financial Resource Strain: Not on file   Food Insecurity: Not on file   Transportation Needs: Not on file   Physical Activity: Not on file   Stress: Not on file   Social Connections: Not on file   Interpersonal Safety: Not on file   Housing Stability: Not on file     Social History     Socioeconomic History    Marital status:    Tobacco Use    Smoking status: Never    Smokeless tobacco: Never   Substance and Sexual Activity    Alcohol use: Yes     Comment: Alcoholic Drinks/day: occasionally    Drug use: No       Physical Exam:   Patient Vitals for  "the past 24 hrs:   BP Temp Temp src Pulse Resp SpO2 Height Weight   10/07/23 2159 (!) 150/84 98.3  F (36.8  C) Oral 68 16 -- -- 88 kg (194 lb 0.1 oz)   10/07/23 2058 (!) 155/86 -- -- 66 -- -- -- --   10/07/23 2022 (!) 146/85 -- -- 67 -- -- -- --   10/07/23 2007 (!) 157/85 99.1  F (37.3  C) Oral 68 18 -- -- --   10/07/23 1910 (!) 151/106 -- -- 74 -- -- -- --   10/07/23 1900 (!) 150/83 -- -- 69 -- -- -- --   10/07/23 1850 (!) 153/84 -- -- 66 -- -- -- --   10/07/23 1845 (!) 158/87 -- -- -- -- -- -- --   10/07/23 1840 (!) 158/87 -- -- 66 -- -- -- --   10/07/23 1830 (!) 147/87 -- -- 65 -- -- -- --   10/07/23 1820 (!) 155/88 -- -- 67 -- -- -- --   10/07/23 1810 (!) 149/88 -- -- 69 -- -- -- --   10/07/23 1721 -- -- -- 68 17 98 % -- --   10/07/23 1720 (!) 159/88 -- -- -- -- -- -- --   10/07/23 1710 (!) 153/88 -- -- 69 11 97 % -- --   10/07/23 1640 (!) 149/83 -- -- 63 19 96 % -- --   10/07/23 1620 (!) 159/90 -- -- 67 12 98 % -- --   10/07/23 1616 (!) 159/90 -- -- 69 18 97 % -- --   10/07/23 1606 (!) 161/85 -- -- 69 10 95 % -- --   10/07/23 1601 (!) 154/86 -- -- 62 16 98 % -- --   10/07/23 1600 (!) 154/86 -- -- 70 12 99 % -- --   10/07/23 1545 (!) 157/98 -- -- 64 10 99 % -- --   10/07/23 1531 (!) 162/92 -- -- 60 18 99 % -- --   10/07/23 1530 (!) 164/92 -- -- 70 12 99 % -- --   10/07/23 1509 -- -- Oral 71 16 98 % -- --   10/07/23 1508 (!) 178/91 -- -- -- -- -- -- --   10/07/23 1506 -- -- -- -- -- -- 1.626 m (5' 4\") 89.1 kg (196 lb 6.4 oz)   10/07/23 1501 (!) 178/87 98.8  F (37.1  C) Oral -- -- -- -- --   ]   Gen: Well-appearing.  Tearful during conversation.  CV: RRR, no murmurs/rubs/gallops  Pulm: CTAB, no increased work of breathing, no wheezing/rhonchi/crackles  Abd: non-tender, non-distended, +BS. CS incision with overlying steri strips appears clean, dry and intact. No surrounding erythema, induration or drainage.  Extremities: non-tender, no erythema; 2+ BLE edema bilaterally    Labs:  Results for orders placed or " performed during the hospital encounter of 10/07/23   Chest XR,  PA & LAT     Status: None    Narrative    EXAM: XR CHEST 2 VIEWS  LOCATION: [facility]  DATE: 10/7/2023    INDICATION: Dyspnea  COMPARISON: None.      Impression    IMPRESSION: Negative chest.   POC US ECHO LIMITED     Status: None    Impression    Limited Bedside Cardiac Ultrasound, performed and interpreted by me.   Indication: Shortness of Breath.  Parasternal long axis, parasternal short axis, apical 4 chamber, and subcostal views were acquired.   Image quality was satisfactory.    Findings:    Abnormal -grossly preserved left ventricular ejection fraction.  No visualized RV dilation.  Small pericardial effusion, no visualized tamponade physiology.    IMPRESSION: Abnormal limited cardiac ultrasound showing grossly preserved left ventricular ejection fraction.  No visualized RV dilation or strain..  Small pericardial effusion without tamponade.       Comprehensive metabolic panel     Status: Abnormal   Result Value Ref Range    Sodium 137 135 - 145 mmol/L    Potassium 3.9 3.4 - 5.3 mmol/L    Carbon Dioxide (CO2) 25 22 - 29 mmol/L    Anion Gap 10 7 - 15 mmol/L    Urea Nitrogen 9.9 6.0 - 20.0 mg/dL    Creatinine 0.62 0.51 - 0.95 mg/dL    GFR Estimate >90 >60 mL/min/1.73m2    Calcium 8.9 8.6 - 10.0 mg/dL    Chloride 102 98 - 107 mmol/L    Glucose 138 (H) 70 - 99 mg/dL    Alkaline Phosphatase 198 (H) 35 - 104 U/L    AST 41 0 - 45 U/L    ALT 15 0 - 50 U/L    Protein Total 6.4 6.4 - 8.3 g/dL    Albumin 3.2 (L) 3.5 - 5.2 g/dL    Bilirubin Total 0.2 <=1.2 mg/dL   Nt probnp inpatient (BNP)     Status: Abnormal   Result Value Ref Range    N terminal Pro BNP Inpatient 717 (H) 0 - 450 pg/mL   Troponin T, High Sensitivity     Status: Normal   Result Value Ref Range    Troponin T, High Sensitivity 8 <=14 ng/L   Protein  random urine     Status: Abnormal   Result Value Ref Range    Total Protein Urine mg/dL 14.7 (H)    mg/dL    Total Protein Urine mg/mg Creat 0.69 (H) 0.00 - 0.20 mg/mg Cr    Creatinine Urine mg/dL 21.2 mg/dL   INR     Status: Normal   Result Value Ref Range    INR 0.85 0.85 - 1.15   CBC with platelets and differential     Status: Abnormal   Result Value Ref Range    WBC Count 8.8 4.0 - 11.0 10e3/uL    RBC Count 3.29 (L) 3.80 - 5.20 10e6/uL    Hemoglobin 9.7 (L) 11.7 - 15.7 g/dL    Hematocrit 29.5 (L) 35.0 - 47.0 %    MCV 90 78 - 100 fL    MCH 29.5 26.5 - 33.0 pg    MCHC 32.9 31.5 - 36.5 g/dL    RDW 13.4 10.0 - 15.0 %    Platelet Count 277 150 - 450 10e3/uL    % Neutrophils 70 %    % Lymphocytes 20 %    % Monocytes 5 %    % Eosinophils 3 %    % Basophils 0 %    % Immature Granulocytes 2 %    Absolute Neutrophils 6.2 1.6 - 8.3 10e3/uL    Absolute Lymphocytes 1.8 0.8 - 5.3 10e3/uL    Absolute Monocytes 0.4 0.0 - 1.3 10e3/uL    Absolute Eosinophils 0.3 0.0 - 0.7 10e3/uL    Absolute Basophils 0.0 0.0 - 0.2 10e3/uL    Absolute Immature Granulocytes 0.1 <=0.4 10e3/uL   Manual Differential     Status: None   Result Value Ref Range    % Neutrophils 70 %    % Lymphocytes 18 %    % Monocytes 5 %    % Eosinophils 6 %    % Basophils 1 %    Absolute Neutrophils 6.2 1.6 - 8.3 10e3/uL    Absolute Lymphocytes 1.6 0.8 - 5.3 10e3/uL    Absolute Monocytes 0.4 0.0 - 1.3 10e3/uL    Absolute Eosinophils 0.5 0.0 - 0.7 10e3/uL    Absolute Basophils 0.1 0.0 - 0.2 10e3/uL    RBC Morphology Confirmed RBC Indices     Platelet Assessment  Automated Count Confirmed. Platelet morphology is normal.     Automated Count Confirmed. Platelet morphology is normal.   CBC with platelets differential     Status: Abnormal    Narrative    The following orders were created for panel order CBC with platelets differential.  Procedure                               Abnormality         Status                     ---------                               -----------         ------                     CBC with platelets and d...[[id number]]  Abnormal            Final  result               Manual Differential[[id number]]                              Final result                 Please view results for these tests on the individual orders.     A&P: Lauren Goodwin is a 33 year old  who is POD#3 s/p PLTCS for cat II FHT, RFD.  Pregnancy/hospital course was notable for preeclampsia with severe features met by blood pressure criteria.  While inpatient she received a 24-hour course of magnesium for seizure prophylaxis.  Patient was discharged yesterday at which time her blood pressures were normotensive.  Prior to discharge she was offered but declined initiation of long-acting blood pressure medication as she wanted to continue monitoring blood pressures at home.  Upon returning home patient states that she took her blood pressures this morning and they were found to be in the 160s-170s over 90s-100s.  She called the nursing line and was advised to present to the ED for further evaluation.  Here in the ED patient states that she has been experiencing slight shortness of breath.  She is otherwise asymptomatic.  Vitals are notable for hypertension as above.  Remainder of vitals are within normal limits.  Plan will be for immediate acting blood pressure medication while in the ED, labs, admission for blood pressure monitoring and management.  Explained this to patient who is in agreement with the plan.    Preeclampsia with severe features  - Continue Serial BP monitoring   - IV Antihypertensives prn for sustained severe range blood pressures (>160/>110)  - D#1 Nifedipine 30mg   - Will consider titration of nifedipine due to persistent high mild range blood pressures  - Labs: HELLP labs wnl  - Daily weights, strict I&Os   - Magnesium: Given patient received magnesium course in the immediate postpartum period will not plan on readministration of magnesium during this admission.     SOB  - endorsed by patient on admission  - Bedside Echo on admission notable for preserved ejection  fraction, small pericardial effusion  - CXR negative    DM Type I  - Continue use of home insulin pump  - Inpatient Endocrinology consult ordered, appreciate recommendations    Hypothyroidism  - Continue PTA levothyroxine    Postpartum management  Pain: Well-controlled with ibuprofen, tylenol, and oxycodone PRN   GI:  PRN bowel regimen, anti-emetics  : Voiding spontaneously  Rh: Positive  Feed:  Breast feeding  Infant:  Stable in room with patient, partner in room   PPx:  Encourage ambulation    Patient care managed under the supervision of Dr. Linda Mercado DO, MS  Obstetrics, Gynecology & Women's Health   Resident, PGY-3  10/07/2023 8:16 PM      Physician Attestation   I personally examined and evaluated this patient.  I discussed the patient with the resident/fellow and care team, and agree with the assessment and plan of care as documented in the note.     Key findings:  POD#3 s/p PLTCS for category 2 FHT remote from delivery, now readmitted with sustained severe range BPs for blood pressure medication titration inpatient.  IV labetalol 20mg given in ED with resulting mild range blood pressures.  Starting nifedipine 30mg XL now.  Patient with 2+ bilateral lower extremity edema.  Will give single dose of lasix 20mg IV.  Patient reports her shortness of breath and generalized unwell feeling is resolved.  Patient strongly desires to continued self management of home insulin pump.  Endocrine consulted to help facilitate this.    Linda Stephenson MD  Date of Service (when I saw the patient): 10/7/23

## 2023-10-08 LAB
ALBUMIN UR-MCNC: NEGATIVE MG/DL
APPEARANCE UR: CLEAR
ATRIAL RATE - MUSE: 166 BPM
BILIRUB UR QL STRIP: NEGATIVE
COLOR UR AUTO: ABNORMAL
DIASTOLIC BLOOD PRESSURE - MUSE: NORMAL MMHG
GLUCOSE BLDC GLUCOMTR-MCNC: 114 MG/DL (ref 70–99)
GLUCOSE BLDC GLUCOMTR-MCNC: 84 MG/DL (ref 70–99)
GLUCOSE UR STRIP-MCNC: NEGATIVE MG/DL
HGB UR QL STRIP: ABNORMAL
INTERPRETATION ECG - MUSE: NORMAL
KETONES UR STRIP-MCNC: NEGATIVE MG/DL
LEUKOCYTE ESTERASE UR QL STRIP: NEGATIVE
MUCOUS THREADS #/AREA URNS LPF: PRESENT /LPF
NITRATE UR QL: NEGATIVE
P AXIS - MUSE: 58 DEGREES
PH UR STRIP: 7 [PH] (ref 5–7)
PR INTERVAL - MUSE: NORMAL MS
QRS DURATION - MUSE: 70 MS
QT - MUSE: 398 MS
QTC - MUSE: 398 MS
R AXIS - MUSE: 70 DEGREES
RBC URINE: <1 /HPF
SP GR UR STRIP: 1.01 (ref 1–1.03)
SQUAMOUS EPITHELIAL: 1 /HPF
SYSTOLIC BLOOD PRESSURE - MUSE: NORMAL MMHG
T AXIS - MUSE: 60 DEGREES
UROBILINOGEN UR STRIP-MCNC: NORMAL MG/DL
VENTRICULAR RATE- MUSE: 60 BPM
WBC URINE: <1 /HPF

## 2023-10-08 PROCEDURE — 250N000013 HC RX MED GY IP 250 OP 250 PS 637

## 2023-10-08 PROCEDURE — 99232 SBSQ HOSP IP/OBS MODERATE 35: CPT | Performed by: PHYSICIAN ASSISTANT

## 2023-10-08 PROCEDURE — 120N000002 HC R&B MED SURG/OB UMMC

## 2023-10-08 PROCEDURE — 250N000011 HC RX IP 250 OP 636: Mod: JZ

## 2023-10-08 RX ORDER — NIFEDIPINE 30 MG/1
30 TABLET, EXTENDED RELEASE ORAL EVERY EVENING
Status: DISCONTINUED | OUTPATIENT
Start: 2023-10-08 | End: 2023-10-09 | Stop reason: HOSPADM

## 2023-10-08 RX ORDER — NIFEDIPINE 30 MG/1
60 TABLET, EXTENDED RELEASE ORAL DAILY
Status: DISCONTINUED | OUTPATIENT
Start: 2023-10-09 | End: 2023-10-09 | Stop reason: HOSPADM

## 2023-10-08 RX ORDER — NIFEDIPINE 30 MG/1
30 TABLET, EXTENDED RELEASE ORAL 2 TIMES DAILY
Status: DISCONTINUED | OUTPATIENT
Start: 2023-10-08 | End: 2023-10-08

## 2023-10-08 RX ADMIN — NIFEDIPINE 30 MG: 30 TABLET, FILM COATED, EXTENDED RELEASE ORAL at 08:38

## 2023-10-08 RX ADMIN — LEVOTHYROXINE SODIUM 50 MCG: 50 TABLET ORAL at 08:38

## 2023-10-08 RX ADMIN — SENNOSIDES AND DOCUSATE SODIUM 2 TABLET: 50; 8.6 TABLET ORAL at 01:59

## 2023-10-08 RX ADMIN — NIFEDIPINE 30 MG: 30 TABLET, FILM COATED, EXTENDED RELEASE ORAL at 13:55

## 2023-10-08 RX ADMIN — ACETAMINOPHEN 975 MG: 325 TABLET, FILM COATED ORAL at 10:34

## 2023-10-08 RX ADMIN — NIFEDIPINE 30 MG: 30 TABLET, FILM COATED, EXTENDED RELEASE ORAL at 20:24

## 2023-10-08 RX ADMIN — ACETAMINOPHEN 975 MG: 325 TABLET, FILM COATED ORAL at 04:09

## 2023-10-08 RX ADMIN — ACETAMINOPHEN 975 MG: 325 TABLET, FILM COATED ORAL at 20:30

## 2023-10-08 RX ADMIN — KETOROLAC TROMETHAMINE 30 MG: 30 INJECTION, SOLUTION INTRAMUSCULAR; INTRAVENOUS at 05:16

## 2023-10-08 RX ADMIN — SENNOSIDES AND DOCUSATE SODIUM 1 TABLET: 50; 8.6 TABLET ORAL at 20:24

## 2023-10-08 RX ADMIN — SENNOSIDES AND DOCUSATE SODIUM 1 TABLET: 50; 8.6 TABLET ORAL at 08:38

## 2023-10-08 ASSESSMENT — ACTIVITIES OF DAILY LIVING (ADL)
ADLS_ACUITY_SCORE: 35

## 2023-10-08 NOTE — PROGRESS NOTES
Received a call from the primary team regarding help with Glycemic management for Bruno De Jesus. Patient is known to Inpatient Diabetes service. Was seen as initial consult on 3 rd October 2023 , for management of her Type 1 DM. She follows with  at [place].     She is on Tandem T slim with control IQ. Uses Humolog in the pump.     Currently on following settings:   Basal rate  Rate #1: 0.7 units/hr 4389-6895  Rate #2 : 0.75 units/hr 9977-5507  Rate #3: 0.45 units/hr 9533-8704  Rate #4: 0.20 units/hr 3252-3027-  Rate #5: 0.9 units/hr 4690-7001     Sensitivity or correction factor:40( she changed it after discharge)   Carb ratio: decrease to 1:12              -BG monitoring TID ac/ hs//0200/0500        She is admitted for management of Pre-eclampsia. No concerns of mental status changes and states that she can use the insulin pump. Random glucose on [facility]   from 3:30 Pm was 130;     Recommendation:     To continue her Insulin Pump with following settings     Basal rate  Rate #1: 0.7 units/hr 0023-6041  Rate #2 : 0.75 units/hr 4149-9449  Rate #3: 0.45 units/hr 4275-0045  Rate #4: 0.20 units/hr 6186-5811-  Rate #5: 0.9 units/hr 3727-5594     Sensitivity or correction factor:40( she changed it after discharge)   Carb ratio: decrease to 1:12              -BG monitoring TID ac/ hs//0200/0500  -Hypoglycemia protocol       Inpatient diabetes service team to follow the patient tomorrow. Please place a formal consult order.       Discussed with on call attending.     Milind Plata MD

## 2023-10-08 NOTE — CONSULTS
IP Diabetes Management  Daily Note          HPI: Lauren is a 33 year old female with a PMHx  at 36w3d with a past medical history of type 1 diabetes (noted to be within good control this pregnancy)  and hypothyroidism who was admitted on 10/2/2023 for IOL- noted concerns for pre-eclampsia. She delivered on 10/4/2023, post  section. Discharged on 10/6/2023. Readmitted 10/7/2023  with preeclampsia.     Re consult for pump on admission(pt with home insulin pump. readmitted w/ pp pre-eclampsia ).  Had just seen patient so did not repeat consult, just progress note     Assessment:   1.Type 1 diabetes now in post partum period with good control   2. Starting to breastfeed/pump, supplement with formula  3. Pre eclmapsia   Plan:               -Continue insulin pump  Tandem T-slim running in control IQ  Basal rates:  Rate #1: 0.7 units/hr 7332-9467  Rate #2 : 0.75 units/hr 6740-5632  Rate #3: 0.45 units/hr 6039-4080  Rate #4: 0.4 units/hr 5846-8368-  Rate #5: 0.9 units/hr 1057-4749     Sensitivity or correction factor: 1:40-patient self decreased to 1/40  Carb ratio: Continue 1:12              -BG monitoring TID ac/ hs//0200/0500              -Please contact endocrinology if BG >240 for 4 hours- patient has type 1 diabetes and would be at risk for life threatening DKA              -hypoglycemia protocol              -carb counting protocol        Discharge Planning:               -Tentative diabetic regimen: Insulin pump              -diabetes education needs: none  -Outpatient follow up: Anali Lehman MD- [facility].  She has [facility] [place] phone numbers in case she cannot get a hold of Dr Mera and is having low BG or BG >250  -Test claim: none needed at this time     Plan discussed with patient in person, bedside RN in person,   primary team present when speaking with RN.      Interval History and Assessment: interval glucose trend reviewed:   - BG remain in tight control --had . patient appreciates  tight control. Targets set on pump 100-130-- if more low >100then will decrease ISF back to 1:50 or decrease carb coverage  Aware breast feeding micheletg could cause lower BG has not noticed any low BG post breast feeding.  She has fruit roll ups to treat lows.  84 is lowest.  Waiting for lunch to come with family.  No headache.  No n,v,d  Patient notes she has extra supplies if needed- she is very savvy with her pump.       She denies nausea, vomiting or diarrhea. B     Labs:10/7  Creatinine: 0.62  eGFR:>90  Hgb=8.9  C02=25 and AG=10     Current nutritional intake and type: Orders Placed This Encounter      Advance Diet as Tolerated: Regular Diet Adult      Diet        Planned Procedures/surgeries:   Steroid planning: none  D5W-containing solutions/medications: order for d5% but not currently running     PTA Diabetes Regimen:   Setting on discharge on 10/6/2023  Continue insulin pump  Tandem T-slim running in control IQ  Basal rates:  Rate #1: 0.7 units/hr 0642-7051  Rate #2 : 0.75 units/hr 6947-0360  Rate #3: 0.45 units/hr 8551-1909  Rate #4: 0.4 units/hr 4483-7843-  Rate #5: 0.9 units/hr 7509-3235     Sensitivity or correction factor: 1:50-  Carb ratio: Continue 1:12    Tandem Insulin pump- control IQ while pregnant  Basal Rates and Start/End times:   Rate #1 = 1.25 units/hr from 0000 to 1100   Rate #2 = 0.75 units/hr from 1100 to 1500.   Rate #3 = 0.25 units/hr from 1500 to 1930   Rate #4 = 1.5 units/hr from 1930 to 0000.   Bolus settings:  ISF 25  CHO 1:6.5        Prepregnancy:  Basal rates:  Rate #1: 0.7 units/hr 7739-7501  Rate #2 : 1.35 units/hr 7440-1577  Rate #3: 0.85 units/hr 3391-4938  Rate #4: 0.6 units/hr 5212-1811  Rate #5: 1.4 units/hr 1930-000     ISF 35  CHO 1:10            Diabetes History:   Type of Diabetes: Type 1 Diabetes Mellitus        Lab Results   Component Value Date     A1C 5.1 10/03/2023     A1C 7.6 2019     A1C 7.8 2018     A1C 7.3 07/10/2018     A1C 8.4 2018  "              Review of Systems:      The Review of Systems is negative other than noted in the Interval History.           Medications:    see MAR        Physical Exam:    Blood pressure (!) 141/95, pulse 90, temperature 98  F (36.7  C), resp. rate 18, height 1.626 m (5' 4\"), weight 84.9 kg (187 lb 2.7 oz), last menstrual period 01/18/2023, SpO2 98 %, currently breastfeeding.     General: pleasant, in no distress. Walking around room  HEENT: normocephalic, atraumatic. Oral mucous membranes moist.   Lungs: unlabored respiration, no cough  ABD: rounded  Skin: warm and dry, no obvious lesions- very limited assessment  MSK:  moves all extremities  Mental status:  alert, oriented to self, place, time  Psych:  bright affect, calm and appropriate interaction   Can see pump on belt             Data:               Recent Labs   Lab 10/06/23  0455 10/05/23  2209 10/05/23  1755 10/05/23  1344 10/05/23  0845 10/05/23  0502   * 119* 146* 100* 112* 93               Lab Results   Component Value Date     WBC 14.8 (H) 10/03/2023     WBC 10.1 10/02/2023     WBC 10.8 07/17/2021     HGB 8.9 (L) 10/05/2023     HGB 8.9 (L) 10/04/2023     HGB 11.3 (L) 10/04/2023     HCT 39.3 10/03/2023     HCT 34.6 (L) 10/02/2023     HCT 42.8 07/17/2021     MCV 90 10/03/2023     MCV 86 10/02/2023     MCV 90 07/17/2021      10/04/2023      10/04/2023      10/03/2023            Lab Results   Component Value Date      (L) 10/04/2023      10/02/2023      07/17/2021     POTASSIUM 4.0 10/04/2023     POTASSIUM 4.0 10/02/2023     POTASSIUM 3.5 07/17/2021     CHLORIDE 101 10/04/2023     CHLORIDE 106 10/02/2023     CHLORIDE 102 07/17/2021     CO2 16 (L) 10/04/2023     CO2 21 (L) 10/02/2023     CO2 26 07/17/2021      (H) 10/06/2023      (H) 10/05/2023      (H) 10/05/2023            Lab Results   Component Value Date     BUN 18.9 10/04/2023     BUN 16.3 10/02/2023     BUN 13 07/17/2021          "   Lab Results   Component Value Date     TSH 5.73 (H) 07/10/2018            Lab Results   Component Value Date     AST 22 10/04/2023     AST 32 10/04/2023     AST 30 10/03/2023     ALT 5 10/04/2023     ALT 9 10/04/2023     ALT 10 10/03/2023     ALKPHOS 173 (H) 10/02/2023     ALKPHOS 65 07/17/2021         I spent a total of 35 minutes face to face or coordinating care of Lauren Goodwin.                  Over 50% of my time on the unit was spent counseling the patient and/or coordinating care regarding acute hyperglycemia management.  See note for details.        Contacting the Inpatient Diabetes Team   From 7AM-5PM: page inpatient diabetes provider that is following the patient, or utilize the job code paging system. From 5PM-7AM: page the job code for endocrine fellow on call.      Please use the following job code to reach the Inpatient Diabetes team. Note that you must use an in house phone and that job codes cannot receive text pages.   Dial [phone] (or star-star-star [phone] on the new [employer] telephones), then [phone] to reach the endocrine-diabetes provider on call.     Nicolette Bell PA-C  Inpatient Diabetes Service  Pager   [phone]  Date of Service: 10/8/2023

## 2023-10-08 NOTE — PLAN OF CARE
[id number]  Readmit for elevated BP. Currently stable. Denies HA, vision changes, SOB, RUQ pain. Will continue with poc.

## 2023-10-08 NOTE — PROVIDER NOTIFICATION
10/08/23 0742   Provider Notification   Provider Name/Title Atif   Method of Notification Electronic Page   Request Evaluate-Remote   Notification Reason Medication Request     Sorry so early, 7131 has two orders for nifedapine 30 mg due at 8 am. Did you want only 30? or 60? Please clarify. Thank you.

## 2023-10-08 NOTE — DISCHARGE SUMMARY
[facility] – [facility] of the [facility]   Gynecology Discharge Summary      Patient: Lauren Goodwin    YOB: 1990   MRN# [id number]           Date of Admission:  10/7/2023  Date of Discharge::  10/9/2023  Admitting Physician:  Linda Stephenson MD  Discharge Physician:  Maty Stroud MD              Admission Diagnoses:   - Pre-eclampsia with severe features with persistent postpartum hypertension  - DM Type I  - Hypothyroidism          Discharge Diagnosis:   - Same          Admission History   Lauren Goodwin is a 33 year old  who presented on POD#3 s/p PLTCS for cat II FHT, RFD. Pregnancy/hospital course was notable for preeclampsia with severe features met by blood pressure criteria. While inpatient she received a 24-hour course of magnesium for seizure prophylaxis.  Patient was discharged 10/6 at which time her blood pressures were normotensive.  Prior to discharge she was offered but declined initiation of long-acting blood pressure medication as she wanted to continue monitoring blood pressures at home  Upon returning home, patient states that she took her blood pressure and they were found to be in the 160s-170s over 90s-100s.  She called the nursing line and was advised to present to the ED for further evaluation. In the ED patient states that she has been experiencing slight shortness of breath.  She is otherwise asymptomatic. Vitals are notable for hypertension. Remainder of vitals within normal limits. Plan will be for immediate acting blood pressure medication while in the ED, labs, admission for blood pressure monitoring and management. Explained this to patient who is in agreement with the plan.           Hospital Course   On admission patient was started on 30mg Nifedipine for blood pressure management. Workup for SOB  with echo, CXR was unremarkable. She did receive a 1 time dose of 20mg lasix. Her blood pressures continued to be elevated and thus  she was uptitrated on Nifedipine to 60 mg in the AM and 30 mg at night. On HD#3 after two days on a total of 90 mg of Nifedipine, her blood pressures were relatively well controlled with only a few in the mildly elevated range. She continued to do well from a postoperative/post  stand point. On HD#3 she was meeting goals for discharge home.            Discharge Instructions and Follow-Up:     Discharge activity: - No lifting >15 lbs or strenuous exercise for 6 weeks  - No driving while taking narcotics or until you can slam on the breaks without pain   Discharge follow-up: Follow up with your scheduled appointment this coming Friday (10/13). If you start to notice blood pressures <100/60 or are becoming lightheaded or dizzy with blood pressures <110/70, decrease your blood pressure medicine (from 90 a day to 60 a day, stop the 30mg dose)   Return Precautions: Patient was instructed to call or return to ED for any of the following:                - Sustained blood pressures >160/110               - Headache not improved with tylenol or ibuprofen               - RUQ pain               - Vision changes such as blurry vision or spots in your vision   - Temperature greater than 100.4F   - Pain not controlled by pain medications   - Uncontrolled nausea/vomiting   - Foul-smelling vaginal discharge   - Vaginal bleeding soaking 1 pad per hour for 2 hours in a row       Review of your medicines        START taking        Dose / Directions   * NIFEdipine ER 30 MG 24 hr tablet  Commonly known as: ADALAT CC      Dose: 30 mg  Take 1 tablet (30 mg) by mouth every evening  Quantity: 30 tablet  Refills: 1     * NIFEdipine ER 60 MG 24 hr tablet  Commonly known as: ADALAT CC      Dose: 60 mg  Take 1 tablet (60 mg) by mouth daily  Quantity: 30 tablet  Refills: 1           * This list has 2 medication(s) that are the same as other medications prescribed for you. Read the directions carefully, and ask your doctor or other care  provider to review them with you.                CONTINUE these medicines which have NOT CHANGED        Dose / Directions   acetaminophen 325 MG tablet  Commonly known as: TYLENOL  Used for: S/P  section      Dose: 650 mg  Take 2 tablets (650 mg) by mouth every 6 hours as needed for mild pain Start after Delivery.  Quantity: 100 tablet  Refills: 0     BLOOD GLUCOSE TEST STRIPS Strp  Used for: Type 1 diabetes mellitus without complication (H)      [BLOOD GLUCOSE TEST (CONTOUR NEXT TEST STRIPS) STRIPS] Test up to 6 times daily  Quantity: 200 strip  Refills: 5     ferrous sulfate 325 (65 Fe) MG EC tablet  Commonly known as: FE TABS  Used for: S/P  section      Dose: 325 mg  Take 1 tablet (325 mg) by mouth every other day  Quantity: 90 tablet  Refills: 1     ibuprofen 600 MG tablet  Commonly known as: ADVIL/MOTRIN  Used for: S/P  section      Dose: 600 mg  Take 1 tablet (600 mg) by mouth every 6 hours as needed for moderate pain Start after delivery  Quantity: 60 tablet  Refills: 0     insulin lispro 100 UNIT/ML vial  Commonly known as: HumaLOG VIAL      Use up to 50 units daily in insulin pump  Refills: 0     levothyroxine 50 MCG tablet  Commonly known as: SYNTHROID/LEVOTHROID  Indication: Underactive Thyroid      Dose: 50 mcg  Take 50 mcg by mouth daily  Refills: 0     senna-docusate 8.6-50 MG tablet  Commonly known as: SENOKOT-S/PERICOLACE  Used for: S/P  section      Dose: 1 tablet  Take 1 tablet by mouth daily Start after delivery.  Quantity: 100 tablet  Refills: 0               Where to get your medicines        These medications were sent to [facility] [address]  [address] [address]      Phone: [phone]   NIFEdipine ER 30 MG 24 hr tablet  NIFEdipine ER 60 MG 24 hr tablet                Discharge Disposition:     Discharged to home      Nataliya Hubbard MD  Obstetrics and Gynecology, PGY-1  10/09/23 10:14 AM

## 2023-10-08 NOTE — PLAN OF CARE
Goal Outcome Evaluation: Vital signs stable, had one moderate range pressure during shift. MD aware and titrating meds.Reflexes and clonus WDL. Denying any severe features of pre-e.  Denying pain, tylenol given and patient aware toradol is available. Patient ambulating independently. Glucose monitoring WDL, patient administering insulin via ambulatory pump.  and infant in room with patient.       Plan of Care Reviewed With: patient    Overall Patient Progress: improvingOverall Patient Progress: improving

## 2023-10-08 NOTE — PROVIDER NOTIFICATION
"   10/08/23 1301   Provider Notification   Provider Name/Title Atif   Method of Notification Electronic Page   Request Evaluate-Remote   Notification Reason Vital Signs Change;Status Update     FYI patient's most recent /95. Denying any severe features, \"feeling much better than yesterday.\" Please advise if you want to titrate dose or just continue to monitor q4  "

## 2023-10-08 NOTE — PROVIDER NOTIFICATION
10/07/23 2000   Provider Notification   Provider Name/Title Jess     Pt transferred from ED, need new orders.

## 2023-10-08 NOTE — PROGRESS NOTES
[facility]  Daily Rounding Progress Note    S:  Patient reports feeling overall well this morning. She was able to eat dinner last night without nausea or vomiting.. Denies headache, vision changes, chest pain, shortness of breath, RUQ pain, increased edema. Has questions this morning regarding plan for observation/monitoring and subsequent discharge home. No other complaints    O:  Patient Vitals for the past 24 hrs:   BP Temp Temp src Pulse Resp SpO2 Height Weight   10/08/23 0600 (!) 134/90 97.9  F (36.6  C) Oral 82 16 -- -- --   10/08/23 0150 138/88 97.9  F (36.6  C) Oral -- 16 -- -- --   10/07/23 2159 (!) 150/84 98.3  F (36.8  C) Oral 68 16 -- -- 88 kg (194 lb 0.1 oz)   10/07/23 2058 (!) 155/86 -- -- 66 -- -- -- --   10/07/23 2022 (!) 146/85 -- -- 67 -- -- -- --   10/07/23 2007 (!) 157/85 99.1  F (37.3  C) Oral 68 18 -- -- --   10/07/23 1910 (!) 151/106 -- -- 74 -- -- -- --   10/07/23 1900 (!) 150/83 -- -- 69 -- -- -- --   10/07/23 1850 (!) 153/84 -- -- 66 -- -- -- --   10/07/23 1845 (!) 158/87 -- -- -- -- -- -- --   10/07/23 1840 (!) 158/87 -- -- 66 -- -- -- --   10/07/23 1830 (!) 147/87 -- -- 65 -- -- -- --   10/07/23 1820 (!) 155/88 -- -- 67 -- -- -- --   10/07/23 1810 (!) 149/88 -- -- 69 -- -- -- --   10/07/23 1721 -- -- -- 68 17 98 % -- --   10/07/23 1720 (!) 159/88 -- -- -- -- -- -- --   10/07/23 1710 (!) 153/88 -- -- 69 11 97 % -- --   10/07/23 1640 (!) 149/83 -- -- 63 19 96 % -- --   10/07/23 1620 (!) 159/90 -- -- 67 12 98 % -- --   10/07/23 1616 (!) 159/90 -- -- 69 18 97 % -- --   10/07/23 1606 (!) 161/85 -- -- 69 10 95 % -- --   10/07/23 1601 (!) 154/86 -- -- 62 16 98 % -- --   10/07/23 1600 (!) 154/86 -- -- 70 12 99 % -- --   10/07/23 1545 (!) 157/98 -- -- 64 10 99 % -- --   10/07/23 1531 (!) 162/92 -- -- 60 18 99 % -- --   10/07/23 1530 (!) 164/92 -- -- 70 12 99 % -- --   10/07/23 1509 -- -- Oral 71 16 98 % -- --   10/07/23 1508 (!) 178/91 -- -- -- -- -- -- --   10/07/23  "1506 -- -- -- -- -- -- 1.626 m (5' 4\") 89.1 kg (196 lb 6.4 oz)   10/07/23 1501 (!) 178/87 98.8  F (37.1  C) Oral -- -- -- -- --     Wt Readings from Last 4 Encounters:   10/07/23 88 kg (194 lb 0.1 oz)   10/06/23 91.2 kg (201 lb 1 oz)     Gen:  Resting comfortably, NAD  CV:  RRR  Pulm:  NWOB, CTAB  Abd:  Soft, non-tender, gravid  Ext:  Non-tender, 1+ LE edema b/l    UOP:  Intake/Output Summary (Last 24 hours) at 10/8/2023 0721  Last data filed at 10/8/2023 0520  Gross per 24 hour   Intake 1200 ml   Output 6750 ml   Net -5550 ml         PreE Labs:  Recent Labs   Lab Test 10/07/23  1530 10/05/23  0756 10/04/23  2136   HGB 9.7* 8.9* 8.9*     Recent Labs   Lab Test 10/07/23  1530 10/04/23  2136 10/04/23  0740    171 168     Recent Labs   Lab Test 10/07/23  1530 10/04/23  2136 10/04/23  0740   ALT 15 5 9     Recent Labs   Lab Test 10/07/23  1530 10/04/23  2136 10/04/23  0740   AST 41 22 32     Recent Labs   Lab Test 10/07/23  1530 10/05/23  0756 10/04/23  2136   CR 0.62 0.99* 1.17*     Recent Labs   Lab Test 10/05/23  0155 10/04/23  2136 10/03/23  2324   MAG 6.5* 8.6* 7.6*     A/P:  Lauren Goodwin is a 33 year old  who is HD#2 re-admitted for management of severe range blood pressures in the setting of  pre-eclampsia with severe features. Patient is POD#4 s/p PLTCS for Cat II FHT.  Initial postpartum course was notable for administration of 24 hours of magnesium for seizure prophylaxis and monitoring of blood pressure.  On day of discharge patient was found to be normotensive.  Initially offered initiation of long-acting antihypertensive but declined.  She represented to the hospital yesterday after having severe range blood pressures at home.  She was admitted for observation/management of blood pressures.  She is doing well this morning.    # Preeclampsia with severe features  - Continue Serial BP monitoring. Blood pressures overnight mild range  - s/p IV labetalol 20. IV Antihypertensives prn for " sustained severe range blood pressures (>160/>110)  - D#2 Nifedipine 30mg   - Will consider titration of nifedipine if persistent high mild range blood pressures  - Labs: HELLP labs wnl (10/7)  - Daily weights, strict I&Os   - Magnesium: Given patient received magnesium course in the immediate postpartum period will not plan on readministration of magnesium during this admission.      # SOB  - endorsed by patient on admission. Patient reports overnight this has improved.   - s/p 1 dose lasix  - Bedside Echo on admission notable for preserved ejection fraction, small pericardial effusion  - CXR negative     # DM Type I  - Continue use of home insulin pump  - s/p Inpatient Endocrinology consult  appreciate recommendations.     # Hypothyroidism  - Continue PTA levothyroxine     # Postpartum management  Pain:     Well-controlled with ibuprofen, tylenol, and oxycodone PRN   GI:         PRN bowel regimen, anti-emetics  :       Voiding spontaneously  Rh:        Positive  Feed:     Breast feeding  Infant:   Stable in room with patient, partner in room   PPx:      Encourage ambulation, IS, SCDs while confined to bed    Desiree Mercado DO, MS  Obstetrics, Gynecology & Women's Health   Resident, PGY-3  10/08/2023 7:20 AM      Physician Attestation   I personally examined and evaluated this patient.  I discussed the patient with the resident/fellow and care team, and agree with the assessment and plan of care as documented in the note.     Key findings: 33 year old  on POD 4 s/p PLTCS for cat 2 tracing in setting of pre-E with severe features, readmitted on POD 3 for severely elevated blood pressures. BPs mildly elevated. Patient reports feeling a lot better. Significant diuresis after 1 dose of lasix. Titrating up nifedipine, will give a second dose of 30mg this afternoon and plan to start 60mg tomorrow morning. Plan inpatient monitoring overnight and discharge to home tomorrow if BPs well controlled.      Please see A&P for additional details of medical decision making.    I have personally reviewed the following data over the past 24 hrs:    8.8  \   9.7 (L)   / 277     137 102 9.9 /  114 (H)   3.9 25 0.62 \     ALT: 15 AST: 41 AP: 198 (H) TBILI: 0.2   ALB: 3.2 (L) TOT PROTEIN: 6.4 LIPASE: N/A     Trop: 8 BNP: 717 (H)     INR:  0.85 PTT:  N/A   D-dimer:  N/A Fibrinogen:  N/A         Katelin Marrero MD  Date of Service (when I saw the patient): 10/08/23

## 2023-10-08 NOTE — PLAN OF CARE
Vitals stable. Lungs clear. Has normal reflexes & no clonus. Up ad norman. Denies dizziness. Denies HA, visual disturbances & URQ discomfort. Voiding large amounts after Lasix. Will continue to monitor.     Goal Outcome Evaluation: Improving      Plan of Care Reviewed With: patient, spouse

## 2023-10-09 VITALS
OXYGEN SATURATION: 98 % | WEIGHT: 179.3 LBS | BODY MASS INDEX: 30.61 KG/M2 | DIASTOLIC BLOOD PRESSURE: 94 MMHG | HEIGHT: 64 IN | RESPIRATION RATE: 16 BRPM | TEMPERATURE: 98.2 F | SYSTOLIC BLOOD PRESSURE: 144 MMHG | HEART RATE: 81 BPM

## 2023-10-09 LAB
GLUCOSE BLDC GLUCOMTR-MCNC: 130 MG/DL (ref 70–99)
GLUCOSE BLDC GLUCOMTR-MCNC: 77 MG/DL (ref 70–99)
GLUCOSE BLDC GLUCOMTR-MCNC: 84 MG/DL (ref 70–99)
GLUCOSE BLDC GLUCOMTR-MCNC: 90 MG/DL (ref 70–99)

## 2023-10-09 PROCEDURE — 250N000013 HC RX MED GY IP 250 OP 250 PS 637: Performed by: OBSTETRICS & GYNECOLOGY

## 2023-10-09 PROCEDURE — 250N000013 HC RX MED GY IP 250 OP 250 PS 637

## 2023-10-09 PROCEDURE — 99238 HOSP IP/OBS DSCHRG MGMT 30/<: CPT | Mod: 24 | Performed by: OBSTETRICS & GYNECOLOGY

## 2023-10-09 PROCEDURE — 99232 SBSQ HOSP IP/OBS MODERATE 35: CPT | Performed by: PHYSICIAN ASSISTANT

## 2023-10-09 RX ORDER — NIFEDIPINE 30 MG
30 TABLET, EXTENDED RELEASE ORAL EVERY EVENING
Qty: 30 TABLET | Refills: 1 | Status: SHIPPED | OUTPATIENT
Start: 2023-10-09

## 2023-10-09 RX ADMIN — SENNOSIDES AND DOCUSATE SODIUM 1 TABLET: 50; 8.6 TABLET ORAL at 09:04

## 2023-10-09 RX ADMIN — LEVOTHYROXINE SODIUM 50 MCG: 50 TABLET ORAL at 09:04

## 2023-10-09 RX ADMIN — NIFEDIPINE 60 MG: 30 TABLET, FILM COATED, EXTENDED RELEASE ORAL at 09:04

## 2023-10-09 RX ADMIN — ACETAMINOPHEN 975 MG: 325 TABLET, FILM COATED ORAL at 05:16

## 2023-10-09 ASSESSMENT — ACTIVITIES OF DAILY LIVING (ADL)
ADLS_ACUITY_SCORE: 35

## 2023-10-09 NOTE — DISCHARGE INSTRUCTIONS
Learning About Preeclampsia After Childbirth  What is preeclampsia?     Preeclampsia is high blood pressure and signs of organ damage, such as protein in the urine, usually after 20 weeks of pregnancy. If it's not treated, preeclampsia can harm you or your baby.  Severe preeclampsia can lead to dangerous seizures (eclampsia). When preeclampsia affects the liver, it can cause HELLP syndrome, a blood-clotting and bleeding problem. HELLP can come on quickly and can be dangerous. This is why your doctor checks you and your baby often.  Preeclampsia usually goes away after the baby is born. But symptoms may last or get worse after delivery. In rare cases, symptoms may not show up until days or even weeks after childbirth.  What are the symptoms?  Mild preeclampsia usually doesn't cause symptoms. But it may cause rapid weight gain and sudden swelling of the hands and face. Severe preeclampsia can cause symptoms such as a severe headache, vision problems, and trouble breathing. It also can cause belly pain. And you may urinate less than usual.  What can you expect after you've had preeclampsia?  In the hospital  After the baby and the placenta are delivered, preeclampsia usually starts to get better. Most people get better in the first few days after childbirth.  After having preeclampsia, you still have a risk of seizures for a day or more after childbirth. (In very rare cases, seizures happen later on.) So your doctor may have you take magnesium sulfate for a day or more to prevent seizures. You may also take medicine to lower your blood pressure.  When you go home  Your blood pressure will most likely return to normal a few days after delivery. Your doctor will want to check your blood pressure sometime in the first week after you leave the hospital.  High blood pressure sometimes continues after childbirth. But it usually returns to normal levels with time.  Take and record your blood pressure at home if your doctor  tells you to.  Ask your doctor to check your blood pressure monitor to be sure that it is accurate and that the cuff fits you. Also ask your doctor to watch you use it, to make sure that you are using it right.  Don't eat, use tobacco products, or use medicine known to raise blood pressure (such as some nasal decongestant sprays) before you take your blood pressure.  Avoid taking your blood pressure if you have just exercised or if you're nervous or upset. Rest at least 15 minutes before you take your blood pressure.  Take your medicines exactly as prescribed. Call your doctor if you think you are having a problem with your medicine.  If you smoke, quit or cut back as much as you can. Smoking and vaping can be harmful to your baby. Talk to your doctor if you need help quitting.  Eat a variety of healthy foods. Include plenty of foods high in calcium, such as dairy products, almonds, and dark leafy greens.  Long-term health  After you've had preeclampsia, you have an increased risk of high blood pressure, heart disease, stroke, and kidney disease. This may be because the same things that cause preeclampsia also cause heart and kidney disease.  To protect your health, work with your doctor on living a heart-healthy lifestyle and getting the checkups you need. Your doctor may also want you to check your blood pressure at home.  Follow-up care is a key part of your treatment and safety. Be sure to make and go to all appointments, and call your doctor if you are having problems. It's also a good idea to know your test results and keep a list of the medicines you take.  When should you call for help?  Share this information with your partner or a friend. They can help you watch for warning signs.  Call 911  anytime you think you may need emergency care. For example, call if:    You passed out (lost consciousness).     You have a seizure.     You have trouble breathing.     You have chest pain.   Call your doctor now or  "seek immediate medical care if:    You have symptoms of preeclampsia, such as:  Sudden swelling of your face, hands, or feet.  New vision problems (such as dimness, blurring, or seeing spots).  A severe headache.     Your blood pressure is very high, such as 160/110 or higher.     Your blood pressure is higher than your doctor told you it should be, or it rises quickly.     You have new nausea or vomiting.     You have pain in your belly or pelvis.     You gain weight rapidly.   Where can you learn more?  Go to https://www.[employer].net/patiented  Enter Q718 in the search box to learn more about \"Learning About Preeclampsia After Childbirth.\"  Current as of: November 9, 2022               Content Version: 13.7    2746-1616 [employer].   Care instructions adapted under license by your healthcare professional. If you have questions about a medical condition or this instruction, always ask your healthcare professional. [employer] disclaims any warranty or liability for your use of this information.      "

## 2023-10-09 NOTE — PLAN OF CARE
Goal Outcome Evaluation:   D: Vital signs stable, known elevated pressures. Reviewed pre-eclampsia warning signs and when to call. Patient has BP cuff at home as well as the card from previous discharge last week. Feels comfortable taking her pressures twice daily. Home medications reviewed, patient had no questions.   I: Pt. received complete discharge paperwork and home medications as filled by discharge pharmacy.  Pt. was given times of last dose for all discharge medications in writing on discharge medication sheets.  Discharge teaching included home medication. Reiterated pain management, activity restrictions, postpartum cares, and signs and symptoms of infection.    A: Discharge outcomes on care plan met.  Mother states understanding.  P: Pt. discharged to home.  Pt. was accompanied by ,  and baby, and left with personal belongings. Pt. to follow up with OB per MD order.  Pt. had no further questions at the time of discharge and no unmet needs were identified.      Plan of Care Reviewed With: patient    Overall Patient Progress: improvingOverall Patient Progress: improving           Problem: Plan of Care - These are the overarching goals to be used throughout the patient stay.    Goal: Plan of Care Review  Description: The Plan of Care Review/Shift note should be completed every shift.  The Outcome Evaluation is a brief statement about your assessment that the patient is improving, declining, or no change.  This information will be displayed automatically on your shift note.  Outcome: Adequate for Care Transition  Flowsheets (Taken 10/9/2023 1151)  Plan of Care Reviewed With: patient  Overall Patient Progress: improving

## 2023-10-09 NOTE — PLAN OF CARE
Readmission with severe range BPs and edema/SOB. BP's elevated this afternoon, none in severe range, other vital signs stable. Postpartum assessment WDL. Incision clean, dry, intact and open to air/with steri strips in place. Pain controlled with oral pain mediations. Patient ambulating and voiding without difficulty. Breastfeeding and supplementing independently for , who is boarding in room with pt, along with pt's spouse. Will continue with current plan of care.   Goal Outcome Evaluation:      Plan of Care Reviewed With: spouse    Overall Patient Progress: improving

## 2023-10-09 NOTE — PLAN OF CARE
"Goal Outcome Evaluation:      Plan of Care Reviewed With: patient    Overall Patient Progress: improvingOverall Patient Progress: improving    VSS and postpartum assessments WDL. Denies Preeclampsia symptoms. +2 reflexes, 1 beat clonus on R-foot, absent clonus on L-foot. +2+3 edema. Up ad norman with steady gait and independent with cares. Pain managed with Tylenol.  Spouse Richi present and supportive.  Will continue with postpartum cares and education per plan of care.     Problem: Plan of Care - These are the overarching goals to be used throughout the patient stay.    Goal: Plan of Care Review  Description: The Plan of Care Review/Shift note should be completed every shift.  The Outcome Evaluation is a brief statement about your assessment that the patient is improving, declining, or no change.  This information will be displayed automatically on your shift note.  Outcome: Progressing  Flowsheets (Taken 10/9/2023 0711)  Plan of Care Reviewed With: patient  Overall Patient Progress: improving  Goal: Patient-Specific Goal (Individualized)  Description: You can add care plan individualizations to a care plan. Examples of Individualization might be:  \"Parent requests to be called daily at 9am for status\", \"I have a hard time hearing out of my right ear\", or \"Do not touch me to wake me up as it startles me\".  Outcome: Progressing  Goal: Absence of Hospital-Acquired Illness or Injury  Outcome: Progressing  Intervention: Prevent Skin Injury  Recent Flowsheet Documentation  Taken 10/9/2023 0037 by Mady Terrazas, RN  Body Position: position changed independently  Taken 10/8/2023 2022 by Mady Terrazas, RN  Body Position: position changed independently  Goal: Optimal Comfort and Wellbeing  Outcome: Progressing  Intervention: Provide Person-Centered Care  Recent Flowsheet Documentation  Taken 10/9/2023 0037 by Mady Terrazas, RN  Trust Relationship/Rapport:   care explained   choices provided   questions " answered   questions encouraged   reassurance provided   thoughts/feelings acknowledged  Taken 10/8/2023 2022 by Mady Terrazas, RN  Trust Relationship/Rapport:   care explained   choices provided   questions answered   questions encouraged   reassurance provided   thoughts/feelings acknowledged  Goal: Readiness for Transition of Care  Outcome: Progressing     Problem: Hypertensive Disorders in Pregnancy  Goal: Maternal-Fetal Stabilization  Outcome: Progressing

## 2023-10-09 NOTE — PROVIDER NOTIFICATION
10/09/23 0952   Provider Notification   Provider Name/Title g   Method of Notification Electronic Page   Request Evaluate-Remote   Notification Reason Other  (dc order clarification)     Looking at dc meds for 7131, is she to take both the 30mg and 60 mg perscriptions home? Please confirm total daily. Any additional follow up to include in discharge teaching? Thanks. Dena

## 2023-10-09 NOTE — PROGRESS NOTES
"[facility]  Daily Rounding Progress Note    S:  Patient reports feeling overall well this morning. She says her swelling has improved and she feels \"lighter.\" Her milk came in overnight, so she she did not sleep well.  Denies headache, vision changes, chest pain, shortness of breath, RUQ pain, increased edema, lightheadedness, or dizziness. Endorses she would feel comfortable discharging home today. Voiding spontaneously and passing flatus.     O:  Temp: 98.2  F (36.8  C) Temp src: Oral BP: (!) 144/94 Pulse: 81   Resp: 16   O2 Device: None (Room air)    Last 24 hr BP:  144/94 10/9/23 0908   137/89 10/9/23 0516   131/89 10/9/23 0037   139/86 10/8/23 2022   145/88 10/8/23 1800   141/95 10/8/23 1258       Gen:  Resting comfortably, NAD  CV:  Regular rate and rhythm. No murmurs.   Pulm:  NWOB, CTAB  Abd:  Soft, non-tender, fundus palpated 2 fingerbreadths below umbilicus; incision clean/dry/intact without surrounding erythema or drainage. Steri-strips in place.   Ext:  Appear well perfused; grossly normal strength and coordination. Trace bilateral edema.     UOP:  Intake/Output Summary (Last 24 hours) at 10/9/2023 0912  Last data filed at 10/9/2023 0518  Gross per 24 hour   Intake 2400 ml   Output 5625 ml   Net -3225 ml      Results for orders placed or performed during the hospital encounter of 10/07/23 (from the past 24 hour(s))   Glucose by meter   Result Value Ref Range    GLUCOSE BY METER POCT 84 70 - 99 mg/dL   Glucose by meter   Result Value Ref Range    GLUCOSE BY METER POCT 77 70 - 99 mg/dL   Glucose by meter   Result Value Ref Range    GLUCOSE BY METER POCT 84 70 - 99 mg/dL   Glucose by meter   Result Value Ref Range    GLUCOSE BY METER POCT 130 (H) 70 - 99 mg/dL   Glucose by meter   Result Value Ref Range    GLUCOSE BY METER POCT 90 70 - 99 mg/dL        A/P:  Lauren Goodwin is a 33 year old , POD#5 s/p PLTCS for Cat II FHT,  who is HD#3 re-admitted for management of severe " range blood pressures in the setting of  pre-eclampsia with severe features. Received 24hr Mg during initial postpartum course, so did not require Mg this admission. After readmission, pt with SOB, so given 1 dose lasix with significant diuresis. Bedside Echo showed preserved EF and small pericardial effusion and CXR was negative. Also given IV labetalol 20 and IV antihypertensives prn, as well as 30mg nifedipine. Increased nifedipine dose, and now on day#2 of 60/30. HELLP labs on 10/7 wnl. In past 24 hours, BP overall well controlled with a few low-mild ranges. Patient has no symptoms, and says swelling feels improved. Given clinical stability, discharge today with outpatient follow-up seems reasonable. Patient has home blood pressure cuff and will monitor. Discussed symptoms that would warrant follow-up or return to ED. Patient is in agreement with plan.     # Preeclampsia with severe features  -Home bp monitoring BID  -Continue nifedipine 60mg/30mg  -Keep scheduled appointment on 10/13/23 for blood pressure recheck     # DM Type I  - Continue use of home insulin pump and PTA regimen  - s/p Inpatient Endocrinology consult     # Hypothyroidism  - Continue PTA levothyroxine     # Postpartum management  Pain:     Well-controlled with ibuprofen and tylenol  GI:         PRN bowel regimen, anti-emetics  :       Voiding spontaneously  Rh:        Positive  Feed:     Breast feeding  Infant:   Stable in room with patient, partner in room   PPx:      Encourage ambulation    Heather Bowers, MS3    I saw and evaluated the patient with Heather Bowers and agree with the above note. I participated in history taking, physical examination, and clinical decision making.     Nataliya Hubbard MD  Obstetrics and Gynecology, PGY-1  10/09/23 9:56 AM

## 2023-10-09 NOTE — PROGRESS NOTES
[facility] Diabetes Management  Daily Note          HPI: Lauren is a 33 year old female with a PMHx  at 36w3d with a past medical history of type 1 diabetes (noted to be within good control this pregnancy)  and hypothyroidism who was admitted on 10/2/2023 for IOL- noted concerns for pre-eclampsia. She delivered on 10/4/2023, post  section. Discharged on 10/6/2023. Readmitted 10/7/2023  with preeclampsia.     Re consult for pump on admission(pt with home insulin pump. readmitted w/ pp pre-eclampsia ).  Had just seen patient so did not repeat consult, just progress note     Assessment:   1.Type 1 diabetes now in post partum period with good control   2. Starting to breastfeed/pump, supplement with formula  3. Pre eclmapsia     Plan:               -Continue insulin pump  Tandem T-slim running in control IQ  Basal rates:  Rate #1: 0.6 units/hr 2007-9669-->Changed today from 0.7  Rate #2 : 0.75 units/hr 8058-3023  Rate #3: 0.45 units/hr 0177-7714  Rate #4: 0.4 units/hr 2141-9544-  Rate #5: 0.9 units/hr 0327-2130     Sensitivity or correction factor: 1:40-patient self decreased to 1/40  Carb ratio: Continue 1:12              -BG monitoring TID ac/ hs//0200/0500              -Please contact endocrinology if BG >240 for 4 hours- patient has type 1 diabetes and would be at risk for life threatening DKA              -hypoglycemia protocol              -carb counting protocol        Discharge Planning:               -Continue settings in insulin pump as above              -diabetes education needs: none  -Outpatient follow up: Anali Lehman MD- [facility] [place].  She has [facility] [place] phone numbers in case she cannot get a hold of Dr Mera and is having low BG <80 or BG >250  -Test claim: none needed at this time     Plan discussed with patient in person, bedside RN and ,   primary team via this note  Interval History and Assessment: interval glucose trend reviewed:   - BG remain in tight control --77 overnight she  says associated to breast feeding/pumping so made basal adjust ment as above . patient appreciates tight control. Targets set on pump 100-130--  Asked her to call us or Dr Mera if continues to have more BG <80.  She is bolusing but does not always let us know  should next change IFS back to 1:50  Aware breast feeding laurie could cause lower BG  She has fruit roll ups to treat lows.  No headache.  No n,v,d--> she is eating  Patient notes she has extra supplies if needed- she is very savvy with her pump.       She denies nausea, vomiting or diarrhea. B       Labs:10/7 last chemistries done  Creatinine: 0.62  eGFR:>90  Hgb=8.9  C02=25 and AG=10     Current nutritional intake and type: Orders Placed This Encounter      Advance Diet as Tolerated: Regular Diet Adult      Diet        Planned Procedures/surgeries:   Steroid planning: none  D5W-containing solutions/medications: order for d5% but not currently running     PTA Diabetes Regimen:   Setting on discharge on 10/6/2023    Tandem T-slim running in control IQ  Basal rates:  Rate #1: 0.7 units/hr 0020-7274  Rate #2 : 0.75 units/hr 6367-7689  Rate #3: 0.45 units/hr 0392-7878  Rate #4: 0.4 units/hr 0648-0809-  Rate #5: 0.9 units/hr 6714-0482     Sensitivity or correction factor: 1:50-  Carb ratio: Continue 1:12     Tandem Insulin pump- control IQ while pregnant  Basal Rates and Start/End times:   Rate #1 = 1.25 units/hr from 0000 to 1100   Rate #2 = 0.75 units/hr from 1100 to 1500.   Rate #3 = 0.25 units/hr from 1500 to 1930   Rate #4 = 1.5 units/hr from 1930 to 0000.   Bolus settings:  ISF 25  CHO 1:6.5        Prepregnancy:  Basal rates:  Rate #1: 0.7 units/hr 0444-2505  Rate #2 : 1.35 units/hr 9476-0910  Rate #3: 0.85 units/hr 9970-7164  Rate #4: 0.6 units/hr 7181-1734  Rate #5: 1.4 units/hr 1930-000     ISF 35  CHO 1:10            Diabetes History:   Type of Diabetes: Type 1 Diabetes Mellitus            Lab Results   Component Value Date     A1C 5.1  "10/03/2023     A1C 7.6 03/14/2019     A1C 7.8 11/13/2018     A1C 7.3 07/10/2018     A1C 8.4 03/01/2018               Review of Systems:      The Review of Systems is negative other than noted in the Interval History.           Medications:    see MAR        Physical Exam:    Blood pressure (!) 144/94, pulse 81, temperature 98.2  F (36.8  C), temperature source Oral, resp. rate 16, height 1.626 m (5' 4\"), weight 81.3 kg (179 lb 4.8 oz), last menstrual period 01/18/2023, SpO2 98 %, currently breastfeeding.       General: pleasant, in no distress. Walking around room.  is in room  HEENT: normocephalic, atraumatic. Oral mucous membranes moist.   Lungs: unlabored respiration, no cough  ABD: rounded  Skin: warm and dry, no obvious lesions- very limited assessment  MSK:  moves all extremities  Mental status:  alert, oriented to self, place, time  Psych:  bright affect, calm and appropriate interaction   Can see pump on belt             Data:                      Recent Labs   Lab 10/06/23  0455 10/05/23  2209 10/05/23  1755 10/05/23  1344 10/05/23  0845 10/05/23  0502   * 119* 146* 100* 112* 93       ROUTINE IP LABS (Last four results)  BMP  Recent Labs   Lab 10/09/23  0940 10/09/23  0523 10/09/23  0256 10/09/23  0034 10/07/23  2057 10/07/23  1530 10/05/23  0845 10/05/23  0756 10/04/23  2217 10/04/23  2136 10/04/23  1112 10/04/23  1100 10/03/23  0213 10/02/23  2211   NA  --   --   --   --   --  137  --   --   --   --   --  132*  --  137   POTASSIUM  --   --   --   --   --  3.9  --   --   --   --   --  4.0  --  4.0   CHLORIDE  --   --   --   --   --  102  --   --   --   --   --  101  --  106   ROSE  --   --   --   --   --  8.9  --   --   --   --   --  7.2*  --  9.0   CO2  --   --   --   --   --  25  --   --   --   --   --  16*  --  21*   BUN  --   --   --   --   --  9.9  --   --   --   --   --  18.9  --  16.3   CR  --   --   --   --   --  0.62  --  0.99*  --  1.17*  --  1.16*  1.16*   < > 0.82   GLC 90 130* 84 " 77   < > 138*   < >  --    < >  --    < > 170*   < > 104*    < > = values in this interval not displayed.     CBC  Recent Labs   Lab 10/07/23  1530 10/05/23  0756 10/04/23  2136 10/04/23  0740 10/03/23  2324 10/03/23  0734 10/02/23  2211   WBC 8.8  --   --   --  14.8*  --  10.1   RBC 3.29*  --   --   --  4.37  --  4.03   HGB 9.7* 8.9* 8.9* 11.3* 13.0   < > 11.9   HCT 29.5*  --   --   --  39.3  --  34.6*   MCV 90  --   --   --  90  --  86   MCH 29.5  --   --   --  29.7  --  29.5   MCHC 32.9  --   --   --  33.1  --  34.4   RDW 13.4  --   --   --  13.2  --  13.2     --  171 168 177   < > 190    < > = values in this interval not displayed.     INR  Recent Labs   Lab 10/07/23  1530   INR 0.85             I spent a total of 35 minutes face to face or coordinating care of Lauren Goodwin.                  Over 50% of my time on the unit was spent counseling the patient and/or coordinating care regarding acute hyperglycemia management.  See note for details.        Contacting the Inpatient Diabetes Team   From 7AM-5PM: page inpatient diabetes provider that is following the patient, or utilize the job code paging system. From 5PM-7AM: page the job code for endocrine fellow on call.      Please use the following job code to reach the Inpatient Diabetes team. Note that you must use an in house phone and that job codes cannot receive text pages.   Dial [phone] (or star-star-star [phone] on the new IPX telephones), then [phone] to reach the endocrine-diabetes provider on call.     Nicolette Bell PA-C  Inpatient Diabetes Service  Pager   [phone]  Date of Service: 10/9/2023

## 2023-10-26 LAB
PATH REPORT.COMMENTS IMP SPEC: NORMAL
PATH REPORT.COMMENTS IMP SPEC: NORMAL
PATH REPORT.FINAL DX SPEC: NORMAL
PATH REPORT.GROSS SPEC: NORMAL
PATH REPORT.MICROSCOPIC SPEC OTHER STN: NORMAL
PATH REPORT.RELEVANT HX SPEC: NORMAL
PHOTO IMAGE: NORMAL

## 2023-10-26 NOTE — ADDENDUM NOTE
Addendum  created 10/26/23 1333 by Tania Morris MD    Clinical Note Signed, Intraprocedure Blocks edited, Intraprocedure Event edited, Pend clinical note, SmartForm saved

## 2024-04-17 ENCOUNTER — MEDICAL CORRESPONDENCE (OUTPATIENT)
Dept: HEALTH INFORMATION MANAGEMENT | Facility: CLINIC | Age: 34
End: 2024-04-17
Payer: COMMERCIAL

## 2024-05-12 ENCOUNTER — HEALTH MAINTENANCE LETTER (OUTPATIENT)
Age: 34
End: 2024-05-12

## 2024-06-11 DIAGNOSIS — Z84.89 FAMILY HISTORY OF GENETIC DISORDER: Primary | ICD-10-CM

## 2024-06-11 NOTE — PROGRESS NOTES
Lauren's son was recently diagnosed with 22q11.2 deletion syndrome. We discussed that this condition can be new in a child or inherited from a parent. Due to the variability in this syndrome, the absence of parental features does not exclude a diagnosis. For the 2.5-3Mb deletion, 10% are inherited form parents. For the nested deletion (like Lauren's son), 60% are inherited from a parents.     We discussed the details, limitations, and possible outcomes of a targeted array for 22q11.2 deletion syndrome.  There are two types of results:  Negative: meaning the 22q11.2 deletion was not detected  Positive: meaning the 22q11.2 deletion was detected  We discussed that a positive result could provide an etiology to Lauren's symptoms, give anticipatory guidance as to their potential progression, and clarify risks to family members.  We also discussed that a positive result could indicate that Lauren is at risks for other health concerns, outside of their presenting symptoms    We discussed the potential benefits of genetic testing and why this genetic testing is medically indicated. A positive result will help clarify family history noted in Lauren and could guide medical management for Lauren.  If a genetic cause is found for Lauren, it will give us a more accurate risk assessment for other family members. Thus, the recommended testing for Lauren is DIAGNOSTIC testing, and it is NOT investigational.    Plan:  1. Lauren expressed verbal informed consent to proceed with genetic testing (targeted array for 22q11.2 deletion) via self-payment. I will mail a buccal collection kit to their home address. Lauren will follow the included instructions and mail back to the laboratory.   2. The results of genetic testing are available ~2-4 weeks after the sample is received by the laboratory.  I will call Lauren with the results when they are available. Results will not be left via voicemail, regardless of  outcome.  3. Contact information provided.    Jennifer Leonardo MS [facility]  Genetic Counselor  Email: [email or web address]  Phone: [phone]  Pager: [phone]

## 2024-09-29 ENCOUNTER — HEALTH MAINTENANCE LETTER (OUTPATIENT)
Age: 34
End: 2024-09-29

## 2024-12-08 ENCOUNTER — HEALTH MAINTENANCE LETTER (OUTPATIENT)
Age: 34
End: 2024-12-08

## 2025-01-17 ENCOUNTER — OFFICE VISIT (OUTPATIENT)
Dept: FAMILY MEDICINE | Facility: CLINIC | Age: 35
End: 2025-01-17
Payer: COMMERCIAL

## 2025-01-17 VITALS
BODY MASS INDEX: 21.82 KG/M2 | HEIGHT: 67 IN | DIASTOLIC BLOOD PRESSURE: 79 MMHG | WEIGHT: 139 LBS | SYSTOLIC BLOOD PRESSURE: 113 MMHG | OXYGEN SATURATION: 100 % | TEMPERATURE: 98 F | HEART RATE: 85 BPM | RESPIRATION RATE: 16 BRPM

## 2025-01-17 DIAGNOSIS — E06.3 HASHIMOTO'S THYROIDITIS: ICD-10-CM

## 2025-01-17 DIAGNOSIS — Z87.59 HISTORY OF PRE-ECLAMPSIA: ICD-10-CM

## 2025-01-17 DIAGNOSIS — E10.9 TYPE 1 DIABETES MELLITUS WITHOUT COMPLICATION (H): ICD-10-CM

## 2025-01-17 DIAGNOSIS — Z00.00 ANNUAL PHYSICAL EXAM: Primary | ICD-10-CM

## 2025-01-17 PROBLEM — O36.60X0 EXCESSIVE FETAL GROWTH AFFECTING MANAGEMENT OF PREGNANCY, ANTEPARTUM, SINGLE OR UNSPECIFIED FETUS: Status: RESOLVED | Noted: 2023-07-10 | Resolved: 2025-01-17

## 2025-01-17 PROBLEM — Z96.41 INSULIN PUMP STATUS: Status: ACTIVE | Noted: 2021-11-08

## 2025-01-17 PROBLEM — O35.EXX0 FETAL RENAL ANOMALY, SINGLE GESTATION: Status: RESOLVED | Noted: 2023-06-12 | Resolved: 2025-01-17

## 2025-01-17 PROBLEM — O14.10 PREECLAMPSIA, SEVERE: Status: RESOLVED | Noted: 2023-10-05 | Resolved: 2025-01-17

## 2025-01-17 PROBLEM — L70.0 ACNE VULGARIS: Status: ACTIVE | Noted: 2022-12-06

## 2025-01-17 PROBLEM — F41.9 ANXIETY: Status: ACTIVE | Noted: 2022-12-06

## 2025-01-17 PROBLEM — N97.9 FEMALE FERTILITY PROBLEM: Status: RESOLVED | Noted: 2022-12-06 | Resolved: 2025-01-17

## 2025-01-17 PROBLEM — Z01.818 PRE-OP EXAM: Status: RESOLVED | Noted: 2018-11-19 | Resolved: 2025-01-17

## 2025-01-17 PROBLEM — H52.203 MYOPIA OF BOTH EYES WITH ASTIGMATISM: Status: ACTIVE | Noted: 2021-10-18

## 2025-01-17 PROBLEM — O40.3XX0 POLYHYDRAMNIOS IN SINGLETON PREGNANCY IN THIRD TRIMESTER: Status: RESOLVED | Noted: 2023-08-08 | Resolved: 2025-01-17

## 2025-01-17 PROBLEM — H52.13 MYOPIA OF BOTH EYES WITH ASTIGMATISM: Status: ACTIVE | Noted: 2021-10-18

## 2025-01-17 LAB
ANION GAP SERPL CALCULATED.3IONS-SCNC: 10 MMOL/L (ref 7–15)
BUN SERPL-MCNC: 16.8 MG/DL (ref 6–20)
CALCIUM SERPL-MCNC: 9.9 MG/DL (ref 8.8–10.4)
CHLORIDE SERPL-SCNC: 102 MMOL/L (ref 98–107)
CHOLEST SERPL-MCNC: 173 MG/DL
CREAT SERPL-MCNC: 0.7 MG/DL (ref 0.51–0.95)
CREAT UR-MCNC: 207 MG/DL
EGFRCR SERPLBLD CKD-EPI 2021: >90 ML/MIN/1.73M2
EST. AVERAGE GLUCOSE BLD GHB EST-MCNC: 117 MG/DL
FASTING STATUS PATIENT QL REPORTED: YES
FASTING STATUS PATIENT QL REPORTED: YES
GLUCOSE SERPL-MCNC: 110 MG/DL (ref 70–99)
HBA1C MFR BLD: 5.7 % (ref 0–5.6)
HCO3 SERPL-SCNC: 25 MMOL/L (ref 22–29)
HDLC SERPL-MCNC: 72 MG/DL
LDLC SERPL CALC-MCNC: 90 MG/DL
MICROALBUMIN UR-MCNC: 18.5 MG/L
MICROALBUMIN/CREAT UR: 8.94 MG/G CR (ref 0–25)
NONHDLC SERPL-MCNC: 101 MG/DL
POTASSIUM SERPL-SCNC: 4 MMOL/L (ref 3.4–5.3)
SODIUM SERPL-SCNC: 137 MMOL/L (ref 135–145)
TRIGL SERPL-MCNC: 57 MG/DL
TSH SERPL DL<=0.005 MIU/L-ACNC: 1.94 UIU/ML (ref 0.3–4.2)

## 2025-01-17 PROCEDURE — 82043 UR ALBUMIN QUANTITATIVE: CPT | Performed by: PHYSICIAN ASSISTANT

## 2025-01-17 PROCEDURE — 99207 PR FOOT EXAM NO CHARGE: CPT | Performed by: PHYSICIAN ASSISTANT

## 2025-01-17 PROCEDURE — 90673 RIV3 VACCINE NO PRESERV IM: CPT | Performed by: PHYSICIAN ASSISTANT

## 2025-01-17 PROCEDURE — 84443 ASSAY THYROID STIM HORMONE: CPT | Performed by: PHYSICIAN ASSISTANT

## 2025-01-17 PROCEDURE — 80048 BASIC METABOLIC PNL TOTAL CA: CPT | Performed by: PHYSICIAN ASSISTANT

## 2025-01-17 PROCEDURE — 99385 PREV VISIT NEW AGE 18-39: CPT | Mod: 25 | Performed by: PHYSICIAN ASSISTANT

## 2025-01-17 PROCEDURE — 99214 OFFICE O/P EST MOD 30 MIN: CPT | Mod: 25 | Performed by: PHYSICIAN ASSISTANT

## 2025-01-17 PROCEDURE — 83036 HEMOGLOBIN GLYCOSYLATED A1C: CPT | Performed by: PHYSICIAN ASSISTANT

## 2025-01-17 PROCEDURE — 80061 LIPID PANEL: CPT | Performed by: PHYSICIAN ASSISTANT

## 2025-01-17 PROCEDURE — 82570 ASSAY OF URINE CREATININE: CPT | Performed by: PHYSICIAN ASSISTANT

## 2025-01-17 PROCEDURE — 36415 COLL VENOUS BLD VENIPUNCTURE: CPT | Performed by: PHYSICIAN ASSISTANT

## 2025-01-17 PROCEDURE — 90480 ADMN SARSCOV2 VAC 1/ONLY CMP: CPT | Performed by: PHYSICIAN ASSISTANT

## 2025-01-17 PROCEDURE — 91320 SARSCV2 VAC 30MCG TRS-SUC IM: CPT | Performed by: PHYSICIAN ASSISTANT

## 2025-01-17 PROCEDURE — 90471 IMMUNIZATION ADMIN: CPT | Performed by: PHYSICIAN ASSISTANT

## 2025-01-17 RX ORDER — PROCHLORPERAZINE 25 MG/1
SUPPOSITORY RECTAL
COMMUNITY
Start: 2025-01-08

## 2025-01-17 RX ORDER — PROCHLORPERAZINE 25 MG/1
SUPPOSITORY RECTAL
COMMUNITY
Start: 2024-10-24

## 2025-01-17 RX ORDER — IBUPROFEN 600 MG/1
1 TABLET ORAL
COMMUNITY

## 2025-01-17 SDOH — HEALTH STABILITY: PHYSICAL HEALTH: ON AVERAGE, HOW MANY DAYS PER WEEK DO YOU ENGAGE IN MODERATE TO STRENUOUS EXERCISE (LIKE A BRISK WALK)?: 4 DAYS

## 2025-01-17 ASSESSMENT — SOCIAL DETERMINANTS OF HEALTH (SDOH): HOW OFTEN DO YOU GET TOGETHER WITH FRIENDS OR RELATIVES?: TWICE A WEEK

## 2025-01-17 NOTE — PATIENT INSTRUCTIONS
Patient Education   Preventive Care Advice   This is general advice given by our system to help you stay healthy. However, your care team may have specific advice just for you. Please talk to your care team about your preventive care needs.  Nutrition  Eat 5 or more servings of fruits and vegetables each day.  Try wheat bread, brown rice and whole grain pasta (instead of white bread, rice, and pasta).  Get enough calcium and vitamin D. Check the label on foods and aim for 100% of the RDA (recommended daily allowance).  Lifestyle  Exercise at least 150 minutes each week  (30 minutes a day, 5 days a week).  Do muscle strengthening activities 2 days a week. These help control your weight and prevent disease.  No smoking.  Wear sunscreen to prevent skin cancer.  Have a dental exam and cleaning every 6 months.  Yearly exams  See your health care team every year to talk about:  Any changes in your health.  Any medicines your care team has prescribed.  Preventive care, family planning, and ways to prevent chronic diseases.  Shots (vaccines)   HPV shots (up to age 26), if you've never had them before.  Hepatitis B shots (up to age 59), if you've never had them before.  COVID-19 shot: Get this shot when it's due.  Flu shot: Get a flu shot every year.  Tetanus shot: Get a tetanus shot every 10 years.  Pneumococcal, hepatitis A, and RSV shots: Ask your care team if you need these based on your risk.  Shingles shot (for age 50 and up)  General health tests  Diabetes screening:  Starting at age 35, Get screened for diabetes at least every 3 years.  If you are younger than age 35, ask your care team if you should be screened for diabetes.  Cholesterol test: At age 39, start having a cholesterol test every 5 years, or more often if advised.  Bone density scan (DEXA): At age 50, ask your care team if you should have this scan for osteoporosis (brittle bones).  Hepatitis C: Get tested at least once in your life.  STIs (sexually  transmitted infections)  Before age 24: Ask your care team if you should be screened for STIs.  After age 24: Get screened for STIs if you're at risk. You are at risk for STIs (including HIV) if:  You are sexually active with more than one person.  You don't use condoms every time.  You or a partner was diagnosed with a sexually transmitted infection.  If you are at risk for HIV, ask about PrEP medicine to prevent HIV.  Get tested for HIV at least once in your life, whether you are at risk for HIV or not.  Cancer screening tests  Cervical cancer screening: If you have a cervix, begin getting regular cervical cancer screening tests starting at age 21.  Breast cancer scan (mammogram): If you've ever had breasts, begin having regular mammograms starting at age 40. This is a scan to check for breast cancer.  Colon cancer screening: It is important to start screening for colon cancer at age 45.  Have a colonoscopy test every 10 years (or more often if you're at risk) Or, ask your provider about stool tests like a FIT test every year or Cologuard test every 3 years.  To learn more about your testing options, visit:   .  For help making a decision, visit:   https://bit.ly/jf83805.  Prostate cancer screening test: If you have a prostate, ask your care team if a prostate cancer screening test (PSA) at age 55 is right for you.  Lung cancer screening: If you are a current or former smoker ages 50 to 80, ask your care team if ongoing lung cancer screenings are right for you.  For informational purposes only. Not to replace the advice of your health care provider. Copyright   2023 [facility]. All rights reserved. Clinically reviewed by the [facility]. [employer] [id number] - REV 01/24.  Learning About Stress  What is stress?     Stress is your body's response to a hard situation. Your body can have a physical, emotional, or mental response. Stress is a fact of life for most people, and it  affects everyone differently. What causes stress for you may not be stressful for someone else.  A lot of things can cause stress. You may feel stress when you go on a job interview, take a test, or run a race. This kind of short-term stress is normal and even useful. It can help you if you need to work hard or react quickly. For example, stress can help you finish an important job on time.  Long-term stress is caused by ongoing stressful situations or events. Examples of long-term stress include long-term health problems, ongoing problems at work, or conflicts in your family. Long-term stress can harm your health.  How does stress affect your health?  When you are stressed, your body responds as though you are in danger. It makes hormones that speed up your heart, make you breathe faster, and give you a burst of energy. This is called the fight-or-flight stress response. If the stress is over quickly, your body goes back to normal and no harm is done.  But if stress happens too often or lasts too long, it can have bad effects. Long-term stress can make you more likely to get sick, and it can make symptoms of some diseases worse. If you tense up when you are stressed, you may develop neck, shoulder, or low back pain. Stress is linked to high blood pressure and heart disease.  Stress also harms your emotional health. It can make you mtz, tense, or depressed. Your relationships may suffer, and you may not do well at work or school.  What can you do to manage stress?  You can try these things to help manage stress:   Do something active. Exercise or activity can help reduce stress. Walking is a great way to get started. Even everyday activities such as housecleaning or yard work can help.  Try yoga or imtiaz chi. These techniques combine exercise and meditation. You may need some training at first to learn them.  Do something you enjoy. For example, listen to music or go to a movie. Practice your hobby or do volunteer  "work.  Meditate. This can help you relax, because you are not worrying about what happened before or what may happen in the future.  Do guided imagery. Imagine yourself in any setting that helps you feel calm. You can use online videos, books, or a teacher to guide you.  Do breathing exercises. For example:  From a standing position, bend forward from the waist with your knees slightly bent. Let your arms dangle close to the floor.  Breathe in slowly and deeply as you return to a standing position. Roll up slowly and lift your head last.  Hold your breath for just a few seconds in the standing position.  Breathe out slowly and bend forward from the waist.  Let your feelings out. Talk, laugh, cry, and express anger when you need to. Talking with supportive friends or family, a counselor, or a kyaw leader about your feelings is a healthy way to relieve stress. Avoid discussing your feelings with people who make you feel worse.  Write. It may help to write about things that are bothering you. This helps you find out how much stress you feel and what is causing it. When you know this, you can find better ways to cope.  What can you do to prevent stress?  You might try some of these things to help prevent stress:  Manage your time. This helps you find time to do the things you want and need to do.  Get enough sleep. Your body recovers from the stresses of the day while you are sleeping.  Get support. Your family, friends, and community can make a difference in how you experience stress.  Limit your news feed. Avoid or limit time on social media or news that may make you feel stressed.  Do something active. Exercise or activity can help reduce stress. Walking is a great way to get started.  Where can you learn more?  Go to https://www.[employer].net/patiented  Enter N032 in the search box to learn more about \"Learning About Stress.\"  Current as of: October 24, 2023  Content Version: 14.3    2024 [employer]. "   Care instructions adapted under license by your healthcare professional. If you have questions about a medical condition or this instruction, always ask your healthcare professional. [email or web address] disclaims any warranty or liability for your use of this information.

## 2025-01-17 NOTE — PROGRESS NOTES
EVANGELIST Goodwin is a 34 year old female who presents for an annual exam.    Other concerns today:  Generally doing well.  Was being [place] at [facility], now wants to transfer to [facility].  Has type 1 diabetes and insulin pump - [employer].  Delivered a baby in 2023.  Saw Endo while she was pregnant.      Patient Active Problem List    Diagnosis Date Noted    History of severe pre-eclampsia 10/03/2023     Priority: Medium    Acne vulgaris 2022     Priority: Medium    Anxiety 2022     Priority: Medium    Insulin pump status 2021     Priority: Medium    Myopia of both eyes with astigmatism 10/18/2021     Priority: Medium    Hashimoto's thyroiditis 2020     Priority: Medium    Type 1 diabetes mellitus without complication (H) 2018     Priority: Medium    Pilonidal cyst      Priority: Medium        Immunization History   Administered Date(s) Administered    COVID-19 12+ (Pfizer) 2024, 2025    COVID-19 Bivalent 12+ (Pfizer) 2022    COVID-19 MONOVALENT 12+ (Pfizer) 2021, 2021, 2021    DTAP (<7y) 1991, 1992, 1992, 1993, 1996    [id number] Novel Flu P-free 2009    HPV Quadrivalent 2009, 04/10/2009, 2009    HepB, Unspecified 2002, 2003, 10/17/2003    Historic Hib Hib-titer 1991, 1992, 1992, 1993    Influenza Vaccine >6 months,quad, PF 2021, 2022, 2023    Influenza Vaccine Trivalent (FluBlok) 2025    MMR 1993, 1996    Meningococcal (Menomune ) 2005    Pneumococcal 23 valent 2016    Poliovirus, inactivated (IPV) 1991, 1992, 1992, 1993    TD,PF 7+ (Tenivac) 2022    TDAP (Adacel,Boostrix) 2005, 2016, 2023    Varicella 1998, 2008       Patient's last menstrual period was 2025.  OB History    Para Term  AB Living   1 1 0 1 0 1   SAB IAB  Ectopic Multiple Live Births   0 0 0 0 1      # Outcome Date GA Lbr Isacc/2nd Weight Sex Type Anes PTL Lv   1  10/04/23 36w4d  3.459 kg (7 lb 10 oz) M CS-LTranv EPI N MIKEL      Complications: Failure to Progress in First Stage, Dysfunctional Labor, Preeclampsia/Hypertension, Fetal Intolerance      Name: NICOLE CANADA-JONATHAN      Apgar1: 7  Apgar5: 8       Current Outpatient Medications   Medication Sig Dispense Refill    Continuous Glucose Sensor (DEXCOM G6 SENSOR) MISC       Continuous Glucose Transmitter (DEXCOM G6 TRANSMITTER) MISC CHANGE EVERY 90 DAYS      insulin lispro (HUMALOG VIAL) 100 UNIT/ML vial Use up to 50 units daily in insulin pump      levothyroxine (SYNTHROID/LEVOTHROID) 50 MCG tablet Take 50 mcg by mouth daily      Glucagon, rDNA, (GLUCAGON EMERGENCY) 1 MG KIT 1 mg.       No current facility-administered medications for this visit.       Past Medical History:   Diagnosis Date    Excessive fetal growth affecting management of pregnancy, antepartum, single or unspecified fetus 07/10/2023    Fetal renal anomaly, single gestation 2023    Polyhydramnios in gómez pregnancy in third trimester 2023    Pre-eclampsia in postpartum period 10/07/2023       Past Surgical History:   Procedure Laterality Date     SECTION N/A 10/4/2023    Procedure:  section;  Surgeon: Lula Mendez MD;  Location:  [facility]    MOUTH SURGERY      NO PAST SURGERIES      PILONIDAL CYST / SINUS EXCISION N/A 2018    Procedure: PILONIDAL TRACTOTECTOMY;  Surgeon: Adriel Banuelos MD;  Location: [facility];  Service: General    WISDOM TOOTH EXTRACTION          Family History   Problem Relation Age of Onset    Thyroid Disease Mother     Crohn's Disease Sister     No Known Problems Brother     Cerebrovascular Disease Maternal Grandfather     No Known Problems Father     No Known Problems Sister           Health Care Directive  Patient does not have a Health Care Directive: Discussed  advance care planning with patient; however, patient declined at this time.      1/17/2025   General Health   How would you rate your overall physical health? Good   Feel stress (tense, anxious, or unable to sleep) To some extent   (!) STRESS CONCERN      1/17/2025   Nutrition   Three or more servings of calcium each day? Yes   Diet: Regular (no restrictions)   How many servings of fruit and vegetables per day? (!) 2-3   How many sweetened beverages each day? 0-1         1/17/2025   Exercise   Days per week of moderate/strenous exercise 4 days         1/17/2025   Social Factors   Frequency of gathering with friends or relatives Twice a week   Worry food won't last until get money to buy more No   Food not last or not have enough money for food? No   Do you have housing? (Housing is defined as stable permanent housing and does not include staying ouside in a car, in a tent, in an abandoned building, in an overnight shelter, or couch-surfing.) Yes   Are you worried about losing your housing? No   Lack of transportation? No   Unable to get utilities (heat,electricity)? No         1/17/2025   Dental   Dentist two times every year? (!) NO         1/17/2025   TB Screening   Were you born outside of the [place]? No         Today's PHQ-2 Score:       1/17/2025     8:44 AM   PHQ-2 ( 1999 Pfizer)   Q1: Little interest or pleasure in doing things 0   Q2: Feeling down, depressed or hopeless 0   PHQ-2 Score 0    Q1: Little interest or pleasure in doing things Not at all   Q2: Feeling down, depressed or hopeless Not at all   PHQ-2 Score 0       Patient-reported           1/17/2025   Substance Use   Alcohol more than 3/day or more than 7/wk No   Do you use any other substances recreationally? No     Social History     Tobacco Use    Smoking status: Never    Smokeless tobacco: Never   Vaping Use    Vaping status: Never Used   Substance Use Topics    Alcohol use: Yes     Comment: Alcoholic Drinks/day: occasionally    Drug use: No  "          1/17/2025   STI Screening   New sexual partner(s) since last STI/HIV test? No           3/1/2018    10:52 AM   PAP / HPV   PAP Negative for squamous intraepithelial lesion or malignancy  Electronically signed by Shruthi Abad CT (ASCP) on 3/5/2018 at  2:17 PM              1/17/2025   Contraception/Family Planning   Questions about contraception or family planning (!) YES         Reviewed and updated as needed this visit by Provider                  OBJECTIVE    Vitals:    01/17/25 0841   BP: 113/79   BP Location: Left arm   Patient Position: Sitting   Cuff Size: Adult Regular   Pulse: 85   Resp: 16   Temp: 98  F (36.7  C)   TempSrc: Oral   SpO2: 100%   Weight: 63 kg (139 lb)   Height: 1.695 m (5' 6.73\")       Body mass index is 21.95 kg/m .    Physical Exam:  General: patient is alert and oriented x 3, in no apparent distress  HEENT, Thyroid, Lymphatic, Cardiac, Pulmonary, GI, Musculoskeletal, Skin, and Neuro exams were completed today and grossly normal  Breast exam: Deferred  Genitourinary: Deferred  Diabetic Foot Exam:  Normal Exam.  Normal pedal pulses bilaterally.  Skin appears healthy and without significant injury.  Sensation is intact to monofilament bilaterally.        Today's Labs pending.      ASSESSMENT and PLAN  1. Annual physical exam (Primary)  Health maintenance is discussed with patient as appropriate for age and risk factors.  Flu and COVID vaccines given today.  She declines offer of birth control.  She declines STD screening.  Screening labs ordered and I will follow-up with results.  She is up-to-date with other healthcare maintenance.  - Lipid Profile    2. Type 1 diabetes mellitus without complication (H)  Chronic, well-controlled.  Has insulin pump.  Has continuous glucose monitor.  Referral will be made to Endo for ongoing management.  A1c: 5.7%  Eye Exam: Has appointment in February  Foot Exam: Done today  Smoking: No  On a statin: No, due to age and planning " pregnancy  Blood Pressure: Well-controlled  Microalbumin: Ordered today  On ACE inhibitor: No, due to age and planning pregnancy  - Hemoglobin A1c  - Basic metabolic panel  - Lipid Profile  - Albumin Random Urine Quantitative with Creat Ratio  - Adult Endocrinology  Referral; Future  - FOOT EXAM  - Ob/Gyn  Referral; Future    3. Hashimoto's thyroiditis  Chronic, stable.  Taking levothyroxine daily.  Screening labs ordered and I will follow-up with results.  - TSH with free T4 reflex    4. History of pre-eclampsia  Chronic history, Stable.  Patient is doing well currently.  She is interested in having another pregnancy in the future and would like to be prepared.  Referral made to OB/GYN to review her type 1 diabetes and history of preeclampsia, in preparation for future pregnancy.  - Ob/Gyn  Referral; Future        This dictation uses voice recognition software, which may contain typographical errors.

## 2025-02-13 ENCOUNTER — OFFICE VISIT (OUTPATIENT)
Dept: OPHTHALMOLOGY | Facility: CLINIC | Age: 35
End: 2025-02-13
Payer: COMMERCIAL

## 2025-02-13 DIAGNOSIS — H52.229 MYOPIA WITH REGULAR ASTIGMATISM: ICD-10-CM

## 2025-02-13 DIAGNOSIS — H52.10 MYOPIA WITH REGULAR ASTIGMATISM: ICD-10-CM

## 2025-02-13 DIAGNOSIS — E10.9 TYPE 1 DIABETES MELLITUS WITHOUT COMPLICATION (H): Primary | ICD-10-CM

## 2025-02-13 ASSESSMENT — CONF VISUAL FIELD
OS_SUPERIOR_NASAL_RESTRICTION: 0
METHOD: COUNTING FINGERS
OD_INFERIOR_NASAL_RESTRICTION: 0
OS_SUPERIOR_TEMPORAL_RESTRICTION: 0
OD_SUPERIOR_TEMPORAL_RESTRICTION: 0
OD_INFERIOR_TEMPORAL_RESTRICTION: 0
OS_INFERIOR_TEMPORAL_RESTRICTION: 0
OS_INFERIOR_NASAL_RESTRICTION: 0
OS_NORMAL: 1
OD_NORMAL: 1
OD_SUPERIOR_NASAL_RESTRICTION: 0

## 2025-02-13 ASSESSMENT — TONOMETRY
IOP_METHOD: ICARE
OS_IOP_MMHG: 16
OD_IOP_MMHG: 20

## 2025-02-13 ASSESSMENT — VISUAL ACUITY
OS_CC: 20/25
OD_CC: 20/20
CORRECTION_TYPE: GLASSES
METHOD: SNELLEN - LINEAR
OS_CC+: -1

## 2025-02-13 ASSESSMENT — REFRACTION_WEARINGRX
OD_AXIS: 102
OS_CYLINDER: +3.75
OS_SPHERE: -7.25
OD_SPHERE: -6.50
SPECS_TYPE: SVL
OS_AXIS: 102
OD_CYLINDER: +3.25

## 2025-02-13 ASSESSMENT — REFRACTION_MANIFEST
OS_SPHERE: -7.25
OS_CYLINDER: +3.50
OD_AXIS: 100
OS_AXIS: 097
OD_CYLINDER: +3.25
OD_SPHERE: -6.50

## 2025-02-13 ASSESSMENT — SLIT LAMP EXAM - LIDS
COMMENTS: NORMAL
COMMENTS: NORMAL

## 2025-02-13 ASSESSMENT — CUP TO DISC RATIO
OD_RATIO: 0.20
OS_RATIO: 0.20

## 2025-02-13 NOTE — PROGRESS NOTES
A/P  1.) Type 1 DM without ophthalmic manifestation OU  -Last A1c 5.7, excellent control  -Reviewed effects of DM on the eyes and importance of good blood sugar control  -Monitor annually with dilation    2.) Myopia/Astigmatism OU  -Doing well with current Rx, okay to continue    Monitor 1-2 years comprehensive, sooner prn    I have confirmed the patient's CC, HPI and reviewed Past Medical History, Past Surgical History, Social History, Family History, Problem List, Medication List and agree with Tech note.     Camryn Hurst, [facility]

## 2025-02-13 NOTE — NURSING NOTE
Chief Complaints and History of Present Illnesses   Patient presents with    Diabetic Eye Exam     Chief Complaint(s) and History of Present Illness(es)       Diabetic Eye Exam              Associated symptoms: floaters.  Negative for blurred vision and flashes    Diabetes Type: Type 1    Blood Sugars: is controlled    Treatments tried: no treatments    Pain scale: 0/10              Comments    Patient notes she is here for yearly check, PG working well.  No additional concerns today.     Lab Results       Component                Value               Date                       A1C                      5.7                 01/17/2025                 A1C                      5.1                 10/03/2023                 A1C                      7.6                 03/14/2019                 A1C                      7.8                 11/13/2018                 A1C                      7.3                 07/10/2018              Irina Roberts COT February 13, 2025 8:02 AM

## 2025-07-20 ENCOUNTER — HEALTH MAINTENANCE LETTER (OUTPATIENT)
Age: 35
End: 2025-07-20

## (undated) DEVICE — SOL NACL 0.9% IRRIG 1000ML BOTTLE 07138-09

## (undated) DEVICE — GLOVE PROTEXIS BLUE W/NEU-THERA 7.0  2D73EB70

## (undated) DEVICE — STRAP KNEE/BODY 31143004

## (undated) DEVICE — DRSG STERI STRIP 1/4X3" R1541

## (undated) DEVICE — ESU GROUND PAD UNIVERSAL W/O CORD

## (undated) DEVICE — SU VICRYL 4-0 KS 27" UND J662H

## (undated) DEVICE — SU VICRYL 3-0 CTX 36" UND J980H

## (undated) DEVICE — BNDG ABDOMINAL BINDER 10X26-50" 08140145

## (undated) DEVICE — SU VICRYL 0 CT-1 36" J346H

## (undated) DEVICE — PACK C-SECTION LF PL15OTA83B

## (undated) DEVICE — SOL ADH LIQUID BENZOIN SWAB 0.6ML C1544

## (undated) DEVICE — PREP CHLORAPREP 26ML TINTED ORANGE  260815

## (undated) DEVICE — CATH TRAY FOLEY 16FR BARDEX W/DRAIN BAG STATLOCK 300316A

## (undated) DEVICE — GLOVE ESTEEM POWDER FREE SMT 6.5  2D72PT65

## (undated) DEVICE — STOCKING SLEEVE COMPRESSION CALF LG

## (undated) DEVICE — SOL WATER IRRIG 1000ML BOTTLE 07139-09

## (undated) RX ORDER — KETOROLAC TROMETHAMINE 30 MG/ML
INJECTION, SOLUTION INTRAMUSCULAR; INTRAVENOUS
Status: DISPENSED
Start: 2023-10-04

## (undated) RX ORDER — PHENYLEPHRINE HCL IN 0.9% NACL 50MG/250ML
PLASTIC BAG, INJECTION (ML) INTRAVENOUS
Status: DISPENSED
Start: 2023-10-04

## (undated) RX ORDER — ONDANSETRON 2 MG/ML
INJECTION INTRAMUSCULAR; INTRAVENOUS
Status: DISPENSED
Start: 2023-10-04

## (undated) RX ORDER — LIDOCAINE HCL/EPINEPHRINE/PF 2%-1:200K
VIAL (ML) INJECTION
Status: DISPENSED
Start: 2023-10-04

## (undated) RX ORDER — MORPHINE SULFATE 1 MG/ML
INJECTION, SOLUTION EPIDURAL; INTRATHECAL; INTRAVENOUS
Status: DISPENSED
Start: 2023-10-04

## (undated) RX ORDER — FENTANYL CITRATE-0.9 % NACL/PF 10 MCG/ML
PLASTIC BAG, INJECTION (ML) INTRAVENOUS
Status: DISPENSED
Start: 2023-10-04

## (undated) RX ORDER — OXYTOCIN/0.9 % SODIUM CHLORIDE 30/500 ML
PLASTIC BAG, INJECTION (ML) INTRAVENOUS
Status: DISPENSED
Start: 2023-10-04